# Patient Record
Sex: FEMALE | Race: OTHER | HISPANIC OR LATINO | URBAN - METROPOLITAN AREA
[De-identification: names, ages, dates, MRNs, and addresses within clinical notes are randomized per-mention and may not be internally consistent; named-entity substitution may affect disease eponyms.]

---

## 2019-10-21 ENCOUNTER — EMERGENCY (EMERGENCY)
Facility: HOSPITAL | Age: 44
LOS: 1 days | Discharge: HOME | End: 2019-10-21
Attending: EMERGENCY MEDICINE
Payer: MEDICAID

## 2019-10-21 VITALS
SYSTOLIC BLOOD PRESSURE: 136 MMHG | HEIGHT: 66 IN | WEIGHT: 231.49 LBS | DIASTOLIC BLOOD PRESSURE: 79 MMHG | RESPIRATION RATE: 16 BRPM | OXYGEN SATURATION: 99 % | HEART RATE: 78 BPM | TEMPERATURE: 97 F

## 2019-10-21 LAB
ALBUMIN SERPL ELPH-MCNC: 3.8 G/DL — SIGNIFICANT CHANGE UP (ref 3.5–5)
ALP SERPL-CCNC: 64 U/L — SIGNIFICANT CHANGE UP (ref 40–120)
ALT FLD-CCNC: 131 U/L DA — HIGH (ref 10–60)
ANION GAP SERPL CALC-SCNC: 5 MMOL/L — SIGNIFICANT CHANGE UP (ref 5–17)
ANISOCYTOSIS BLD QL: SLIGHT — SIGNIFICANT CHANGE UP
APPEARANCE UR: CLEAR — SIGNIFICANT CHANGE UP
AST SERPL-CCNC: 103 U/L — HIGH (ref 10–40)
BASOPHILS # BLD AUTO: 0.07 K/UL — SIGNIFICANT CHANGE UP (ref 0–0.2)
BASOPHILS NFR BLD AUTO: 1 % — SIGNIFICANT CHANGE UP (ref 0–2)
BILIRUB SERPL-MCNC: 0.3 MG/DL — SIGNIFICANT CHANGE UP (ref 0.2–1.2)
BILIRUB UR-MCNC: NEGATIVE — SIGNIFICANT CHANGE UP
BUN SERPL-MCNC: 7 MG/DL — SIGNIFICANT CHANGE UP (ref 7–18)
CALCIUM SERPL-MCNC: 9.5 MG/DL — SIGNIFICANT CHANGE UP (ref 8.4–10.5)
CHLORIDE SERPL-SCNC: 104 MMOL/L — SIGNIFICANT CHANGE UP (ref 96–108)
CO2 SERPL-SCNC: 29 MMOL/L — SIGNIFICANT CHANGE UP (ref 22–31)
COLOR SPEC: YELLOW — SIGNIFICANT CHANGE UP
CREAT SERPL-MCNC: 0.71 MG/DL — SIGNIFICANT CHANGE UP (ref 0.5–1.3)
DIFF PNL FLD: NEGATIVE — SIGNIFICANT CHANGE UP
EOSINOPHIL # BLD AUTO: 0.44 K/UL — SIGNIFICANT CHANGE UP (ref 0–0.5)
EOSINOPHIL NFR BLD AUTO: 6 % — SIGNIFICANT CHANGE UP (ref 0–6)
GLUCOSE SERPL-MCNC: 100 MG/DL — HIGH (ref 70–99)
GLUCOSE UR QL: NEGATIVE — SIGNIFICANT CHANGE UP
HCT VFR BLD CALC: 43.5 % — SIGNIFICANT CHANGE UP (ref 34.5–45)
HGB BLD-MCNC: 13.5 G/DL — SIGNIFICANT CHANGE UP (ref 11.5–15.5)
HYPOCHROMIA BLD QL: SLIGHT — SIGNIFICANT CHANGE UP
KETONES UR-MCNC: NEGATIVE — SIGNIFICANT CHANGE UP
LEUKOCYTE ESTERASE UR-ACNC: NEGATIVE — SIGNIFICANT CHANGE UP
LG PLATELETS BLD QL AUTO: SLIGHT — SIGNIFICANT CHANGE UP
LIDOCAIN IGE QN: 184 U/L — SIGNIFICANT CHANGE UP (ref 73–393)
LYMPHOCYTES # BLD AUTO: 2.35 K/UL — SIGNIFICANT CHANGE UP (ref 1–3.3)
LYMPHOCYTES # BLD AUTO: 32 % — SIGNIFICANT CHANGE UP (ref 13–44)
MANUAL SMEAR VERIFICATION: SIGNIFICANT CHANGE UP
MCHC RBC-ENTMCNC: 29.3 PG — SIGNIFICANT CHANGE UP (ref 27–34)
MCHC RBC-ENTMCNC: 31 GM/DL — LOW (ref 32–36)
MCV RBC AUTO: 94.4 FL — SIGNIFICANT CHANGE UP (ref 80–100)
MICROCYTES BLD QL: SLIGHT — SIGNIFICANT CHANGE UP
MONOCYTES # BLD AUTO: 0.44 K/UL — SIGNIFICANT CHANGE UP (ref 0–0.9)
MONOCYTES NFR BLD AUTO: 6 % — SIGNIFICANT CHANGE UP (ref 2–14)
NEUTROPHILS # BLD AUTO: 4.04 K/UL — SIGNIFICANT CHANGE UP (ref 1.8–7.4)
NEUTROPHILS NFR BLD AUTO: 55 % — SIGNIFICANT CHANGE UP (ref 43–77)
NITRITE UR-MCNC: NEGATIVE — SIGNIFICANT CHANGE UP
NRBC # BLD: 0 /100 — SIGNIFICANT CHANGE UP (ref 0–0)
OVALOCYTES BLD QL SMEAR: SLIGHT — SIGNIFICANT CHANGE UP
PH UR: 6.5 — SIGNIFICANT CHANGE UP (ref 5–8)
PLAT MORPH BLD: NORMAL — SIGNIFICANT CHANGE UP
PLATELET # BLD AUTO: 229 K/UL — SIGNIFICANT CHANGE UP (ref 150–400)
POIKILOCYTOSIS BLD QL AUTO: SLIGHT — SIGNIFICANT CHANGE UP
POLYCHROMASIA BLD QL SMEAR: SLIGHT — SIGNIFICANT CHANGE UP
POTASSIUM SERPL-MCNC: 3.6 MMOL/L — SIGNIFICANT CHANGE UP (ref 3.5–5.3)
POTASSIUM SERPL-SCNC: 3.6 MMOL/L — SIGNIFICANT CHANGE UP (ref 3.5–5.3)
PROT SERPL-MCNC: 8.1 G/DL — SIGNIFICANT CHANGE UP (ref 6–8.3)
PROT UR-MCNC: NEGATIVE — SIGNIFICANT CHANGE UP
RBC # BLD: 4.61 M/UL — SIGNIFICANT CHANGE UP (ref 3.8–5.2)
RBC # FLD: 16 % — HIGH (ref 10.3–14.5)
RBC BLD AUTO: ABNORMAL
SODIUM SERPL-SCNC: 138 MMOL/L — SIGNIFICANT CHANGE UP (ref 135–145)
SP GR SPEC: 1 — LOW (ref 1.01–1.02)
SPHEROCYTES BLD QL SMEAR: SLIGHT — SIGNIFICANT CHANGE UP
UROBILINOGEN FLD QL: NEGATIVE — SIGNIFICANT CHANGE UP
WBC # BLD: 7.35 K/UL — SIGNIFICANT CHANGE UP (ref 3.8–10.5)
WBC # FLD AUTO: 7.35 K/UL — SIGNIFICANT CHANGE UP (ref 3.8–10.5)

## 2019-10-21 PROCEDURE — 76705 ECHO EXAM OF ABDOMEN: CPT | Mod: 26

## 2019-10-21 PROCEDURE — 99284 EMERGENCY DEPT VISIT MOD MDM: CPT

## 2019-10-21 PROCEDURE — 81003 URINALYSIS AUTO W/O SCOPE: CPT

## 2019-10-21 PROCEDURE — 36415 COLL VENOUS BLD VENIPUNCTURE: CPT

## 2019-10-21 PROCEDURE — 85027 COMPLETE CBC AUTOMATED: CPT

## 2019-10-21 PROCEDURE — 76705 ECHO EXAM OF ABDOMEN: CPT

## 2019-10-21 PROCEDURE — 99284 EMERGENCY DEPT VISIT MOD MDM: CPT | Mod: 25

## 2019-10-21 PROCEDURE — 80053 COMPREHEN METABOLIC PANEL: CPT

## 2019-10-21 PROCEDURE — 73030 X-RAY EXAM OF SHOULDER: CPT | Mod: 26,LT

## 2019-10-21 PROCEDURE — 73030 X-RAY EXAM OF SHOULDER: CPT

## 2019-10-21 PROCEDURE — 83690 ASSAY OF LIPASE: CPT

## 2019-10-21 PROCEDURE — 81025 URINE PREGNANCY TEST: CPT

## 2019-10-21 PROCEDURE — 96374 THER/PROPH/DIAG INJ IV PUSH: CPT

## 2019-10-21 RX ORDER — KETOROLAC TROMETHAMINE 30 MG/ML
30 SYRINGE (ML) INJECTION ONCE
Refills: 0 | Status: DISCONTINUED | OUTPATIENT
Start: 2019-10-21 | End: 2019-10-21

## 2019-10-21 RX ORDER — HYDROXYZINE HCL 10 MG
1 TABLET ORAL
Qty: 10 | Refills: 0
Start: 2019-10-21 | End: 2019-10-30

## 2019-10-21 RX ORDER — FAMOTIDINE 10 MG/ML
1 INJECTION INTRAVENOUS
Qty: 14 | Refills: 0
Start: 2019-10-21 | End: 2019-10-27

## 2019-10-21 RX ORDER — SODIUM CHLORIDE 9 MG/ML
1000 INJECTION INTRAMUSCULAR; INTRAVENOUS; SUBCUTANEOUS ONCE
Refills: 0 | Status: COMPLETED | OUTPATIENT
Start: 2019-10-21 | End: 2019-10-21

## 2019-10-21 RX ADMIN — SODIUM CHLORIDE 1000 MILLILITER(S): 9 INJECTION INTRAMUSCULAR; INTRAVENOUS; SUBCUTANEOUS at 13:30

## 2019-10-21 RX ADMIN — Medication 30 MILLIGRAM(S): at 15:40

## 2019-10-21 RX ADMIN — SODIUM CHLORIDE 1000 MILLILITER(S): 9 INJECTION INTRAMUSCULAR; INTRAVENOUS; SUBCUTANEOUS at 12:16

## 2019-10-21 RX ADMIN — Medication 30 MILLIGRAM(S): at 16:30

## 2019-10-21 NOTE — ED PROVIDER NOTE - PATIENT PORTAL LINK FT
You can access the FollowMyHealth Patient Portal offered by [facility] by registering at the following website: [email or web address]. By joining [employer]’s FollowMyHealth portal, you will also be able to view your health information using other applications (apps) compatible with our system.

## 2019-10-21 NOTE — ED ADULT TRIAGE NOTE - CHIEF COMPLAINT QUOTE
c/o headaches on/off x 2 weeks, red eyes, burping, diarrhea on/off and a rash sometimes. Concerned may be allergy to something. c/o headaches on/off x 2 weeks, red eyes, burping, diarrhea on/off, orange urine and a rash sometimes. Concerned may be allergy to something.

## 2019-10-21 NOTE — ED ADULT NURSE NOTE - CHIEF COMPLAINT QUOTE
c/o headaches on/off x 2 weeks, red eyes, burping, diarrhea on/off, orange urine and a rash sometimes. Concerned may be allergy to something.

## 2019-10-21 NOTE — ED PROVIDER NOTE - OBJECTIVE STATEMENT
43 y/o F patient with no significant PMHx and no significant PSHx presents to the ED with itchiness at night with a rash. Patient also reports having abdominal pain and left shoulder pain. Patient says her rash resolved itself and does convey having a new mattress. Patient says her abdominal pain has been chronic and she has some belching and feels bloated. Patient denies radiating pain to the back. Patient denies vomiting and any other complaints. NKDA.

## 2019-10-21 NOTE — ED ADULT NURSE NOTE - OBJECTIVE STATEMENT
As per patient she has on and off headache x2 weeks , with on and off burping ,red eyes , rashes and left shoulder pain for months As per patient she has on and off headache 3 days , with on and off belching,scratching body, rashes all over body, stomach inflamed ,red eyes , rashesx1 month and left shoulder pain x2 days ,denies injury.

## 2019-10-21 NOTE — ED PROVIDER NOTE - CARE PLAN
Principal Discharge DX:	Left shoulder pain, unspecified chronicity  Secondary Diagnosis:	Other gastritis with bleeding, unspecified chronicity

## 2019-10-21 NOTE — ED PROVIDER NOTE - ATTENDING CONTRIBUTION TO CARE
I, Viktoriya Washington, performed the initial face to face bedside interview with this patient regarding history of present illness, review of symptoms and relevant past medical, social and family history.  I completed an independent physical examination.  I was the initial provider who evaluated this patient. I have signed out the follow up of any pending tests (i.e. labs, radiological studies) to the ACP.  I have communicated the patient’s plan of care and disposition with the ACP.  The history, relevant review of systems, past medical and surgical history, medical decision making, and physical examination was documented by the scribe in my presence and I attest to the accuracy of the documentation.

## 2019-11-17 ENCOUNTER — TRANSCRIPTION ENCOUNTER (OUTPATIENT)
Age: 44
End: 2019-11-17

## 2020-06-13 ENCOUNTER — EMERGENCY (EMERGENCY)
Facility: HOSPITAL | Age: 45
LOS: 1 days | Discharge: HOME | End: 2020-06-13
Attending: EMERGENCY MEDICINE | Admitting: EMERGENCY MEDICINE
Payer: MEDICAID

## 2020-06-13 VITALS
TEMPERATURE: 98 F | HEART RATE: 88 BPM | DIASTOLIC BLOOD PRESSURE: 88 MMHG | RESPIRATION RATE: 17 BRPM | SYSTOLIC BLOOD PRESSURE: 124 MMHG | OXYGEN SATURATION: 100 %

## 2020-06-13 VITALS
SYSTOLIC BLOOD PRESSURE: 138 MMHG | DIASTOLIC BLOOD PRESSURE: 85 MMHG | RESPIRATION RATE: 17 BRPM | HEART RATE: 77 BPM | OXYGEN SATURATION: 100 % | TEMPERATURE: 98 F

## 2020-06-13 LAB
ALBUMIN SERPL ELPH-MCNC: 4 G/DL — SIGNIFICANT CHANGE UP (ref 3.3–5)
ALP SERPL-CCNC: 72 U/L — SIGNIFICANT CHANGE UP (ref 40–120)
ALT FLD-CCNC: 126 U/L — HIGH (ref 4–33)
ANION GAP SERPL CALC-SCNC: 14 MMO/L — SIGNIFICANT CHANGE UP (ref 7–14)
APPEARANCE UR: CLEAR — SIGNIFICANT CHANGE UP
AST SERPL-CCNC: 106 U/L — HIGH (ref 4–32)
BASOPHILS # BLD AUTO: 0.02 K/UL — SIGNIFICANT CHANGE UP (ref 0–0.2)
BASOPHILS NFR BLD AUTO: 0.2 % — SIGNIFICANT CHANGE UP (ref 0–2)
BILIRUB SERPL-MCNC: 0.3 MG/DL — SIGNIFICANT CHANGE UP (ref 0.2–1.2)
BILIRUB UR-MCNC: NEGATIVE — SIGNIFICANT CHANGE UP
BLOOD UR QL VISUAL: NEGATIVE — SIGNIFICANT CHANGE UP
BUN SERPL-MCNC: 7 MG/DL — SIGNIFICANT CHANGE UP (ref 7–23)
CALCIUM SERPL-MCNC: 9.3 MG/DL — SIGNIFICANT CHANGE UP (ref 8.4–10.5)
CHLORIDE SERPL-SCNC: 102 MMOL/L — SIGNIFICANT CHANGE UP (ref 98–107)
CO2 SERPL-SCNC: 24 MMOL/L — SIGNIFICANT CHANGE UP (ref 22–31)
COLOR SPEC: SIGNIFICANT CHANGE UP
CREAT SERPL-MCNC: 0.67 MG/DL — SIGNIFICANT CHANGE UP (ref 0.5–1.3)
EOSINOPHIL # BLD AUTO: 0.32 K/UL — SIGNIFICANT CHANGE UP (ref 0–0.5)
EOSINOPHIL NFR BLD AUTO: 3.8 % — SIGNIFICANT CHANGE UP (ref 0–6)
GLUCOSE SERPL-MCNC: 116 MG/DL — HIGH (ref 70–99)
GLUCOSE UR-MCNC: NEGATIVE — SIGNIFICANT CHANGE UP
HCT VFR BLD CALC: 42 % — SIGNIFICANT CHANGE UP (ref 34.5–45)
HGB BLD-MCNC: 13.9 G/DL — SIGNIFICANT CHANGE UP (ref 11.5–15.5)
HIV COMBO RESULT: SIGNIFICANT CHANGE UP
HIV1+2 AB SPEC QL: SIGNIFICANT CHANGE UP
IMM GRANULOCYTES NFR BLD AUTO: 0.2 % — SIGNIFICANT CHANGE UP (ref 0–1.5)
KETONES UR-MCNC: NEGATIVE — SIGNIFICANT CHANGE UP
LEUKOCYTE ESTERASE UR-ACNC: NEGATIVE — SIGNIFICANT CHANGE UP
LIDOCAIN IGE QN: 20.6 U/L — SIGNIFICANT CHANGE UP (ref 7–60)
LYMPHOCYTES # BLD AUTO: 1.92 K/UL — SIGNIFICANT CHANGE UP (ref 1–3.3)
LYMPHOCYTES # BLD AUTO: 22.5 % — SIGNIFICANT CHANGE UP (ref 13–44)
MCHC RBC-ENTMCNC: 32.4 PG — SIGNIFICANT CHANGE UP (ref 27–34)
MCHC RBC-ENTMCNC: 33.1 % — SIGNIFICANT CHANGE UP (ref 32–36)
MCV RBC AUTO: 97.9 FL — SIGNIFICANT CHANGE UP (ref 80–100)
MONOCYTES # BLD AUTO: 0.6 K/UL — SIGNIFICANT CHANGE UP (ref 0–0.9)
MONOCYTES NFR BLD AUTO: 7 % — SIGNIFICANT CHANGE UP (ref 2–14)
NEUTROPHILS # BLD AUTO: 5.64 K/UL — SIGNIFICANT CHANGE UP (ref 1.8–7.4)
NEUTROPHILS NFR BLD AUTO: 66.3 % — SIGNIFICANT CHANGE UP (ref 43–77)
NITRITE UR-MCNC: NEGATIVE — SIGNIFICANT CHANGE UP
NRBC # FLD: 0 K/UL — SIGNIFICANT CHANGE UP (ref 0–0)
PH UR: 6.5 — SIGNIFICANT CHANGE UP (ref 5–8)
PLATELET # BLD AUTO: 227 K/UL — SIGNIFICANT CHANGE UP (ref 150–400)
PMV BLD: 13 FL — SIGNIFICANT CHANGE UP (ref 7–13)
POTASSIUM SERPL-MCNC: 4.1 MMOL/L — SIGNIFICANT CHANGE UP (ref 3.5–5.3)
POTASSIUM SERPL-SCNC: 4.1 MMOL/L — SIGNIFICANT CHANGE UP (ref 3.5–5.3)
PROT SERPL-MCNC: 7.4 G/DL — SIGNIFICANT CHANGE UP (ref 6–8.3)
PROT UR-MCNC: NEGATIVE — SIGNIFICANT CHANGE UP
RBC # BLD: 4.29 M/UL — SIGNIFICANT CHANGE UP (ref 3.8–5.2)
RBC # FLD: 12.7 % — SIGNIFICANT CHANGE UP (ref 10.3–14.5)
SODIUM SERPL-SCNC: 140 MMOL/L — SIGNIFICANT CHANGE UP (ref 135–145)
SP GR SPEC: 1.01 — SIGNIFICANT CHANGE UP (ref 1–1.04)
UROBILINOGEN FLD QL: NORMAL — SIGNIFICANT CHANGE UP
WBC # BLD: 8.52 K/UL — SIGNIFICANT CHANGE UP (ref 3.8–10.5)
WBC # FLD AUTO: 8.52 K/UL — SIGNIFICANT CHANGE UP (ref 3.8–10.5)

## 2020-06-13 PROCEDURE — 99284 EMERGENCY DEPT VISIT MOD MDM: CPT

## 2020-06-13 PROCEDURE — 76705 ECHO EXAM OF ABDOMEN: CPT | Mod: 26

## 2020-06-13 RX ORDER — ONDANSETRON 8 MG/1
4 TABLET, FILM COATED ORAL ONCE
Refills: 0 | Status: COMPLETED | OUTPATIENT
Start: 2020-06-13 | End: 2020-06-13

## 2020-06-13 RX ORDER — FAMOTIDINE 10 MG/ML
20 INJECTION INTRAVENOUS ONCE
Refills: 0 | Status: COMPLETED | OUTPATIENT
Start: 2020-06-13 | End: 2020-06-13

## 2020-06-13 RX ORDER — SUCRALFATE 1 G
10 TABLET ORAL
Qty: 150 | Refills: 0
Start: 2020-06-13 | End: 2020-06-17

## 2020-06-13 RX ORDER — SUCRALFATE 1 G
1 TABLET ORAL ONCE
Refills: 0 | Status: COMPLETED | OUTPATIENT
Start: 2020-06-13 | End: 2020-06-13

## 2020-06-13 RX ORDER — ACETAMINOPHEN 500 MG
650 TABLET ORAL ONCE
Refills: 0 | Status: COMPLETED | OUTPATIENT
Start: 2020-06-13 | End: 2020-06-13

## 2020-06-13 RX ADMIN — Medication 1 GRAM(S): at 16:09

## 2020-06-13 RX ADMIN — Medication 30 MILLILITER(S): at 12:13

## 2020-06-13 RX ADMIN — FAMOTIDINE 20 MILLIGRAM(S): 10 INJECTION INTRAVENOUS at 12:13

## 2020-06-13 RX ADMIN — ONDANSETRON 4 MILLIGRAM(S): 8 TABLET, FILM COATED ORAL at 16:09

## 2020-06-13 RX ADMIN — Medication 650 MILLIGRAM(S): at 14:51

## 2020-06-13 NOTE — ED PROVIDER NOTE - CLINICAL SUMMARY MEDICAL DECISION MAKING FREE TEXT BOX
Naranjo PGY3: 45F w/ chronic abd pain characterized as constant, burning, with ttp epigastrium and RUQ, will trial GI cocktail, suspect gastritis, will check labs, rule out pregnancy, eval for biliary etiology, nondistended, does not appear obstructed, will reassess after labs/ US.

## 2020-06-13 NOTE — ED PROVIDER NOTE - GASTROINTESTINAL, MLM
Abdomen soft, mod TTP epigastrium and RUQ, no rebound, no guarding, nondistended. Abdomen soft, mod TTP epigastrium and RUQ, no rebound, no guarding, nondistended. No Saleem's

## 2020-06-13 NOTE — ED PROVIDER NOTE - NSFOLLOWUPINSTRUCTIONS_ED_ALL_ED_FT
Usted fue visto hoy por dolor abdominal que mejoró significativamente con maalox y pepcid. Debe continuar tomando 20 mg de pepcid cada 12 horas para disminuir el ácido estomacal, ya recibió irineo dosis esta mañana y debe foreign otra hoy. Puede foreign maalox según sea necesario de acuerdo con la etiqueta del medicamento, generalmente cada 8 horas. Estos medicamentos se pueden foreign sin receta médica. Por favor mantente hidratado.    Regrese a la shane de emergencias si desarrolla fiebre, escalofríos o dolor abdominal intenso y vómitos.    Debe hacer un seguimiento con un gastroenterólogo por sospecha de gastritis.    Tuvo irineo elevación del laboratorio hepático, elvin fue muy leve y reyes ecografía de la vesícula biliar y el hígado fue normal.    You were seen today for abdominal pain that improved significantly with maalox and pepcid. You should continue to take pepcid 20mg every 12 hours to decrease stomach acid, you already had a dose this morning and you should take another one today. You can take maalox as needed according to the medication label, this is usually every 8 hours. These medications can be taken over the counter. Please stay hydrated.     Please return to the emergency room if you develop fevers, chills or severe abdominal pain and vomiting.     You should follow up with a gastroenterologist for suspected gastritis.     You had some liver lab elevation but it was very mild and your sonogram of your gallbladder and liver was normal.    Acute Abdominal Pain    WHAT YOU NEED TO KNOW:    The cause of your abdominal pain may not be found. If a cause is found, treatment will depend on what the cause is.     DISCHARGE INSTRUCTIONS:    Return to the emergency department if:     You vomit blood or cannot stop vomiting.      You have blood in your bowel movement or it looks like tar.       You have bleeding from your rectum.       Your abdomen is larger than usual, more painful, and hard.       You have severe pain in your abdomen.       You stop passing gas and having bowel movements.       You feel weak, dizzy, or faint.    Contact your healthcare provider if:     You have a fever.      You have new signs and symptoms.      Your symptoms do not get better with treatment.       You have questions or concerns about your condition or care.    Medicines may be given to decrease pain, treat an infection, and manage your symptoms. Take your medicine as directed. Call your healthcare provider if you think your medicine is not helping or if you have side effects. Tell him if you are allergic to any medicine. Keep a list of the medicines, vitamins, and herbs you take. Include the amounts, and when and why you take them. Bring the list or the pill bottles to follow-up visits. Carry your medicine list with you in case of an emergency.    Manage your symptoms:     Apply heat on your abdomen for 20 to 30 minutes every 2 hours for as many days as directed. Heat helps decrease pain and muscle spasms.       Manage your stress. Stress may cause abdominal pain. Your healthcare provider may recommend relaxation techniques and deep breathing exercises to help decrease your stress. Your healthcare provider may recommend you talk to someone about your stress or anxiety, such as a counselor or a trusted friend. Get plenty of sleep and exercise regularly.       Limit or do not drink alcohol. Alcohol can make your abdominal pain worse. Ask your healthcare provider if it is safe for you to drink alcohol. Also ask how much is safe for you to drink.       Do not smoke. Nicotine and other chemicals in cigarettes can damage your esophagus and stomach. Ask your healthcare provider for information if you currently smoke and need help to quit. E-cigarettes or smokeless tobacco still contain nicotine. Talk to your healthcare provider before you use these products.     Make changes to the food you eat as directed: Do not eat foods that cause abdominal pain or other symptoms. Eat small meals more often.     Eat more high-fiber foods if you are constipated. High-fiber foods include fruits, vegetables, whole-grain foods, and legumes.       Do not eat foods that cause gas if you have bloating. Examples include broccoli, cabbage, and cauliflower. Do not drink soda or carbonated drinks, because these may also cause gas.       Do not eat foods or drinks that contain sorbitol or fructose if you have diarrhea and bloating. Some examples are fruit juices, candy, jelly, and sugar-free gum.       Do not eat high-fat foods, such as fried foods, cheeseburgers, hot dogs, and desserts.      Limit or do not drink caffeine. Caffeine may make symptoms, such as heart burn or nausea, worse.       Drink plenty of liquids to prevent dehydration from diarrhea or vomiting. Ask your healthcare provider how much liquid to drink each day and which liquids are best for you.     Follow up with your healthcare provider as directed: Write down your questions so you remember to ask them during your visits. Usted fue visto hoy por dolor abdominal que mejoró significativamente con maalox y pepcid. Debe continuar tomando 20 mg de pepcid cada 12 horas para disminuir el ácido estomacal, ya recibió irineo dosis esta mañana y debe foreign otra hoy. Puede foreign maalox según sea necesario de acuerdo con la etiqueta del medicamento, generalmente cada 8 horas. Estos medicamentos se pueden foreign sin receta médica. Por favor mantente hidratado.      Enviamos carafate a reyes farmacia. Puede foreign 1 g cada 8 horas, 30 minutos a 1 hora antes de las comidas para ayudar con el dolor de gastritis    Regrese a la shane de emergencias si desarrolla fiebre, escalofríos o dolor abdominal intenso y vómitos.    Debe hacer un seguimiento con un gastroenterólogo por sospecha de gastritis.    Tuvo irineo elevación del laboratorio hepático, elvin fue muy leve y reyes ecografía de la vesícula biliar y el hígado fue normal.    You were seen today for abdominal pain that improved significantly with maalox and pepcid. You should continue to take pepcid 20mg every 12 hours to decrease stomach acid, you already had a dose this morning and you should take another one today. You can take maalox as needed according to the medication label, this is usually every 8 hours. These medications can be taken over the counter. Please stay hydrated.     Please return to the emergency room if you develop fevers, chills or severe abdominal pain and vomiting.     You should follow up with a gastroenterologist for suspected gastritis.     You had some liver lab elevation but it was very mild and your sonogram of your gallbladder and liver was normal.    Acute Abdominal Pain    WHAT YOU NEED TO KNOW:    The cause of your abdominal pain may not be found. If a cause is found, treatment will depend on what the cause is.     DISCHARGE INSTRUCTIONS:    Return to the emergency department if:     You vomit blood or cannot stop vomiting.      You have blood in your bowel movement or it looks like tar.       You have bleeding from your rectum.       Your abdomen is larger than usual, more painful, and hard.       You have severe pain in your abdomen.       You stop passing gas and having bowel movements.       You feel weak, dizzy, or faint.    Contact your healthcare provider if:     You have a fever.      You have new signs and symptoms.      Your symptoms do not get better with treatment.       You have questions or concerns about your condition or care.    Medicines may be given to decrease pain, treat an infection, and manage your symptoms. Take your medicine as directed. Call your healthcare provider if you think your medicine is not helping or if you have side effects. Tell him if you are allergic to any medicine. Keep a list of the medicines, vitamins, and herbs you take. Include the amounts, and when and why you take them. Bring the list or the pill bottles to follow-up visits. Carry your medicine list with you in case of an emergency.    Manage your symptoms:     Apply heat on your abdomen for 20 to 30 minutes every 2 hours for as many days as directed. Heat helps decrease pain and muscle spasms.       Manage your stress. Stress may cause abdominal pain. Your healthcare provider may recommend relaxation techniques and deep breathing exercises to help decrease your stress. Your healthcare provider may recommend you talk to someone about your stress or anxiety, such as a counselor or a trusted friend. Get plenty of sleep and exercise regularly.       Limit or do not drink alcohol. Alcohol can make your abdominal pain worse. Ask your healthcare provider if it is safe for you to drink alcohol. Also ask how much is safe for you to drink.       Do not smoke. Nicotine and other chemicals in cigarettes can damage your esophagus and stomach. Ask your healthcare provider for information if you currently smoke and need help to quit. E-cigarettes or smokeless tobacco still contain nicotine. Talk to your healthcare provider before you use these products.     Make changes to the food you eat as directed: Do not eat foods that cause abdominal pain or other symptoms. Eat small meals more often.     Eat more high-fiber foods if you are constipated. High-fiber foods include fruits, vegetables, whole-grain foods, and legumes.       Do not eat foods that cause gas if you have bloating. Examples include broccoli, cabbage, and cauliflower. Do not drink soda or carbonated drinks, because these may also cause gas.       Do not eat foods or drinks that contain sorbitol or fructose if you have diarrhea and bloating. Some examples are fruit juices, candy, jelly, and sugar-free gum.       Do not eat high-fat foods, such as fried foods, cheeseburgers, hot dogs, and desserts.      Limit or do not drink caffeine. Caffeine may make symptoms, such as heart burn or nausea, worse.       Drink plenty of liquids to prevent dehydration from diarrhea or vomiting. Ask your healthcare provider how much liquid to drink each day and which liquids are best for you.     Follow up with your healthcare provider as directed: Write down your questions so you remember to ask them during your visits.

## 2020-06-13 NOTE — ED PROVIDER NOTE - PATIENT PORTAL LINK FT
You can access the FollowMyHealth Patient Portal offered by [facility] by registering at the following website: [email or web address]. By joining [employer]’s FollowMyHealth portal, you will also be able to view your health information using other applications (apps) compatible with our system. You can access the FollowMyHealth Patient Portal offered by [facility] by registering at the following website: [email or web address]. By joining [employer]’s FollowMyHealth portal, you will also be able to view your health information using other applications (apps) compatible with our system.

## 2020-06-13 NOTE — ED PROVIDER NOTE - PROGRESS NOTE DETAILS
Naranjo PGY3: pt feels improved, states discomfort is 4/10 but feels comfortable, given food to po challenge, results and follow up information explained, as well as return precautions, all questions answered. - Guamanian translation provided by Samia Stover RN Naranjo PGY3: pt had recurrent discomfort after eating, then given carafate, notes significant improvement, given GI referral, and sent carafate to pharmacy, pt stable for dc

## 2020-06-13 NOTE — ED ADULT NURSE NOTE - OBJECTIVE STATEMENT
Pt a&ox3, calm/cooperative, c/o of upper abdominal pain with n/v/d  since March. Pt denies chest pain, sob. respirations even/unlabored, nad noted. 20g iv to right ac, labs drawn and sent. will continue to monitor

## 2020-06-16 ENCOUNTER — APPOINTMENT (OUTPATIENT)
Dept: GASTROENTEROLOGY | Facility: CLINIC | Age: 45
End: 2020-06-16
Payer: MEDICAID

## 2020-06-16 VITALS
BODY MASS INDEX: 40.34 KG/M2 | SYSTOLIC BLOOD PRESSURE: 109 MMHG | WEIGHT: 251 LBS | DIASTOLIC BLOOD PRESSURE: 75 MMHG | HEIGHT: 66 IN | OXYGEN SATURATION: 97 % | TEMPERATURE: 98.1 F | HEART RATE: 73 BPM

## 2020-06-16 DIAGNOSIS — Z83.79 FAMILY HISTORY OF OTHER DISEASES OF THE DIGESTIVE SYSTEM: ICD-10-CM

## 2020-06-16 DIAGNOSIS — Z83.3 FAMILY HISTORY OF DIABETES MELLITUS: ICD-10-CM

## 2020-06-16 DIAGNOSIS — Z78.9 OTHER SPECIFIED HEALTH STATUS: ICD-10-CM

## 2020-06-16 PROCEDURE — 99204 OFFICE O/P NEW MOD 45 MIN: CPT

## 2020-06-16 RX ORDER — CEPHALEXIN 500 MG
1 CAPSULE ORAL
Qty: 10 | Refills: 0
Start: 2020-06-16 | End: 2020-06-20

## 2020-06-16 NOTE — ASSESSMENT
[FreeTextEntry1] : 1. GERD\par \par Avoid spicy oily greasy foods\par \par Low acid diet \par \par PPI\par \par Wt control \par \par Exercise plan\par \par \par 2. BLOAT\par \par LOW FODMAP\par \par Fiber supp daily \par \par Increase fluids\par \par \par 3. GAS\par \par Avoid leafy vegetables\par \par GASEX PRN\par \par \par \par

## 2020-06-16 NOTE — PHYSICAL EXAM
[General Appearance - Alert] : alert [General Appearance - In No Acute Distress] : in no acute distress [Sclera] : the sclera and conjunctiva were normal [PERRL With Normal Accommodation] : pupils were equal in size, round, and reactive to light [Extraocular Movements] : extraocular movements were intact [Outer Ear] : the ears and nose were normal in appearance [Oropharynx] : the oropharynx was normal [Neck Appearance] : the appearance of the neck was normal [Neck Cervical Mass (___cm)] : no neck mass was observed [Jugular Venous Distention Increased] : there was no jugular-venous distention [Thyroid Diffuse Enlargement] : the thyroid was not enlarged [Thyroid Nodule] : there were no palpable thyroid nodules [Auscultation Breath Sounds / Voice Sounds] : lungs were clear to auscultation bilaterally [Heart Rate And Rhythm] : heart rate was normal and rhythm regular [Heart Sounds] : normal S1 and S2 [Heart Sounds Gallop] : no gallops [Murmurs] : no murmurs [Heart Sounds Pericardial Friction Rub] : no pericardial rub [Rounded] : rounded [Epigastric] : in the epigastric area [RLQ] : not in the RLQ [RUQ] : not in the RUQ [LLQ] : not in the LLQ [LUQ] : not in the LUQ [Cervical Lymph Nodes Enlarged Posterior Bilaterally] : posterior cervical [Cervical Lymph Nodes Enlarged Anterior Bilaterally] : anterior cervical [Supraclavicular Lymph Nodes Enlarged Bilaterally] : supraclavicular [Axillary Lymph Nodes Enlarged Bilaterally] : axillary [Inguinal Lymph Nodes Enlarged Bilaterally] : inguinal [Femoral Lymph Nodes Enlarged Bilaterally] : femoral [No CVA Tenderness] : no ~M costovertebral angle tenderness [No Spinal Tenderness] : no spinal tenderness [Nail Clubbing] : no clubbing  or cyanosis of the fingernails [Abnormal Walk] : normal gait [Motor Tone] : muscle strength and tone were normal [Musculoskeletal - Swelling] : no joint swelling seen [Skin Color & Pigmentation] : normal skin color and pigmentation [Skin Turgor] : normal skin turgor [] : no rash

## 2020-06-16 NOTE — ED POST DISCHARGE NOTE - RESULT SUMMARY
UXC: E. Coli  50,000-99,000 CFU/ml No antibiotic listed in ED provider note or prescription writer at time of discharge. Patient contacted s/w patient and . Patient having odor to urine. Discussed with patient will ERX Keflex 500mg BID X % days. Patient denies any pregnancy NKDA. Discussed with patient and  patient needs to follow up with MD for repeat UA/UCX. Return precautions given to patient.

## 2020-06-16 NOTE — HISTORY OF PRESENT ILLNESS
[de-identified] : Pt with dyspesia \par \par Was in ER 6/13\par \par Labs / Sono OK \par \par C/W NAFLD\par \par Sono OK \par \par + Bloat \par \par + ETOH\par \par Recent Smoker

## 2020-07-12 ENCOUNTER — OUTPATIENT (OUTPATIENT)
Dept: OUTPATIENT SERVICES | Facility: HOSPITAL | Age: 45
LOS: 1 days | Discharge: HOME | End: 2020-07-12

## 2020-07-12 DIAGNOSIS — Z01.818 ENCOUNTER FOR OTHER PREPROCEDURAL EXAMINATION: ICD-10-CM

## 2020-07-12 LAB — SARS-COV-2 RNA SPEC QL NAA+PROBE: SIGNIFICANT CHANGE UP

## 2020-07-14 ENCOUNTER — RESULT REVIEW (OUTPATIENT)
Age: 45
End: 2020-07-14

## 2020-07-14 ENCOUNTER — APPOINTMENT (OUTPATIENT)
Dept: GASTROENTEROLOGY | Facility: HOSPITAL | Age: 45
End: 2020-07-14
Payer: MEDICAID

## 2020-07-14 ENCOUNTER — OUTPATIENT (OUTPATIENT)
Dept: OUTPATIENT SERVICES | Facility: HOSPITAL | Age: 45
LOS: 1 days | Discharge: HOME | End: 2020-07-14
Payer: MEDICAID

## 2020-07-14 VITALS
SYSTOLIC BLOOD PRESSURE: 123 MMHG | HEART RATE: 79 BPM | DIASTOLIC BLOOD PRESSURE: 73 MMHG | RESPIRATION RATE: 16 BRPM | OXYGEN SATURATION: 98 %

## 2020-07-14 VITALS
WEIGHT: 253.09 LBS | TEMPERATURE: 99 F | DIASTOLIC BLOOD PRESSURE: 76 MMHG | HEART RATE: 94 BPM | HEIGHT: 66 IN | SYSTOLIC BLOOD PRESSURE: 151 MMHG | RESPIRATION RATE: 18 BRPM | OXYGEN SATURATION: 99 %

## 2020-07-14 PROCEDURE — 88312 SPECIAL STAINS GROUP 1: CPT | Mod: 26

## 2020-07-14 PROCEDURE — 88305 TISSUE EXAM BY PATHOLOGIST: CPT | Mod: 26

## 2020-07-14 PROCEDURE — 43239 EGD BIOPSY SINGLE/MULTIPLE: CPT

## 2020-07-14 RX ORDER — ONDANSETRON 8 MG/1
4 TABLET, FILM COATED ORAL ONCE
Refills: 0 | Status: DISCONTINUED | OUTPATIENT
Start: 2020-07-14 | End: 2020-07-14

## 2020-07-14 RX ORDER — SODIUM CHLORIDE 9 MG/ML
1000 INJECTION, SOLUTION INTRAVENOUS
Refills: 0 | Status: DISCONTINUED | OUTPATIENT
Start: 2020-07-14 | End: 2020-07-14

## 2020-07-14 RX ADMIN — SODIUM CHLORIDE 100 MILLILITER(S): 9 INJECTION, SOLUTION INTRAVENOUS at 11:10

## 2020-07-14 NOTE — ASU DISCHARGE PLAN (ADULT/PEDIATRIC) - CALL YOUR DOCTOR IF YOU HAVE ANY OF THE FOLLOWING:
Nausea and vomiting that does not stop/Increased irritability or sluggishness/Bleeding that does not stop/Wound/Surgical Site with redness, or foul smelling discharge or pus

## 2020-07-15 LAB — SURGICAL PATHOLOGY STUDY: SIGNIFICANT CHANGE UP

## 2020-07-20 DIAGNOSIS — K29.50 UNSPECIFIED CHRONIC GASTRITIS WITHOUT BLEEDING: ICD-10-CM

## 2020-07-20 DIAGNOSIS — B96.81 HELICOBACTER PYLORI [H. PYLORI] AS THE CAUSE OF DISEASES CLASSIFIED ELSEWHERE: ICD-10-CM

## 2020-07-20 DIAGNOSIS — K21.9 GASTRO-ESOPHAGEAL REFLUX DISEASE WITHOUT ESOPHAGITIS: ICD-10-CM

## 2020-07-20 DIAGNOSIS — Z87.891 PERSONAL HISTORY OF NICOTINE DEPENDENCE: ICD-10-CM

## 2020-07-20 DIAGNOSIS — E66.01 MORBID (SEVERE) OBESITY DUE TO EXCESS CALORIES: ICD-10-CM

## 2020-08-18 ENCOUNTER — APPOINTMENT (OUTPATIENT)
Dept: GASTROENTEROLOGY | Facility: CLINIC | Age: 45
End: 2020-08-18
Payer: MEDICAID

## 2020-08-18 VITALS
HEIGHT: 66 IN | SYSTOLIC BLOOD PRESSURE: 118 MMHG | HEART RATE: 110 BPM | DIASTOLIC BLOOD PRESSURE: 81 MMHG | OXYGEN SATURATION: 96 % | WEIGHT: 250 LBS | BODY MASS INDEX: 40.18 KG/M2

## 2020-08-18 PROCEDURE — 99214 OFFICE O/P EST MOD 30 MIN: CPT

## 2020-08-18 NOTE — PHYSICAL EXAM
[General Appearance - Alert] : alert [General Appearance - In No Acute Distress] : in no acute distress [Sclera] : the sclera and conjunctiva were normal [PERRL With Normal Accommodation] : pupils were equal in size, round, and reactive to light [Extraocular Movements] : extraocular movements were intact [Outer Ear] : the ears and nose were normal in appearance [Oropharynx] : the oropharynx was normal [Neck Appearance] : the appearance of the neck was normal [Neck Cervical Mass (___cm)] : no neck mass was observed [Jugular Venous Distention Increased] : there was no jugular-venous distention [Thyroid Diffuse Enlargement] : the thyroid was not enlarged [Thyroid Nodule] : there were no palpable thyroid nodules [Auscultation Breath Sounds / Voice Sounds] : lungs were clear to auscultation bilaterally [Heart Rate And Rhythm] : heart rate was normal and rhythm regular [Heart Sounds] : normal S1 and S2 [Heart Sounds Gallop] : no gallops [Murmurs] : no murmurs [Heart Sounds Pericardial Friction Rub] : no pericardial rub [Normal] : normal [Soft, Nontender] : the abdomen was soft and nontender [Epigastric] : in the epigastric area [No Mass] : no masses were palpated [No HSM] : no hepatosplenomegaly noted [Cervical Lymph Nodes Enlarged Posterior Bilaterally] : posterior cervical [Cervical Lymph Nodes Enlarged Anterior Bilaterally] : anterior cervical [Supraclavicular Lymph Nodes Enlarged Bilaterally] : supraclavicular [Axillary Lymph Nodes Enlarged Bilaterally] : axillary [Femoral Lymph Nodes Enlarged Bilaterally] : femoral [Inguinal Lymph Nodes Enlarged Bilaterally] : inguinal [No Spinal Tenderness] : no spinal tenderness [No CVA Tenderness] : no ~M costovertebral angle tenderness [Abnormal Walk] : normal gait [Nail Clubbing] : no clubbing  or cyanosis of the fingernails [Musculoskeletal - Swelling] : no joint swelling seen [Motor Tone] : muscle strength and tone were normal [Skin Color & Pigmentation] : normal skin color and pigmentation [Skin Turgor] : normal skin turgor [] : no rash

## 2020-09-09 ENCOUNTER — APPOINTMENT (OUTPATIENT)
Dept: GASTROENTEROLOGY | Facility: CLINIC | Age: 45
End: 2020-09-09
Payer: MEDICAID

## 2020-09-09 VITALS
BODY MASS INDEX: 40.18 KG/M2 | HEIGHT: 66 IN | DIASTOLIC BLOOD PRESSURE: 82 MMHG | WEIGHT: 250 LBS | HEART RATE: 90 BPM | TEMPERATURE: 93.3 F | SYSTOLIC BLOOD PRESSURE: 132 MMHG | RESPIRATION RATE: 17 BRPM | OXYGEN SATURATION: 98 %

## 2020-09-09 DIAGNOSIS — R14.3 FLATULENCE: ICD-10-CM

## 2020-09-09 DIAGNOSIS — R14.1 GAS PAIN: ICD-10-CM

## 2020-09-09 PROCEDURE — 99214 OFFICE O/P EST MOD 30 MIN: CPT

## 2020-09-09 NOTE — PHYSICAL EXAM
[General Appearance - In No Acute Distress] : in no acute distress [General Appearance - Alert] : alert [Sclera] : the sclera and conjunctiva were normal [PERRL With Normal Accommodation] : pupils were equal in size, round, and reactive to light [Extraocular Movements] : extraocular movements were intact [Oropharynx] : the oropharynx was normal [Outer Ear] : the ears and nose were normal in appearance [Neck Appearance] : the appearance of the neck was normal [Neck Cervical Mass (___cm)] : no neck mass was observed [Jugular Venous Distention Increased] : there was no jugular-venous distention [Thyroid Diffuse Enlargement] : the thyroid was not enlarged [Thyroid Nodule] : there were no palpable thyroid nodules [Auscultation Breath Sounds / Voice Sounds] : lungs were clear to auscultation bilaterally [Heart Rate And Rhythm] : heart rate was normal and rhythm regular [Heart Sounds] : normal S1 and S2 [Heart Sounds Gallop] : no gallops [Murmurs] : no murmurs [Heart Sounds Pericardial Friction Rub] : no pericardial rub [Bowel Sounds] : normal bowel sounds [Abdomen Soft] : soft [Abdomen Mass (___ Cm)] : no abdominal mass palpated [Abdomen Tenderness] : non-tender [Supraclavicular Lymph Nodes Enlarged Bilaterally] : supraclavicular [Cervical Lymph Nodes Enlarged Posterior Bilaterally] : posterior cervical [Cervical Lymph Nodes Enlarged Anterior Bilaterally] : anterior cervical [Femoral Lymph Nodes Enlarged Bilaterally] : femoral [Axillary Lymph Nodes Enlarged Bilaterally] : axillary [Inguinal Lymph Nodes Enlarged Bilaterally] : inguinal [No CVA Tenderness] : no ~M costovertebral angle tenderness [No Spinal Tenderness] : no spinal tenderness [Nail Clubbing] : no clubbing  or cyanosis of the fingernails [Abnormal Walk] : normal gait [Musculoskeletal - Swelling] : no joint swelling seen [Motor Tone] : muscle strength and tone were normal [Skin Color & Pigmentation] : normal skin color and pigmentation [Skin Turgor] : normal skin turgor [] : no rash [Normal] : normal [Soft, Nontender] : the abdomen was soft and nontender [Epigastric] : in the epigastric area [No Mass] : no masses were palpated [No HSM] : no hepatosplenomegaly noted

## 2020-10-01 ENCOUNTER — INPATIENT (INPATIENT)
Facility: HOSPITAL | Age: 45
LOS: 2 days | Discharge: HOME | DRG: 880 | End: 2020-10-04
Attending: INTERNAL MEDICINE | Admitting: INTERNAL MEDICINE
Payer: MEDICAID

## 2020-10-01 VITALS
HEIGHT: 66 IN | OXYGEN SATURATION: 97 % | HEART RATE: 130 BPM | RESPIRATION RATE: 18 BRPM | TEMPERATURE: 100 F | SYSTOLIC BLOOD PRESSURE: 145 MMHG | DIASTOLIC BLOOD PRESSURE: 82 MMHG | WEIGHT: 255.07 LBS

## 2020-10-01 DIAGNOSIS — Z90.49 ACQUIRED ABSENCE OF OTHER SPECIFIED PARTS OF DIGESTIVE TRACT: Chronic | ICD-10-CM

## 2020-10-01 DIAGNOSIS — Z29.9 ENCOUNTER FOR PROPHYLACTIC MEASURES, UNSPECIFIED: ICD-10-CM

## 2020-10-01 DIAGNOSIS — R21 RASH AND OTHER NONSPECIFIC SKIN ERUPTION: ICD-10-CM

## 2020-10-01 DIAGNOSIS — R74.0 NONSPECIFIC ELEVATION OF LEVELS OF TRANSAMINASE AND LACTIC ACID DEHYDROGENASE [LDH]: ICD-10-CM

## 2020-10-01 DIAGNOSIS — R00.0 TACHYCARDIA, UNSPECIFIED: ICD-10-CM

## 2020-10-01 DIAGNOSIS — R91.1 SOLITARY PULMONARY NODULE: ICD-10-CM

## 2020-10-01 DIAGNOSIS — G93.0 CEREBRAL CYSTS: ICD-10-CM

## 2020-10-01 DIAGNOSIS — K29.70 GASTRITIS, UNSPECIFIED, WITHOUT BLEEDING: ICD-10-CM

## 2020-10-01 DIAGNOSIS — F10.10 ALCOHOL ABUSE, UNCOMPLICATED: ICD-10-CM

## 2020-10-01 DIAGNOSIS — F41.9 ANXIETY DISORDER, UNSPECIFIED: ICD-10-CM

## 2020-10-01 LAB
ALBUMIN SERPL ELPH-MCNC: 3.7 G/DL — SIGNIFICANT CHANGE UP (ref 3.5–5)
ALP SERPL-CCNC: 82 U/L — SIGNIFICANT CHANGE UP (ref 40–120)
ALT FLD-CCNC: 87 U/L DA — HIGH (ref 10–60)
ANION GAP SERPL CALC-SCNC: 8 MMOL/L — SIGNIFICANT CHANGE UP (ref 5–17)
APTT BLD: 34.7 SEC — SIGNIFICANT CHANGE UP (ref 27.5–35.5)
AST SERPL-CCNC: 71 U/L — HIGH (ref 10–40)
BASOPHILS # BLD AUTO: 0.05 K/UL — SIGNIFICANT CHANGE UP (ref 0–0.2)
BASOPHILS NFR BLD AUTO: 0.5 % — SIGNIFICANT CHANGE UP (ref 0–2)
BILIRUB SERPL-MCNC: 0.2 MG/DL — SIGNIFICANT CHANGE UP (ref 0.2–1.2)
BUN SERPL-MCNC: 19 MG/DL — HIGH (ref 7–18)
CALCIUM SERPL-MCNC: 9.2 MG/DL — SIGNIFICANT CHANGE UP (ref 8.4–10.5)
CHLORIDE SERPL-SCNC: 105 MMOL/L — SIGNIFICANT CHANGE UP (ref 96–108)
CO2 SERPL-SCNC: 26 MMOL/L — SIGNIFICANT CHANGE UP (ref 22–31)
CREAT SERPL-MCNC: 0.74 MG/DL — SIGNIFICANT CHANGE UP (ref 0.5–1.3)
EOSINOPHIL # BLD AUTO: 0.06 K/UL — SIGNIFICANT CHANGE UP (ref 0–0.5)
EOSINOPHIL NFR BLD AUTO: 0.6 % — SIGNIFICANT CHANGE UP (ref 0–6)
ERYTHROCYTE [SEDIMENTATION RATE] IN BLOOD: 19 MM/HR — HIGH (ref 0–15)
GLUCOSE SERPL-MCNC: 119 MG/DL — HIGH (ref 70–99)
HCG SERPL-ACNC: 1 MIU/ML — SIGNIFICANT CHANGE UP
HCT VFR BLD CALC: 41.7 % — SIGNIFICANT CHANGE UP (ref 34.5–45)
HGB BLD-MCNC: 13.7 G/DL — SIGNIFICANT CHANGE UP (ref 11.5–15.5)
IMM GRANULOCYTES NFR BLD AUTO: 0.3 % — SIGNIFICANT CHANGE UP (ref 0–1.5)
INR BLD: 0.94 RATIO — SIGNIFICANT CHANGE UP (ref 0.88–1.16)
LIDOCAIN IGE QN: 119 U/L — SIGNIFICANT CHANGE UP (ref 73–393)
LYMPHOCYTES # BLD AUTO: 2.24 K/UL — SIGNIFICANT CHANGE UP (ref 1–3.3)
LYMPHOCYTES # BLD AUTO: 23.7 % — SIGNIFICANT CHANGE UP (ref 13–44)
MCHC RBC-ENTMCNC: 31.3 PG — SIGNIFICANT CHANGE UP (ref 27–34)
MCHC RBC-ENTMCNC: 32.9 GM/DL — SIGNIFICANT CHANGE UP (ref 32–36)
MCV RBC AUTO: 95.2 FL — SIGNIFICANT CHANGE UP (ref 80–100)
MONOCYTES # BLD AUTO: 0.52 K/UL — SIGNIFICANT CHANGE UP (ref 0–0.9)
MONOCYTES NFR BLD AUTO: 5.5 % — SIGNIFICANT CHANGE UP (ref 2–14)
NEUTROPHILS # BLD AUTO: 6.57 K/UL — SIGNIFICANT CHANGE UP (ref 1.8–7.4)
NEUTROPHILS NFR BLD AUTO: 69.4 % — SIGNIFICANT CHANGE UP (ref 43–77)
NRBC # BLD: 0 /100 WBCS — SIGNIFICANT CHANGE UP (ref 0–0)
PCP SPEC-MCNC: SIGNIFICANT CHANGE UP
PLATELET # BLD AUTO: 247 K/UL — SIGNIFICANT CHANGE UP (ref 150–400)
POTASSIUM SERPL-MCNC: 4.2 MMOL/L — SIGNIFICANT CHANGE UP (ref 3.5–5.3)
POTASSIUM SERPL-SCNC: 4.2 MMOL/L — SIGNIFICANT CHANGE UP (ref 3.5–5.3)
PROT SERPL-MCNC: 8.2 G/DL — SIGNIFICANT CHANGE UP (ref 6–8.3)
PROTHROM AB SERPL-ACNC: 11 SEC — SIGNIFICANT CHANGE UP (ref 10.6–13.6)
RBC # BLD: 4.38 M/UL — SIGNIFICANT CHANGE UP (ref 3.8–5.2)
RBC # FLD: 13.6 % — SIGNIFICANT CHANGE UP (ref 10.3–14.5)
SARS-COV-2 RNA SPEC QL NAA+PROBE: SIGNIFICANT CHANGE UP
SODIUM SERPL-SCNC: 139 MMOL/L — SIGNIFICANT CHANGE UP (ref 135–145)
WBC # BLD: 9.47 K/UL — SIGNIFICANT CHANGE UP (ref 3.8–10.5)
WBC # FLD AUTO: 9.47 K/UL — SIGNIFICANT CHANGE UP (ref 3.8–10.5)

## 2020-10-01 PROCEDURE — 71275 CT ANGIOGRAPHY CHEST: CPT | Mod: 26

## 2020-10-01 PROCEDURE — 99285 EMERGENCY DEPT VISIT HI MDM: CPT

## 2020-10-01 RX ORDER — SODIUM CHLORIDE 9 MG/ML
1000 INJECTION INTRAMUSCULAR; INTRAVENOUS; SUBCUTANEOUS ONCE
Refills: 0 | Status: COMPLETED | OUTPATIENT
Start: 2020-10-01 | End: 2020-10-01

## 2020-10-01 RX ORDER — SODIUM CHLORIDE 9 MG/ML
3 INJECTION INTRAMUSCULAR; INTRAVENOUS; SUBCUTANEOUS EVERY 8 HOURS
Refills: 0 | Status: DISCONTINUED | OUTPATIENT
Start: 2020-10-01 | End: 2020-10-01

## 2020-10-01 RX ORDER — ALPRAZOLAM 0.25 MG
0.5 TABLET ORAL ONCE
Refills: 0 | Status: DISCONTINUED | OUTPATIENT
Start: 2020-10-01 | End: 2020-10-01

## 2020-10-01 RX ORDER — PANTOPRAZOLE SODIUM 20 MG/1
40 TABLET, DELAYED RELEASE ORAL
Refills: 0 | Status: DISCONTINUED | OUTPATIENT
Start: 2020-10-01 | End: 2020-10-04

## 2020-10-01 RX ORDER — DIPHENHYDRAMINE HCL 50 MG
25 CAPSULE ORAL ONCE
Refills: 0 | Status: COMPLETED | OUTPATIENT
Start: 2020-10-01 | End: 2020-10-01

## 2020-10-01 RX ORDER — ONDANSETRON 8 MG/1
4 TABLET, FILM COATED ORAL ONCE
Refills: 0 | Status: COMPLETED | OUTPATIENT
Start: 2020-10-01 | End: 2020-10-01

## 2020-10-01 RX ORDER — ACETAMINOPHEN 500 MG
650 TABLET ORAL ONCE
Refills: 0 | Status: COMPLETED | OUTPATIENT
Start: 2020-10-01 | End: 2020-10-01

## 2020-10-01 RX ORDER — DIPHENHYDRAMINE HCL 50 MG
25 CAPSULE ORAL EVERY 6 HOURS
Refills: 0 | Status: DISCONTINUED | OUTPATIENT
Start: 2020-10-01 | End: 2020-10-04

## 2020-10-01 RX ORDER — ENOXAPARIN SODIUM 100 MG/ML
40 INJECTION SUBCUTANEOUS DAILY
Refills: 0 | Status: DISCONTINUED | OUTPATIENT
Start: 2020-10-01 | End: 2020-10-04

## 2020-10-01 RX ORDER — METOCLOPRAMIDE HCL 10 MG
10 TABLET ORAL ONCE
Refills: 0 | Status: COMPLETED | OUTPATIENT
Start: 2020-10-01 | End: 2020-10-01

## 2020-10-01 RX ORDER — MULTIVIT-MIN/FERROUS GLUCONATE 9 MG/15 ML
1 LIQUID (ML) ORAL DAILY
Refills: 0 | Status: DISCONTINUED | OUTPATIENT
Start: 2020-10-01 | End: 2020-10-04

## 2020-10-01 RX ADMIN — Medication 104 MILLIGRAM(S): at 08:46

## 2020-10-01 RX ADMIN — SODIUM CHLORIDE 1000 MILLILITER(S): 9 INJECTION INTRAMUSCULAR; INTRAVENOUS; SUBCUTANEOUS at 09:40

## 2020-10-01 RX ADMIN — Medication 10 MILLIGRAM(S): at 09:40

## 2020-10-01 RX ADMIN — Medication 25 MILLIGRAM(S): at 07:08

## 2020-10-01 RX ADMIN — SODIUM CHLORIDE 3 MILLILITER(S): 9 INJECTION INTRAMUSCULAR; INTRAVENOUS; SUBCUTANEOUS at 14:33

## 2020-10-01 RX ADMIN — Medication 1 MILLIGRAM(S): at 12:19

## 2020-10-01 RX ADMIN — Medication 125 MILLIGRAM(S): at 08:46

## 2020-10-01 RX ADMIN — ONDANSETRON 4 MILLIGRAM(S): 8 TABLET, FILM COATED ORAL at 07:05

## 2020-10-01 RX ADMIN — Medication 0.5 MILLIGRAM(S): at 08:46

## 2020-10-01 RX ADMIN — SODIUM CHLORIDE 1000 MILLILITER(S): 9 INJECTION INTRAMUSCULAR; INTRAVENOUS; SUBCUTANEOUS at 07:05

## 2020-10-01 RX ADMIN — Medication 25 MILLIGRAM(S): at 08:46

## 2020-10-01 NOTE — H&P ADULT - PROBLEM SELECTOR PLAN 7
IMPROVE VTE score: 0  Will manage with: Lovenox S/C     [ ] Previous VTE                                    3  [ ] Thrombophilia                                  2  [] Lower limb paralysis                        2  (unable to hold up >15 seconds)    [ ] Current Cancer (within 6 months)        2   [] Immobilization > 24 hrs                    1  [ ] ICU/CCU stay > 24 hrs                      1  [] Age > 60                                         1 APpears to be improving from previous labwork.  Suspect fatty liver in the setting of obesity.  - Obtain hepatitis panel  - US RUQ as per primary team

## 2020-10-01 NOTE — H&P ADULT - NSHPPHYSICALEXAM_GEN_ALL_CORE
General - Obese female, laying in bed, no acute distress  Eyes - PERRLA, EOM intact  Cardiovascular - +s1/s2 tachycardic  Lungs - Clear to ascultation, no use of accessory muscles, no crackles or wheezes.  Skin - NO RASH NOTED. patient provided pictures that revealed papular urticaria on the back and chest  Abdomen - Normal bowel sounds, abdomen soft and nontender  Extremities - No edema, cyanosis or clubbing  Musculoskeletal - 5/5 strength, normal range of motion, no swollen or erythematous  joints.  Neurological – Alert and oriented x 3, CN 2-12 grossly intact.

## 2020-10-01 NOTE — ED PROVIDER NOTE - CLINICAL SUMMARY MEDICAL DECISION MAKING FREE TEXT BOX
44 yo female presents with vomiting, diarrhea, rash, hyperventilating, and headache. Will obtain labs. Will give patient headache cocktail and will reassess.

## 2020-10-01 NOTE — H&P ADULT - PROBLEM SELECTOR PLAN 8
IMPROVE VTE score: 0  Will manage with: Lovenox S/C     [ ] Previous VTE                                    3  [ ] Thrombophilia                                  2  [] Lower limb paralysis                        2  (unable to hold up >15 seconds)    [ ] Current Cancer (within 6 months)        2   [] Immobilization > 24 hrs                    1  [ ] ICU/CCU stay > 24 hrs                      1  [] Age > 60                                         1 Patient with subarachnoid cyst on previous hospital visit  - Repeat CT head

## 2020-10-01 NOTE — H&P ADULT - ATTENDING COMMENTS
Patient is a 45 year old female, obese, with PMHx H. Pylori, recently finished triple therapy September is presenting with chief complaint of headache, palpitations, and rash. Patient was laying in bed, watching television when trying to sleep and then developed: headache, palpitations, chest tightness, SOB, rash, nausea, joint pain in the hands and knees, chills and dizziness. This is patient's third episode of the same symptoms. The first time was 2-3 months ago, and the previous episode was 15 days ago. On the previous episode, patient went to [facility] in which she was told it was anxiety and sent home on steroids for the rash, sucralfate for abdominal pain. She also had CT head at that time that revealed a subarachnoid cyst. For the past one year, patient has been dealing with pruritic rashes that started in her hands and have spread to her back, chest, and face. She presented with it this time, but resolved with the medications she took in the ED. Patient also had a recent history of EGD that revealed H. Pylori and patient completed therapy in September 2020. Patient drinks vodka which she endorses to be 2 times a week, each time approximately 3-4 drinks. Patient has had recent history of diarrhea. Denied any fevers, no weakness, no episodes of syncope, no pressure-like chest pain. Patient's last menstrual period was September 1st, and prior to that, she has not had any for 1 year.    In the ED, patient's rash and symptoms resolved with Xanax, Benadryl and IV steroids. However patient had persistent sinus tachycardia and subsequently admitted to tele. Labs unremarkable except for transaminitis that seems to be improving from past. Hemodynamically stable with sinus tachycardia. Patient obtained a CTA Chest that showed no PE, with pulm nodules, prominent hilar lymph node.       assessment   --- tachycardia possible 2nd to panic attack,  r/o acs, lung nodule, headache h/o allergic rash, anxiety,     plan  --  adm to tele, acs protocol, propanol 10mg bid, aspirin, statin, cont preadmit home meds, gi and dvt profilaxis  cbc, bmp, mg, phos, lipid, tsh, ce q8 x3, quantiferon tb gold, paul, rf, anti dsdna,     ct head  ct abd pelv    echo    cardio cons   pulm cons

## 2020-10-01 NOTE — H&P ADULT - ASSESSMENT
Patient is a 45 year old female with no diagnosed medical history, presenting with generalized symptoms suggestive of anxiety, however due to unresolved tachycardia, will be admitted for further workup.

## 2020-10-01 NOTE — SBIRT NOTE ADULT - NSSBIRTALCPOSREINDET_GEN_A_CORE
Pt admitted to drinking alcohol , claimed no problems with her alcohol intake. However, positive reinforcement via counseling provided.

## 2020-10-01 NOTE — H&P ADULT - PROBLEM SELECTOR PLAN 1
With persistent sinus tachycardia, even when acute episodes of various symptoms are controlled.  PE was ruled on with CTA chest.  -Started on propanolol 10mg BID for sinus tachy, questionable due to anxiety vs underlying endocrine pathology.  - Monitor on tele, highly doubt cardiac cause of symptoms

## 2020-10-01 NOTE — ED ADULT TRIAGE NOTE - CHIEF COMPLAINT QUOTE
BIBA for nausea and vomiting since 2am and now feeling dizzy, pt's  states pt was told recently she had a cyst in her brain

## 2020-10-01 NOTE — ED PROVIDER NOTE - CONSTITUTIONAL, MLM
normal... Hyperventilating, awake, alert, oriented to person, place, time/situation and in no apparent distress.

## 2020-10-01 NOTE — H&P ADULT - PROBLEM SELECTOR PLAN 3
With pruitic rash which patient attributes happens occasionally after alcohol use.  - NAPOLEON sent to rule out SLE  - Control symptoms with benadryl as needed, steroids

## 2020-10-01 NOTE — ED PROVIDER NOTE - OBJECTIVE STATEMENT
46 yo female presents with diffuse body itching and redness, hyperventilation, vomiting, diarrhea, and headache that started in the middle of the night. She thinks she is having a panic attack. A few weeks ago she had a CT scan that reportedly showed "small cysts in her brain."  She also reports she has been having an intermittent rash for 6 months. Patient states she drinks alcohol x2 a week. She denies any fever or chills.

## 2020-10-01 NOTE — ED PROVIDER NOTE - PROGRESS NOTE DETAILS
erythema resolved with allergy cocktail, however still tachycardic to 110. sat 95-97 on room air. admit to telemetry for cardiac workup

## 2020-10-01 NOTE — ED ADULT NURSE NOTE - OBJECTIVE STATEMENT
Pt alert oriented to self place and time, breathing unlabored room air. c/o HA and rash to upper extremities. Pt looks very anxious, shaking and redness to upper extremities, chest and back.  at the bed side state that Pt has cyst in brain from pervious CT scan.

## 2020-10-01 NOTE — H&P ADULT - PROBLEM SELECTOR PLAN 6
With EtOH use, as 3-4 drinks, 2x a week. Unlikely to undergo alcohol withdrawal,   Continue to monitor for signs and symptoms of alcohol withdrawal. Patient's last drink was on 9/30 in the evening time.

## 2020-10-01 NOTE — H&P ADULT - HISTORY OF PRESENT ILLNESS
Patient is a 45 year old female, obese, with PMHx H. Pylori, recently finished triple therapy September is presenting with chief complaint of headache, palpitations, and rash. Patient was laying in bed, watching television when trying to sleep and then developed: headache, palpitations, chest tightness, SOB, rash, nausea, joint pain in the hands and knees, chills and dizziness. This is patient's third episode of the same symptoms. The first time was 2-3 months ago, and the previous episode was 15 days ago. On the previous episode, patient went to [facility] in which she was told it was anxiety and sent home on steroids for the rash, sucralfate for abdominal pain. She also had CT head at that time that revealed a subarachnoid cyst. For the past one year, patient has been dealing with pruitic rashes that started in her hands and have spread to her back, chest, and face. She presented with it this time, but resolved with the medications she took in the ED. Patient also had a recent history of EGD that revealed H. Pylori and patient completed therapy in September 2020. Patient drinks vodka which she endorses to be 2 times a week, each time approximately 3-4 drinks. Patient has had recent history of diarrhea. Denied any fevers, no weakness, no episodes of syncope, no pressure-like chest pain. Patient's last menstrual period was September 1st, and prior to that, she has not had any for 1 year.    In the ED, patient's rash and symptoms resolved with Xanax, Benadryl and IV steroids. However patient had persistent sinus tachycardia and subsequently admitted to tele. Labs unremarkable except for transaminitis that seems to be improving from past. Hemodynamically stable with sinus tachycardia. Patient obtained a CTA Chest that showed no PE, with pulm nodules, prominent hilar lymph node.

## 2020-10-02 LAB
24R-OH-CALCIDIOL SERPL-MCNC: 18.1 NG/ML — LOW (ref 30–80)
A1C WITH ESTIMATED AVERAGE GLUCOSE RESULT: 5.4 % — SIGNIFICANT CHANGE UP (ref 4–5.6)
ALBUMIN SERPL ELPH-MCNC: 3.5 G/DL — SIGNIFICANT CHANGE UP (ref 3.5–5)
ALP SERPL-CCNC: 70 U/L — SIGNIFICANT CHANGE UP (ref 40–120)
ALT FLD-CCNC: 67 U/L DA — HIGH (ref 10–60)
ANA TITR SER: NEGATIVE — SIGNIFICANT CHANGE UP
ANION GAP SERPL CALC-SCNC: 4 MMOL/L — LOW (ref 5–17)
AST SERPL-CCNC: 40 U/L — SIGNIFICANT CHANGE UP (ref 10–40)
BILIRUB DIRECT SERPL-MCNC: 0.1 MG/DL — SIGNIFICANT CHANGE UP (ref 0–0.2)
BILIRUB INDIRECT FLD-MCNC: 0.6 MG/DL — SIGNIFICANT CHANGE UP (ref 0.2–1)
BILIRUB SERPL-MCNC: 0.7 MG/DL — SIGNIFICANT CHANGE UP (ref 0.2–1.2)
BUN SERPL-MCNC: 10 MG/DL — SIGNIFICANT CHANGE UP (ref 7–18)
CALCIUM SERPL-MCNC: 9.3 MG/DL — SIGNIFICANT CHANGE UP (ref 8.4–10.5)
CHLORIDE SERPL-SCNC: 105 MMOL/L — SIGNIFICANT CHANGE UP (ref 96–108)
CHOLEST SERPL-MCNC: 290 MG/DL — HIGH (ref 10–199)
CO2 SERPL-SCNC: 28 MMOL/L — SIGNIFICANT CHANGE UP (ref 22–31)
CREAT SERPL-MCNC: 0.75 MG/DL — SIGNIFICANT CHANGE UP (ref 0.5–1.3)
CRP SERPL-MCNC: 0.63 MG/DL — HIGH (ref 0–0.4)
DRVVT SCREEN TO CONFIRM RATIO: SIGNIFICANT CHANGE UP
ESTIMATED AVERAGE GLUCOSE: 108 MG/DL — SIGNIFICANT CHANGE UP (ref 68–114)
FERRITIN SERPL-MCNC: 135 NG/ML — SIGNIFICANT CHANGE UP (ref 15–150)
FOLATE SERPL-MCNC: 8.4 NG/ML — SIGNIFICANT CHANGE UP
FSH SERPL-MCNC: 21.9 IU/L — SIGNIFICANT CHANGE UP
GLUCOSE SERPL-MCNC: 114 MG/DL — HIGH (ref 70–99)
HAV IGM SER-ACNC: SIGNIFICANT CHANGE UP
HBV CORE IGM SER-ACNC: SIGNIFICANT CHANGE UP
HBV SURFACE AG SER-ACNC: SIGNIFICANT CHANGE UP
HCV AB S/CO SERPL IA: 0.07 S/CO — SIGNIFICANT CHANGE UP (ref 0–0.99)
HCV AB SERPL-IMP: SIGNIFICANT CHANGE UP
HDLC SERPL-MCNC: 72 MG/DL — SIGNIFICANT CHANGE UP
IRON SATN MFR SERPL: 256 UG/DL — HIGH (ref 40–150)
IRON SATN MFR SERPL: 60 % — HIGH (ref 15–50)
LA NT DPL PPP QL: 25.8 SEC — SIGNIFICANT CHANGE UP
LIPID PNL WITH DIRECT LDL SERPL: 197 MG/DL — SIGNIFICANT CHANGE UP
MAGNESIUM SERPL-MCNC: 2.3 MG/DL — SIGNIFICANT CHANGE UP (ref 1.6–2.6)
NORMALIZED SCT PPP-RTO: 0.95 RATIO — SIGNIFICANT CHANGE UP (ref 0–1.16)
NORMALIZED SCT PPP-RTO: SIGNIFICANT CHANGE UP
PHOSPHATE SERPL-MCNC: 2.5 MG/DL — SIGNIFICANT CHANGE UP (ref 2.5–4.5)
POTASSIUM SERPL-MCNC: 4 MMOL/L — SIGNIFICANT CHANGE UP (ref 3.5–5.3)
POTASSIUM SERPL-SCNC: 4 MMOL/L — SIGNIFICANT CHANGE UP (ref 3.5–5.3)
PROT SERPL-MCNC: 8.2 G/DL — SIGNIFICANT CHANGE UP (ref 6–8.3)
RHEUMATOID FACT SERPL-ACNC: <10 IU/ML — SIGNIFICANT CHANGE UP (ref 0–13)
SARS-COV-2 IGG SERPL QL IA: NEGATIVE — SIGNIFICANT CHANGE UP
SARS-COV-2 IGM SERPL IA-ACNC: 0.09 INDEX — SIGNIFICANT CHANGE UP
SODIUM SERPL-SCNC: 137 MMOL/L — SIGNIFICANT CHANGE UP (ref 135–145)
T4 AB SER-ACNC: 7.8 UG/DL — SIGNIFICANT CHANGE UP (ref 4.6–12)
T4 FREE SERPL-MCNC: 0.9 NG/DL — SIGNIFICANT CHANGE UP (ref 0.9–1.8)
TIBC SERPL-MCNC: 425 UG/DL — SIGNIFICANT CHANGE UP (ref 250–450)
TOTAL CHOLESTEROL/HDL RATIO MEASUREMENT: 4 RATIO — SIGNIFICANT CHANGE UP (ref 3.3–7.1)
TRIGL SERPL-MCNC: 104 MG/DL — SIGNIFICANT CHANGE UP (ref 10–149)
TROPONIN I SERPL-MCNC: <0.015 NG/ML — SIGNIFICANT CHANGE UP (ref 0–0.04)
TSH SERPL-MCNC: 0.58 UU/ML — SIGNIFICANT CHANGE UP (ref 0.34–4.82)
UIBC SERPL-MCNC: 169 UG/DL — SIGNIFICANT CHANGE UP (ref 110–370)
VIT B12 SERPL-MCNC: 596 PG/ML — SIGNIFICANT CHANGE UP (ref 232–1245)

## 2020-10-02 PROCEDURE — 74176 CT ABD & PELVIS W/O CONTRAST: CPT | Mod: 26

## 2020-10-02 PROCEDURE — 99223 1ST HOSP IP/OBS HIGH 75: CPT

## 2020-10-02 PROCEDURE — 70450 CT HEAD/BRAIN W/O DYE: CPT | Mod: 26

## 2020-10-02 RX ORDER — ACETAMINOPHEN 500 MG
650 TABLET ORAL EVERY 6 HOURS
Refills: 0 | Status: DISCONTINUED | OUTPATIENT
Start: 2020-10-02 | End: 2020-10-04

## 2020-10-02 RX ORDER — AMITRIPTYLINE HCL 25 MG
25 TABLET ORAL AT BEDTIME
Refills: 0 | Status: DISCONTINUED | OUTPATIENT
Start: 2020-10-02 | End: 2020-10-04

## 2020-10-02 RX ORDER — ERGOCALCIFEROL 1.25 MG/1
50000 CAPSULE ORAL ONCE
Refills: 0 | Status: COMPLETED | OUTPATIENT
Start: 2020-10-02 | End: 2020-10-02

## 2020-10-02 RX ADMIN — Medication 25 MILLIGRAM(S): at 21:03

## 2020-10-02 RX ADMIN — ERGOCALCIFEROL 50000 UNIT(S): 1.25 CAPSULE ORAL at 13:47

## 2020-10-02 RX ADMIN — PANTOPRAZOLE SODIUM 40 MILLIGRAM(S): 20 TABLET, DELAYED RELEASE ORAL at 05:15

## 2020-10-02 RX ADMIN — Medication 40 MILLIGRAM(S): at 13:47

## 2020-10-02 RX ADMIN — Medication 1 TABLET(S): at 13:48

## 2020-10-02 RX ADMIN — Medication 650 MILLIGRAM(S): at 08:25

## 2020-10-02 RX ADMIN — ENOXAPARIN SODIUM 40 MILLIGRAM(S): 100 INJECTION SUBCUTANEOUS at 13:47

## 2020-10-02 NOTE — PROGRESS NOTE ADULT - PROBLEM SELECTOR PLAN 7
APpears to be improving from previous labwork.  Suspect fatty liver in the setting of obesity.  - Obtain hepatitis panel  - US RUQ as per primary team resolved

## 2020-10-02 NOTE — CONSULT NOTE ADULT - SUBJECTIVE AND OBJECTIVE BOX
CHIEF COMPLAINT:Patient is a 45y old  Female who presents with a chief complaint of Sinus Tachycardia.      HPI:  Patient is a 45 year old female, obese, with PMHx H. Pylori, recently finished triple therapy September is presenting with chief complaint of headache, palpitations, and rash. Patient was laying in bed, watching television when trying to sleep and then developed: headache, palpitations, chest tightness, SOB, rash, nausea, joint pain in the hands and knees, chills and dizziness. This is patient's third episode of the same symptoms. The first time was 2-3 months ago, and the previous episode was 15 days ago. On the previous episode, patient went to [facility] in which she was told it was anxiety and sent home on steroids for the rash, sucralfate for abdominal pain. She also had CT head at that time that revealed a subarachnoid cyst. For the past one year, patient has been dealing with pruitic rashes that started in her hands and have spread to her back, chest, and face. She presented with it this time, but resolved with the medications she took in the ED. Patient also had a recent history of EGD that revealed H. Pylori and patient completed therapy in September 2020. Patient drinks vodka which she endorses to be 2 times a week, each time approximately 3-4 drinks. Patient has had recent history of diarrhea. Denied any fevers, no weakness, no episodes of syncope, no pressure-like chest pain. Patient's last menstrual period was September 1st, and prior to that, she has not had any for 1 year.    In the ED, patient's rash and symptoms resolved with Xanax, Benadryl and IV steroids. However patient had persistent sinus tachycardia and subsequently admitted to tele. Labs unremarkable except for transaminitis that seems to be improving from past. Hemodynamically stable with sinus tachycardia. Patient obtained a CTA Chest that showed no PE, with pulm nodules, prominent hilar lymph node. (01 Oct 2020 16:34)      PAST MEDICAL & SURGICAL HISTORY:  Ectopic pregnancy    History of appendectomy        MEDICATIONS  (STANDING):  enoxaparin Injectable 40 milliGRAM(s) SubCutaneous daily  methylPREDNISolone sodium succinate Injectable 40 milliGRAM(s) IV Push once  multivitamin/minerals 1 Tablet(s) Oral daily  pantoprazole    Tablet 40 milliGRAM(s) Oral before breakfast  propranolol 10 milliGRAM(s) Oral two times a day    MEDICATIONS  (PRN):  acetaminophen   Tablet .. 650 milliGRAM(s) Oral every 6 hours PRN Moderate Pain (4 - 6)  diphenhydrAMINE 25 milliGRAM(s) Oral every 6 hours PRN Rash and/or Itching  LORazepam   Injectable 2 milliGRAM(s) IV Push every 4 hours PRN CIWA>7      FAMILY HISTORY:No cardiac history      SOCIAL HISTORY:    [x ] Non-smoker    [x ] Alcohol-denies    Allergies    No Known Allergies    Intolerances    	    REVIEW OF SYSTEMS:  CONSTITUTIONAL: No fever, weight loss, or fatigue  EYES: No eye pain, visual disturbances, or discharge  ENT:  No difficulty hearing, tinnitus, vertigo; No sinus or throat pain  NECK: No pain or stiffness  RESPIRATORY: No cough, wheezing, chills or hemoptysis; No Shortness of Breath  CARDIOVASCULAR: No chest pain, + palpitations, No passing out, dizziness, or leg swelling  GASTROINTESTINAL: No abdominal or epigastric pain. No nausea, vomiting, or hematemesis; No diarrhea or constipation. No melena or hematochezia.  GENITOURINARY: No dysuria, frequency, hematuria, or incontinence  NEUROLOGICAL: No headaches, memory loss, loss of strength, numbness, or tremors  SKIN: No itching, burning, rashes, or lesions   LYMPH Nodes: No enlarged glands  ENDOCRINE: No heat or cold intolerance; No hair loss  MUSCULOSKELETAL: No joint pain or swelling; No muscle, back, or extremity pain  PSYCHIATRIC: No depression, anxiety, mood swings, or difficulty sleeping  HEME/LYMPH: No easy bruising, or bleeding gums  ALLERGY AND IMMUNOLOGIC: No hives or eczema	      PHYSICAL EXAM:  T(C): 36.4 (10-02-20 @ 07:40), Max: 37.1 (10-01-20 @ 15:15)  HR: 62 (10-02-20 @ 07:40) (62 - 123)  BP: 123/63 (10-02-20 @ 07:40) (123/63 - 140/78)  RR: 18 (10-02-20 @ 07:40) (16 - 18)  SpO2: 100% (10-02-20 @ 07:40) (94% - 100%)  Wt(kg): --  I&O's Summary      Appearance: Normal	  HEENT:   Normal oral mucosa, PERRL, EOMI	  Lymphatic: No lymphadenopathy  Cardiovascular: Normal S1 S2, No JVD, No murmurs, No edema  Respiratory: Lungs clear to auscultation	  Psychiatry: A & O x 3, Mood & affect appropriate  Gastrointestinal:  Soft, Non-tender, + BS	  Skin: No rashes, No ecchymoses, No cyanosis	  Neurologic: Non-focal  Extremities: Normal range of motion, No clubbing, cyanosis or edema  Vascular: Peripheral pulses palpable 2+ bilaterally        ECG:  	sinus tach with nl axis    LABS:	 	      CARDIAC MARKERS ( 02 Oct 2020 07:12 )  <0.015 ng/mL / x     / x     / x     / x      CARDIAC MARKERS ( 01 Oct 2020 07:02 )  <0.015 ng/mL / x     / 104 U/L / x     / x                                  13.6   15.83 )-----------( 249      ( 02 Oct 2020 07:12 )             41.4     10-02    137  |  105  |  10  ----------------------------<  114<H>  4.0   |  28  |  0.75    Ca    9.3      02 Oct 2020 07:12  Phos  2.5     10-02  Mg     2.3     10-02    TPro  8.2  /  Alb  3.5  /  TBili  0.7  /  DBili  0.1  /  AST  40  /  ALT  67<H>  /  AlkPhos  70  10-02      Lipid Profile: Cholesterol 290    HDL 72      TSH: Thyroid Stimulating Hormone, Serum: 0.58 uU/mL (10-02 @ 07:12)        EXAM:  CT BRAIN                            PROCEDURE DATE:  10/02/2020          INTERPRETATION:  CLINICAL STATEMENT: Pain.    TECHNIQUE: CT of the head was performed without IV contrast.  RAPID artificial intelligence was utilized for the preliminary evaluation of intracranial hemorrhage.    COMPARISON: None.    FINDINGS:  There is no acute intracranial hemorrhage, parenchymal mass, or midline shift. There is no acute territorial infarct. There is no hydrocephalus. 3.0 x 2.3 cm arachnoid cyst noted in the left middle cranial fossa. Incidental note is made of atrophic optic nerves.. Partial empty sella    The cranium is intact. The visualized paranasal sinuses are well-aerated.    IMPRESSION:  No acute intracranial hemorrhage or acute territorial infarct.  If symptoms persist, follow-up MRI exam recommended.          EXAM:  CT ANGIO CHEST (W)AW IC                            PROCEDURE DATE:  10/01/2020          INTERPRETATION:  CLINICAL INDICATION: 45 years  Female with eval for PE. Tachycardia..    COMPARISON: None.    PROCEDURE:  CT Angiography of the Chest.  50 ml of Omnipaque 350 was injected intravenously. 50 ml were discarded.  Sagittal and coronal reformats were performed as well as 3D (MIP) reconstructions.    FINDINGS:    LUNGS AND AIRWAYS: Patent central airways.  No focal consolidation. 6 mm subpleural nodule superior segment right lower lobe (6:74).  PLEURA: No pleural effusion.  MEDIASTINUM AND SUGAR: Enlarged right hilar lymph node measuring 1.2 cm in short axis. Prominent left hilar lymph node measuring 7 mm in short axis. Tiny calcified subcarinal lymph nodes. Thickened esophagus.  VESSELS: No large central pulmonary embolism. Evaluation of the segmental and subsegmental branches is limited by motion artifact and delayed timing of injection.  HEART: Heart size is normal. No pericardialeffusion.  CHEST WALL AND LOWER NECK: Within normal limits.  VISUALIZED UPPER ABDOMEN: Within normal limits.  BONES: Within normal limits.    IMPRESSION:  No large central pulmonary emboli. Evaluation of the segmental and subsegmental branches is limited as above.    Enlarged right hilar lymph node and prominent left hilar lymph node, nonspecific.    6 mm nodule superior segment right lower lobe. Recommend 6 month follow-up to ensure stability or resolution.              KYLIE COUCH M.D., ATTENDING RADIOLOGIST

## 2020-10-02 NOTE — CONSULT NOTE ADULT - ASSESSMENT
Patient is a 45 year old female, obese, with PMHx H. Pylori, recently finished triple therapy September is presenting with chief complaint of headache, palpitations, and rash.  1.Tele monitoring.  2.Echocardiogram.  3.Neurology eval.  4.Rash-benadryl,steroids.  5.Cont b blocker.  6.GI and DVT prophylaxis.

## 2020-10-02 NOTE — CONSULT NOTE ADULT - SUBJECTIVE AND OBJECTIVE BOX
NEUROLOGY CONSULT NOTE    NAME:  SERVANDO FAITH      ASSESSMENT:  45 RHF with persistent bitemporal and posterior headaches, exacerbated by anxiety      RECOMMENDATIONS:    - Start amitriptyline 25mg PO daily for managing chronic daily headache       - Monitor for arrhythmia or other cardiac abnormalities while patient is on amitriptyline (patient's palpitations seem to be temporary and only associated with anxiety attack)    - Management of anxiety as per primary team    - Follow up recommendations from Cardiology and Pulmonology recommendations    - Routine follow-up in Neurology clinic for headache management        NOTE TO BE COMPLETED - PLEASE REFER TO ABOVE ONLY AND IGNORE INFORMATION BELOW    *******************************      CHIEF COMPLAINT:  Patient is a 45y old  Female who presents with a chief complaint of Sinus Tachycardia (03 Oct 2020 06:20)      HPI:  Patient is a 45 year old female, obese, with PMHx H. Pylori, recently finished triple therapy September is presenting with chief complaint of headache, palpitations, and rash. Patient was laying in bed, watching television when trying to sleep and then developed: headache, palpitations, chest tightness, SOB, rash, nausea, joint pain in the hands and knees, chills and dizziness. This is patient's third episode of the same symptoms. The first time was 2-3 months ago, and the previous episode was 15 days ago. On the previous episode, patient went to [facility] in which she was told it was anxiety and sent home on steroids for the rash, sucralfate for abdominal pain. She also had CT head at that time that revealed a subarachnoid cyst. For the past one year, patient has been dealing with pruitic rashes that started in her hands and have spread to her back, chest, and face. She presented with it this time, but resolved with the medications she took in the ED. Patient also had a recent history of EGD that revealed H. Pylori and patient completed therapy in September 2020. Patient drinks vodka which she endorses to be 2 times a week, each time approximately 3-4 drinks. Patient has had recent history of diarrhea. Denied any fevers, no weakness, no episodes of syncope, no pressure-like chest pain. Patient's last menstrual period was September 1st, and prior to that, she has not had any for 1 year.    In the ED, patient's rash and symptoms resolved with Xanax, Benadryl and IV steroids. However patient had persistent sinus tachycardia and subsequently admitted to [facility]. Labs unremarkable except for transaminitis that seems to be improving from past. Hemodynamically stable with sinus tachycardia. Patient obtained a CTA Chest that showed no PE, with pulm nodules, prominent hilar lymph node. (01 Oct 2020 16:34)      NEURO HPI:      PAST MEDICAL & SURGICAL HISTORY:  Ectopic pregnancy    History of appendectomy        MEDICATIONS:  acetaminophen   Tablet .. 650 milliGRAM(s) Oral every 6 hours PRN  amitriptyline 25 milliGRAM(s) Oral at bedtime  diphenhydrAMINE 25 milliGRAM(s) Oral every 6 hours PRN  enoxaparin Injectable 40 milliGRAM(s) SubCutaneous daily  LORazepam   Injectable 2 milliGRAM(s) IV Push every 4 hours PRN  methylPREDNISolone sodium succinate Injectable 40 milliGRAM(s) IV Push daily  multivitamin/minerals 1 Tablet(s) Oral daily  pantoprazole    Tablet 40 milliGRAM(s) Oral before breakfast  propranolol 10 milliGRAM(s) Oral two times a day      ALLERGIES:  No Known Allergies      FAMILY HISTORY:      SOCIAL HISTORY:  Denies alcohol, tobacco, and illicit drug use    REVIEW OF SYSTEMS:  GENERAL: No fever, weight changes, fatigue  EYES: No eye pain or discharge  EAR/NOSE/MOUTH/THROAT: No sinus or throat pain; No difficulty hearing  NECK: No pain or stiffness  RESPIRATORY: No cough, wheezing, chills, or hemoptysis  CARDIOVASCULAR: No chest pain, palpitations, shortness of breath, or dyspnea on exertion  GASTROINTESTINAL: No abdominal pain, nausea, vomiting, hematemesis, diarrhea, or constipation  GENITOURINARY: No dysuria, frequency, hematuria, or incontinence  SKIN: No rashes or lesions  ENDOCRINE: No heat or cold intolerance  HEMATOLOGIC: No easy bruising or bleeding  PSYCHIATRIC: No depression, anxiety, or mood swings  MUSCULOSKELETAL: No joint pain or swelling  NEUROLOGICAL: As per HPI      OBJECTIVE:    Vital Signs Last 24 Hrs  T(C): 36.4 (02 Oct 2020 07:40), Max: 37.1 (01 Oct 2020 15:15)  T(F): 97.5 (02 Oct 2020 07:40), Max: 98.8 (01 Oct 2020 15:15)  HR: 62 (02 Oct 2020 07:40) (62 - 123)  BP: 123/63 (02 Oct 2020 07:40) (123/63 - 140/78)  BP(mean): 86 (01 Oct 2020 15:15) (86 - 86)  RR: 18 (02 Oct 2020 07:40) (16 - 18)  SpO2: 100% (02 Oct 2020 07:40) (94% - 100%)    General Examination:  General: No acute distress  HEENT: Atraumatic, Normocephalic  Respiratory: CTA B/l.  No crackles, rhonchi, or wheezes.  Cardiovascular: RRR.  Normal S1 & S2.  Normal b/l radial and pedal pulses.    Neurological Examination:  General / Mental Status: AAO x 3.  No aphasia or dysarthria.  Naming and repetition intact.  Cranial Nerves: VFF x 4.  PERRL.  EOMI x 2, No nystagmus or diplopia.  B/l V1-V3 equal and intact to light touch and pinprick.  Symmetric facial movement and palate elevation.  B/l hearing equal to finger rub.  5/5 strength with b/l sternocleidomastoid & trapezius.  Midline tongue protrusion, with no atrophy or fasciculations.  Motor: Normal bulk & tone in all four extremities.  5/5 strength throughout all four extremities.  No downward drift, rigidity, spasticity, or tremors in any of the four extremities.  Sensory: Intact to light touch and pinprick in all four extremities.  Negative Romberg.  Reflex: 2+ and symmetric at b/l biceps, triceps, brachioradialis, patellae, and ankles.  Downgoing toes b/l.  Coordination: No dysmetria with b/l finger-to-nose and heel raise tests.  Symmetric rapid alternating movements b/l.  Gait: Normal, narrow-based gait.  No difficulty with tiptoe, heel, and tandem gaits.        LABORATORY VALUES:                        13.6   15.83 )-----------( 249      ( 02 Oct 2020 07:12 )             41.4       10-02    137  |  105  |  10  ----------------------------<  114<H>  4.0   |  28  |  0.75    Ca    9.3      02 Oct 2020 07:12  Phos  2.5     10-02  Mg     2.3     10-02    TPro  8.2  /  Alb  3.5  /  TBili  0.7  /  DBili  0.1  /  AST  40  /  ALT  67<H>  /  AlkPhos  70  10-02    10-02 Chol 290<H>  HDL 72 Trig 104    Folate, Serum: 8.4 ng/mL (10-02-20 @ 10:20)  Vitamin B12, Serum: 596 pg/mL (10-02-20 @ 10:20)          NEUROIMAGING:          Please contact the Neurology consult service with any questions.    Jay Sims MD   of Neurology  [facility] at [facility] NEUROLOGY CONSULT NOTE    NAME:  SERVANDO FAITH      ASSESSMENT:  45 RHF with persistent bitemporal and posterior headaches, exacerbated by anxiety      RECOMMENDATIONS:    - Start amitriptyline 25mg PO daily for managing chronic daily headache       - Monitor for arrhythmia or other cardiac abnormalities while patient is on amitriptyline (patient's palpitations seem to be temporary and only associated with anxiety attack)    - Management of anxiety as per primary team    - Follow up recommendations from Cardiology and Pulmonology recommendations    - Routine follow-up in Neurology clinic for headache management        NOTE TO BE COMPLETED - PLEASE REFER TO ABOVE ONLY AND IGNORE INFORMATION BELOW    *******************************      CHIEF COMPLAINT:  Patient is a 45y old  Female who presents with a chief complaint of Sinus Tachycardia (03 Oct 2020 06:20)      HPI:  Patient is a 45 year old female, obese, with PMHx H. Pylori, recently finished triple therapy September is presenting with chief complaint of headache, palpitations, and rash. Patient was laying in bed, watching television when trying to sleep and then developed: headache, palpitations, chest tightness, SOB, rash, nausea, joint pain in the hands and knees, chills and dizziness. This is patient's third episode of the same symptoms. The first time was 2-3 months ago, and the previous episode was 15 days ago. On the previous episode, patient went to [facility] in which she was told it was anxiety and sent home on steroids for the rash, sucralfate for abdominal pain. She also had CT head at that time that revealed a subarachnoid cyst. For the past one year, patient has been dealing with pruitic rashes that started in her hands and have spread to her back, chest, and face. She presented with it this time, but resolved with the medications she took in the ED. Patient also had a recent history of EGD that revealed H. Pylori and patient completed therapy in September 2020. Patient drinks vodka which she endorses to be 2 times a week, each time approximately 3-4 drinks. Patient has had recent history of diarrhea. Denied any fevers, no weakness, no episodes of syncope, no pressure-like chest pain. Patient's last menstrual period was September 1st, and prior to that, she has not had any for 1 year.    In the ED, patient's rash and symptoms resolved with Xanax, Benadryl and IV steroids. However patient had persistent sinus tachycardia and subsequently admitted to [facility]. Labs unremarkable except for transaminitis that seems to be improving from past. Hemodynamically stable with sinus tachycardia. Patient obtained a CTA Chest that showed no PE, with pulm nodules, prominent hilar lymph node. (01 Oct 2020 16:34)      NEURO HPI:      PAST MEDICAL & SURGICAL HISTORY:  Ectopic pregnancy    History of appendectomy        MEDICATIONS:  acetaminophen   Tablet .. 650 milliGRAM(s) Oral every 6 hours PRN  amitriptyline 25 milliGRAM(s) Oral at bedtime  diphenhydrAMINE 25 milliGRAM(s) Oral every 6 hours PRN  enoxaparin Injectable 40 milliGRAM(s) SubCutaneous daily  LORazepam   Injectable 2 milliGRAM(s) IV Push every 4 hours PRN  methylPREDNISolone sodium succinate Injectable 40 milliGRAM(s) IV Push daily  multivitamin/minerals 1 Tablet(s) Oral daily  pantoprazole    Tablet 40 milliGRAM(s) Oral before breakfast  propranolol 10 milliGRAM(s) Oral two times a day      ALLERGIES:  No Known Allergies    FAMILY HISTORY:  Denies family history of headaches    SOCIAL HISTORY:  Social alcohol use; Denies binge drinking  Denies tobacco and illicit drug use    REVIEW OF SYSTEMS:  GENERAL: No fever, weight changes, fatigue  EYES: No eye pain or discharge  EAR/NOSE/MOUTH/THROAT: No sinus or throat pain; No difficulty hearing  NECK: No pain or stiffness  RESPIRATORY: No cough, wheezing, chills, or hemoptysis  CARDIOVASCULAR: No chest pain, palpitations, shortness of breath, or dyspnea on exertion  GASTROINTESTINAL: No abdominal pain, nausea, vomiting, hematemesis, diarrhea, or constipation  GENITOURINARY: No dysuria, frequency, hematuria, or incontinence  SKIN: No rashes or lesions  ENDOCRINE: No heat or cold intolerance  HEMATOLOGIC: No easy bruising or bleeding  PSYCHIATRIC: No depression, anxiety, or mood swings  MUSCULOSKELETAL: No joint pain or swelling  NEUROLOGICAL: As per HPI      OBJECTIVE:    Vital Signs Last 24 Hrs  T(C): 36.4 (02 Oct 2020 07:40), Max: 37.1 (01 Oct 2020 15:15)  T(F): 97.5 (02 Oct 2020 07:40), Max: 98.8 (01 Oct 2020 15:15)  HR: 62 (02 Oct 2020 07:40) (62 - 123)  BP: 123/63 (02 Oct 2020 07:40) (123/63 - 140/78)  BP(mean): 86 (01 Oct 2020 15:15) (86 - 86)  RR: 18 (02 Oct 2020 07:40) (16 - 18)  SpO2: 100% (02 Oct 2020 07:40) (94% - 100%)    General Examination:  General: No acute distress  HEENT: Atraumatic, Normocephalic  Respiratory: CTA B/l.  No crackles, rhonchi, or wheezes.  Cardiovascular: RRR.  Normal S1 & S2.  Normal b/l radial and pedal pulses.    Neurological Examination:  General / Mental Status: AAO x 3.  No aphasia or dysarthria.  Naming and repetition intact.  Cranial Nerves: VFF x 4.  PERRL.  EOMI x 2, No nystagmus or diplopia.  B/l V1-V3 equal and intact to light touch and pinprick.  Symmetric facial movement and palate elevation.  B/l hearing equal to finger rub.  5/5 strength with b/l sternocleidomastoid & trapezius.  Midline tongue protrusion, with no atrophy or fasciculations.  Motor: Normal bulk & tone in all four extremities.  5/5 strength throughout all four extremities.  No downward drift, rigidity, spasticity, or tremors in any of the four extremities.  Sensory: Intact to light touch and pinprick in all four extremities.  Negative Romberg.  Reflex: 2+ and symmetric at b/l biceps, triceps, brachioradialis, patellae, and ankles.  Downgoing toes b/l.  Coordination: No dysmetria with b/l finger-to-nose and heel raise tests.  Symmetric rapid alternating movements b/l.  Gait: Normal, narrow-based gait.  No difficulty with tiptoe, heel, and tandem gaits.        LABORATORY VALUES:                        13.6   15.83 )-----------( 249      ( 02 Oct 2020 07:12 )             41.4       10-02    137  |  105  |  10  ----------------------------<  114<H>  4.0   |  28  |  0.75    Ca    9.3      02 Oct 2020 07:12  Phos  2.5     10-02  Mg     2.3     10-02    TPro  8.2  /  Alb  3.5  /  TBili  0.7  /  DBili  0.1  /  AST  40  /  ALT  67<H>  /  AlkPhos  70  10-02    10-02 Chol 290<H>  HDL 72 Trig 104    Folate, Serum: 8.4 ng/mL (10-02-20 @ 10:20)  Vitamin B12, Serum: 596 pg/mL (10-02-20 @ 10:20)          NEUROIMAGING:          Please contact the Neurology consult service with any questions.    Jay Sims MD   of Neurology  [facility] at [facility] NEUROLOGY CONSULT NOTE    NAME:  SERVANDO FAITH      ASSESSMENT:  45 RHF with persistent bitemporal and posterior headaches, exacerbated by anxiety      RECOMMENDATIONS:    - Start amitriptyline 25mg PO daily for managing chronic daily headache       - Monitor for arrhythmia or other cardiac abnormalities while patient is on amitriptyline (patient's palpitations seem to be temporary and only associated with anxiety attack)    - Management of anxiety as per primary team    - Follow up recommendations from Cardiology and Pulmonology recommendations    - Routine follow-up in Neurology clinic for headache management          *******************************      CHIEF COMPLAINT:  Patient is a 45y old  Female who presents with a chief complaint of Sinus Tachycardia (03 Oct 2020 06:20)      HPI:  Patient is a 45 year old female, obese, with PMHx H. Pylori, recently finished triple therapy September is presenting with chief complaint of headache, palpitations, and rash. Patient was laying in bed, watching television when trying to sleep and then developed: headache, palpitations, chest tightness, SOB, rash, nausea, joint pain in the hands and knees, chills and dizziness. This is patient's third episode of the same symptoms. The first time was 2-3 months ago, and the previous episode was 15 days ago. On the previous episode, patient went to [facility] in which she was told it was anxiety and sent home on steroids for the rash, sucralfate for abdominal pain. She also had CT head at that time that revealed a subarachnoid cyst. For the past one year, patient has been dealing with pruitic rashes that started in her hands and have spread to her back, chest, and face. She presented with it this time, but resolved with the medications she took in the ED. Patient also had a recent history of EGD that revealed H. Pylori and patient completed therapy in September 2020. Patient drinks vodka which she endorses to be 2 times a week, each time approximately 3-4 drinks. Patient has had recent history of diarrhea. Denied any fevers, no weakness, no episodes of syncope, no pressure-like chest pain. Patient's last menstrual period was September 1st, and prior to that, she has not had any for 1 year.    In the ED, patient's rash and symptoms resolved with Xanax, Benadryl and IV steroids. However patient had persistent sinus tachycardia and subsequently admitted to [facility]. Labs unremarkable except for transaminitis that seems to be improving from past. Hemodynamically stable with sinus tachycardia. Patient obtained a CTA Chest that showed no PE, with pulm nodules, prominent hilar lymph node. (01 Oct 2020 16:34)      NEURO HPI:  45 RHF had an episode of chest pain and     PAST MEDICAL & SURGICAL HISTORY:  Ectopic pregnancy    History of appendectomy        MEDICATIONS:  acetaminophen   Tablet .. 650 milliGRAM(s) Oral every 6 hours PRN  amitriptyline 25 milliGRAM(s) Oral at bedtime  diphenhydrAMINE 25 milliGRAM(s) Oral every 6 hours PRN  enoxaparin Injectable 40 milliGRAM(s) SubCutaneous daily  LORazepam   Injectable 2 milliGRAM(s) IV Push every 4 hours PRN  methylPREDNISolone sodium succinate Injectable 40 milliGRAM(s) IV Push daily  multivitamin/minerals 1 Tablet(s) Oral daily  pantoprazole    Tablet 40 milliGRAM(s) Oral before breakfast  propranolol 10 milliGRAM(s) Oral two times a day      ALLERGIES:  No Known Allergies    FAMILY HISTORY:  Denies family history of headaches    SOCIAL HISTORY:  Social alcohol use; Denies binge drinking  Denies tobacco and illicit drug use    REVIEW OF SYSTEMS:  GENERAL: No fever, weight changes, fatigue  EYES: No eye pain or discharge  EAR/NOSE/MOUTH/THROAT: No sinus or throat pain; No difficulty hearing  NECK: No pain or stiffness  RESPIRATORY: No cough, wheezing, chills, or hemoptysis  CARDIOVASCULAR: No chest pain, palpitations, shortness of breath, or dyspnea on exertion  GASTROINTESTINAL: No abdominal pain, nausea, vomiting, hematemesis, diarrhea, or constipation  GENITOURINARY: No dysuria, frequency, hematuria, or incontinence  SKIN: No rashes or lesions  ENDOCRINE: No heat or cold intolerance  HEMATOLOGIC: No easy bruising or bleeding  PSYCHIATRIC: No depression, anxiety, or mood swings  MUSCULOSKELETAL: No joint pain or swelling  NEUROLOGICAL: As per HPI      OBJECTIVE:    Vital Signs Last 24 Hrs  T(C): 36.4 (02 Oct 2020 07:40), Max: 37.1 (01 Oct 2020 15:15)  T(F): 97.5 (02 Oct 2020 07:40), Max: 98.8 (01 Oct 2020 15:15)  HR: 62 (02 Oct 2020 07:40) (62 - 123)  BP: 123/63 (02 Oct 2020 07:40) (123/63 - 140/78)  BP(mean): 86 (01 Oct 2020 15:15) (86 - 86)  RR: 18 (02 Oct 2020 07:40) (16 - 18)  SpO2: 100% (02 Oct 2020 07:40) (94% - 100%)    General Examination:  General: No acute distress  HEENT: Atraumatic, Normocephalic  Respiratory: CTA B/l.  No crackles, rhonchi, or wheezes.  Cardiovascular: RRR.  Normal S1 & S2.  Normal b/l radial and pedal pulses.    Neurological Examination:  General / Mental Status: AAO x 3.  No aphasia or dysarthria.  Naming and repetition intact.  Cranial Nerves: VFF x 4.  PERRL.  EOMI x 2, No nystagmus or diplopia.  B/l V1-V3 equal and intact to light touch and pinprick.  Symmetric facial movement and palate elevation.  B/l hearing equal to finger rub.  5/5 strength with b/l sternocleidomastoid & trapezius.  Midline tongue protrusion, with no atrophy or fasciculations.  Motor: Normal bulk & tone in all four extremities.  5/5 strength throughout all four extremities.  No downward drift, rigidity, spasticity, or tremors in any of the four extremities.  Sensory: Intact to light touch and pinprick in all four extremities.  Negative Romberg.  Reflex: 2+ and symmetric at b/l biceps, triceps, brachioradialis, patellae, and ankles.  Downgoing toes b/l.  Coordination: No dysmetria with b/l finger-to-nose and heel raise tests.  Symmetric rapid alternating movements b/l.  Gait: Normal, narrow-based gait.  No difficulty with tiptoe, heel, and tandem gaits.        LABORATORY VALUES:                        13.6   15.83 )-----------( 249      ( 02 Oct 2020 07:12 )             41.4       10-02    137  |  105  |  10  ----------------------------<  114<H>  4.0   |  28  |  0.75    Ca    9.3      02 Oct 2020 07:12  Phos  2.5     10-02  Mg     2.3     10-02    TPro  8.2  /  Alb  3.5  /  TBili  0.7  /  DBili  0.1  /  AST  40  /  ALT  67<H>  /  AlkPhos  70  10-02    10-02 Chol 290<H>  HDL 72 Trig 104    Folate, Serum: 8.4 ng/mL (10-02-20 @ 10:20)  Vitamin B12, Serum: 596 pg/mL (10-02-20 @ 10:20)          NEUROIMAGING:          Please contact the Neurology consult service with any questions.    Jay Sims MD   of Neurology  [facility] at [facility] NEUROLOGY CONSULT NOTE    NAME:  SERVANDO FAITH      ASSESSMENT:  45 RHF with persistent bitemporal and posterior headaches, possibly idiopathic intracranial hypertension (IIH), exacerbated by anxiety      RECOMMENDATIONS:    - Start amitriptyline 25mg PO daily for managing chronic daily headache       - Monitor for arrhythmia or other cardiac abnormalities while patient is on amitriptyline (patient's palpitations seem to be temporary and only associated with anxiety attack)       - If this medication is ineffective, we may consider topiramate or zonisamide given the possibility of IIH    - Management of anxiety as per primary team    - Follow up recommendations from Cardiology and Pulmonology recommendations    - Routine follow-up in Neurology clinic for headache management      *******************************      CHIEF COMPLAINT:  Patient is a 45y old  Female who presents with a chief complaint of Sinus Tachycardia (03 Oct 2020 06:20)      HPI:  Patient is a 45 year old female, obese, with PMHx H. Pylori, recently finished triple therapy September is presenting with chief complaint of headache, palpitations, and rash. Patient was laying in bed, watching television when trying to sleep and then developed: headache, palpitations, chest tightness, SOB, rash, nausea, joint pain in the hands and knees, chills and dizziness. This is patient's third episode of the same symptoms. The first time was 2-3 months ago, and the previous episode was 15 days ago. On the previous episode, patient went to [facility] in which she was told it was anxiety and sent home on steroids for the rash, sucralfate for abdominal pain. She also had CT head at that time that revealed a subarachnoid cyst. For the past one year, patient has been dealing with pruitic rashes that started in her hands and have spread to her back, chest, and face. She presented with it this time, but resolved with the medications she took in the ED. Patient also had a recent history of EGD that revealed H. Pylori and patient completed therapy in September 2020. Patient drinks vodka which she endorses to be 2 times a week, each time approximately 3-4 drinks. Patient has had recent history of diarrhea. Denied any fevers, no weakness, no episodes of syncope, no pressure-like chest pain. Patient's last menstrual period was September 1st, and prior to that, she has not had any for 1 year.    In the ED, patient's rash and symptoms resolved with Xanax, Benadryl and IV steroids. However patient had persistent sinus tachycardia and subsequently admitted to tele. Labs unremarkable except for transaminitis that seems to be improving from past. Hemodynamically stable with sinus tachycardia. Patient obtained a CTA Chest that showed no PE, with pulm nodules, prominent hilar lymph node. (01 Oct 2020 16:34)      NEURO HPI:  Interview conducted in [place]. 45 RHF had an episode of chest pain and posterior headache in the setting of an anxiety attack. She had another anxiety attack, also with chest pain, about 2-3 weeks ago. She and her  (at bedside) drink alcohol frequently, but denies binge drinking. She notes that she has been having daily throbbing headache at both temples for at least the past year.    PAST MEDICAL & SURGICAL HISTORY:  Ectopic pregnancy  History of appendectomy    MEDICATIONS:  acetaminophen   Tablet .. 650 milliGRAM(s) Oral every 6 hours PRN  amitriptyline 25 milliGRAM(s) Oral at bedtime  diphenhydrAMINE 25 milliGRAM(s) Oral every 6 hours PRN  enoxaparin Injectable 40 milliGRAM(s) SubCutaneous daily  LORazepam   Injectable 2 milliGRAM(s) IV Push every 4 hours PRN  methylPREDNISolone sodium succinate Injectable 40 milliGRAM(s) IV Push daily  multivitamin/minerals 1 Tablet(s) Oral daily  pantoprazole    Tablet 40 milliGRAM(s) Oral before breakfast  propranolol 10 milliGRAM(s) Oral two times a day    ALLERGIES:  No Known Allergies    FAMILY HISTORY:  Denies family history of headaches    SOCIAL HISTORY:  Social alcohol use; Denies binge drinking (as per HPI)  Denies tobacco and illicit drug use    REVIEW OF SYSTEMS:  GENERAL: No fever, weight changes, fatigue  EYES: No eye pain or discharge  EAR/NOSE/MOUTH/THROAT: No sinus or throat pain  NECK: No pain or stiffness  RESPIRATORY: No cough, wheezing, chills, or hemoptysis  CARDIOVASCULAR: Recent palpitations while anxious; No chest pain, shortness of breath, or dyspnea on exertion  GASTROINTESTINAL: Recent H. pylori infection; No recent abdominal pain, nausea, vomiting, hematemesis, diarrhea, or constipation  GENITOURINARY: No dysuria, frequency, hematuria, or incontinence  SKIN: No rashes or lesions  ENDOCRINE: No heat or cold intolerance  HEMATOLOGIC: No easy bruising or bleeding  PSYCHIATRIC: Recent anxiety  MUSCULOSKELETAL: No joint pain or swelling  NEUROLOGICAL: As per HPI      OBJECTIVE:    Vital Signs Last 24 Hrs  T(C): 36.4 (02 Oct 2020 07:40), Max: 37.1 (01 Oct 2020 15:15)  T(F): 97.5 (02 Oct 2020 07:40), Max: 98.8 (01 Oct 2020 15:15)  HR: 62 (02 Oct 2020 07:40) (62 - 123)  BP: 123/63 (02 Oct 2020 07:40) (123/63 - 140/78)  BP(mean): 86 (01 Oct 2020 15:15) (86 - 86)  RR: 18 (02 Oct 2020 07:40) (16 - 18)  SpO2: 100% (02 Oct 2020 07:40) (94% - 100%)    General Examination:  General: No acute distress  HEENT: Atraumatic, Normocephalic  Respiratory: CTA B/l.  No crackles, rhonchi, or wheezes.  Cardiovascular: RRR.  Normal S1 & S2.  Normal b/l radial and pedal pulses.    Neurological Examination:  General / Mental Status: AAO x 3.  No aphasia or dysarthria.  Naming and repetition intact.  Cranial Nerves: VFF x 4.  PERRL.  EOMI x 2, No nystagmus or diplopia.  B/l V1-V3 equal and intact to light touch and pinprick.  Symmetric facial movement and palate elevation.  B/l hearing equal to finger rub.  5/5 strength with b/l sternocleidomastoid & trapezius.  Midline tongue protrusion, with no atrophy or fasciculations.  Motor: Normal bulk & tone in all four extremities.  5/5 strength throughout all four extremities.  No downward drift, rigidity, spasticity, or tremors in any of the four extremities.  Sensory: Intact to light touch and pinprick in all four extremities.  Reflex: 2+ and symmetric at b/l biceps, triceps, brachioradialis, patellae, and ankles.  Downgoing toes b/l.  Coordination: No dysmetria with b/l finger-to-nose and heel raise tests.  Symmetric rapid alternating movements b/l.  Gait and Romberg testing deferred per patient request.        LABORATORY VALUES:                        13.6   15.83 )-----------( 249      ( 02 Oct 2020 07:12 )             41.4       10-02    137  |  105  |  10  ----------------------------<  114<H>  4.0   |  28  |  0.75    Ca    9.3      02 Oct 2020 07:12  Phos  2.5     10-02  Mg     2.3     10-02    TPro  8.2  /  Alb  3.5  /  TBili  0.7  /  DBili  0.1  /  AST  40  /  ALT  67<H>  /  AlkPhos  70  10-02    10-02 Chol 290<H>  HDL 72 Trig 104    Folate, Serum: 8.4 ng/mL (10-02-20 @ 10:20)  Vitamin B12, Serum: 596 pg/mL (10-02-20 @ 10:20)        NEUROIMAGING:    CT Head (10/2/20):  - No acute intracranial abnormality  - Left middle cranial fossa 3.0 cm x 2.3 cm arachnoid cyst  - B/l atrophic optic nerves  - Partial empty sella        Please contact the Neurology consult service with any questions.    Jay Sims MD   of Neurology  [facility] at [facility]

## 2020-10-02 NOTE — PROGRESS NOTE ADULT - PROBLEM SELECTOR PLAN 1
With persistent sinus tachycardia, even when acute episodes of various symptoms are controlled.  PE was ruled on with CTA chest.  -Started on propanolol 10mg BID for sinus tachy, questionable due to anxiety vs underlying endocrine pathology.  - Monitor on tele, highly doubt cardiac cause of symptoms With persistent sinus tachycardia, even when acute episodes of various symptoms are controlled.  PE was ruled on with CTA chest.  -now on propanolol 10mg BID for sinus tachy, questionable due to anxiety vs underlying endocrine pathology.  - Monitor on tele, highly doubt cardiac cause of symptoms

## 2020-10-02 NOTE — PROGRESS NOTE ADULT - PROBLEM SELECTOR PLAN 2
With myriad of symptoms, may be anxiety, however should rule out endocrinological pathology.  - Started on propanolol  - Patient appears to be perimenopausal: will obtain TSH, FSH, estrogen levels.  - Rule out pheo, given symptoms of migraines, palpitations, anxiety, however less likely given slightly elevated BP readings.  - Rule out carcinoid, random 5-HIAA urine ordered. CT abdomen ordered  - Rule out thyroid abnormalities with TSH, free T4 With myriad of symptoms, may be anxiety, however should rule out endocrinological pathology.  -  on propanolol  - Patient appears to be perimenopausal: will obtain TSH, FSH, estrogen levels.  - Rule out carcinoid, random 5-HIAA urine ordered.   - TSH, free T4 normal

## 2020-10-02 NOTE — PROGRESS NOTE ADULT - SUBJECTIVE AND OBJECTIVE BOX
Patient is a 45y old  Female who presents with a chief complaint of Sinus Tachycardia (01 Oct 2020 16:34)    pt seen in icu [  ], reg med floor [   ], bed [  ], chair at bedside [   ], a+o x3 [  ], lethargic [  ],  nad [  ]    castillo [  ], ngt [  ], peg [  ], et tube [  ], cent line [  ], picc line [  ]        Allergies    No Known Allergies        Vitals    T(F): 98 (10-02-20 @ 04:51), Max: 99.5 (10-01-20 @ 06:39)  HR: 71 (10-02-20 @ 04:51) (71 - 134)  BP: 135/67 (10-02-20 @ 04:51) (126/79 - 145/82)  RR: 18 (10-02-20 @ 04:51) (16 - 18)  SpO2: 99% (10-02-20 @ 04:51) (94% - 99%)  Wt(kg): --  CAPILLARY BLOOD GLUCOSE          Labs                          13.7   9.47  )-----------( 247      ( 01 Oct 2020 07:02 )             41.7       10-01    139  |  105  |  19<H>  ----------------------------<  119<H>  4.2   |  26  |  0.74    Ca    9.2      01 Oct 2020 07:02    TPro  8.2  /  Alb  3.7  /  TBili  0.2  /  DBili  x   /  AST  71<H>  /  ALT  87<H>  /  AlkPhos  82  10-01      CARDIAC MARKERS ( 01 Oct 2020 07:02 )  <0.015 ng/mL / x     / 104 U/L / x     / x                Radiology Results      Meds    MEDICATIONS  (STANDING):  enoxaparin Injectable 40 milliGRAM(s) SubCutaneous daily  multivitamin/minerals 1 Tablet(s) Oral daily  pantoprazole    Tablet 40 milliGRAM(s) Oral before breakfast  propranolol 10 milliGRAM(s) Oral two times a day      MEDICATIONS  (PRN):  diphenhydrAMINE 25 milliGRAM(s) Oral every 6 hours PRN Rash and/or Itching  LORazepam   Injectable 2 milliGRAM(s) IV Push every 4 hours PRN CIWA>7      Physical Exam    Neuro :  no focal deficits  Respiratory: CTA B/L  CV: RRR, S1S2, no murmurs,   Abdominal: Soft, NT, ND +BS,  Extremities: No edema, + peripheral pulses    ASSESSMENT    Tachycardia    Ectopic pregnancy    No pertinent past medical history    No pertinent past medical history    History of appendectomy    No significant past surgical history        PLAN     Patient is a 45y old  Female who presents with a chief complaint of Sinus Tachycardia (01 Oct 2020 16:34)    pt seen in tele [ x ], reg med floor [   ], bed [ x ], chair at bedside [   ], a+o x3 [x  ], lethargic [  ],  nad [x  ]      Allergies    No Known Allergies        Vitals    T(F): 98 (10-02-20 @ 04:51), Max: 99.5 (10-01-20 @ 06:39)  HR: 71 (10-02-20 @ 04:51) (71 - 134)  BP: 135/67 (10-02-20 @ 04:51) (126/79 - 145/82)  RR: 18 (10-02-20 @ 04:51) (16 - 18)  SpO2: 99% (10-02-20 @ 04:51) (94% - 99%)  Wt(kg): --  CAPILLARY BLOOD GLUCOSE          Labs                          13.7   9.47  )-----------( 247      ( 01 Oct 2020 07:02 )             41.7       10-01    139  |  105  |  19<H>  ----------------------------<  119<H>  4.2   |  26  |  0.74    Ca    9.2      01 Oct 2020 07:02    TPro  8.2  /  Alb  3.7  /  TBili  0.2  /  DBili  x   /  AST  71<H>  /  ALT  87<H>  /  AlkPhos  82  10-01      CARDIAC MARKERS ( 01 Oct 2020 07:02 )  <0.015 ng/mL / x     / 104 U/L / x     / x                Radiology Results      Meds    MEDICATIONS  (STANDING):  enoxaparin Injectable 40 milliGRAM(s) SubCutaneous daily  multivitamin/minerals 1 Tablet(s) Oral daily  pantoprazole    Tablet 40 milliGRAM(s) Oral before breakfast  propranolol 10 milliGRAM(s) Oral two times a day      MEDICATIONS  (PRN):  diphenhydrAMINE 25 milliGRAM(s) Oral every 6 hours PRN Rash and/or Itching  LORazepam   Injectable 2 milliGRAM(s) IV Push every 4 hours PRN CIWA>7      Physical Exam    Neuro :  no focal deficits  Respiratory: CTA B/L  CV: RRR, S1S2, no murmurs,   Abdominal: Soft, NT, ND +BS,  Extremities: No edema, + peripheral pulses  Skin : mild maculopapular rash on extremities and chest        ASSESSMENT    Tachycardia  possible 2nd to panic attack,    r/o acs,   lung nodule,   headache   h/o allergic rash,  anxiety,   Ectopic pregnancy  History of appendectomy      PLAN      con tele,   acs protocol,   cont propanol 10mg bid,   hr improved  cont aspirin, statin,   ce x1 neg noted above   cardio cons  f/u echo  pulm cons  f/u  quantiferon tb gold,   f/u paul, rf, anti dsdna,   ct head  ct abd pelv   cont benadryl prn pruritis  cont current meds

## 2020-10-02 NOTE — PROGRESS NOTE ADULT - PROBLEM SELECTOR PLAN 4
Hx of gastritis, found with H. Pylori, s/p ?triple therapy.  - Continue with protonix Hx of gastritis, found with H. Pylori, s/p ?triple therapy.  - on protonix

## 2020-10-02 NOTE — PHYSICAL THERAPY INITIAL EVALUATION ADULT - GENERAL OBSERVATIONS, REHAB EVAL
Pt. received supine in bed, NAD, on telemetry. Pt. cooperative and motivated during eval. Pt. speaks English only; offered  phone but she preferred her  to interpret. Pt. A&O x 3.

## 2020-10-02 NOTE — PROGRESS NOTE ADULT - SUBJECTIVE AND OBJECTIVE BOX
PGY-1 Progress Note discussed with attending    PAGER #: [[phone]] TILL 5:00 PM  PLEASE CONTACT ON CALL TEAM:  - On Call Team (Please refer to Anthony) FROM 5:00 PM - 8:30PM  - Nightfloat Team FROM 8:30 -7:30 AM    CHIEF COMPLAINT & BRIEF HOSPITAL COURSE:    INTERVAL HPI/OVERNIGHT EVENTS:       REVIEW OF SYSTEMS:  CONSTITUTIONAL: No fever, weight loss, or fatigue  RESPIRATORY: No cough, wheezing, chills or hemoptysis; No shortness of breath  CARDIOVASCULAR: No chest pain, palpitations, dizziness, or leg swelling  GASTROINTESTINAL: No abdominal pain. No nausea, vomiting, or hematemesis; No diarrhea or constipation. No melena or hematochezia.  GENITOURINARY: No dysuria or hematuria, urinary frequency  NEUROLOGICAL: No headaches, memory loss, loss of strength, numbness, or tremors  SKIN: No itching, burning, rashes, or lesions     MEDICATIONS  (STANDING):  enoxaparin Injectable 40 milliGRAM(s) SubCutaneous daily  multivitamin/minerals 1 Tablet(s) Oral daily  pantoprazole    Tablet 40 milliGRAM(s) Oral before breakfast  propranolol 10 milliGRAM(s) Oral two times a day    MEDICATIONS  (PRN):  acetaminophen   Tablet .. 650 milliGRAM(s) Oral every 6 hours PRN Moderate Pain (4 - 6)  diphenhydrAMINE 25 milliGRAM(s) Oral every 6 hours PRN Rash and/or Itching  LORazepam   Injectable 2 milliGRAM(s) IV Push every 4 hours PRN CIWA>7      Vital Signs Last 24 Hrs  T(C): 36.4 (02 Oct 2020 07:40), Max: 37.1 (01 Oct 2020 15:15)  T(F): 97.5 (02 Oct 2020 07:40), Max: 98.8 (01 Oct 2020 15:15)  HR: 62 (02 Oct 2020 07:40) (62 - 123)  BP: 123/63 (02 Oct 2020 07:40) (123/63 - 140/78)  BP(mean): 86 (01 Oct 2020 15:15) (86 - 86)  RR: 18 (02 Oct 2020 07:40) (16 - 18)  SpO2: 100% (02 Oct 2020 07:40) (94% - 100%)    PHYSICAL EXAMINATION:  GENERAL: NAD, well built  HEAD:  Atraumatic, Normocephalic  EYES:  conjunctiva and sclera clear  NECK: Supple, No JVD, Normal thyroid  CHEST/LUNG: Clear to auscultation. Clear to percussion bilaterally; No rales, rhonchi, wheezing, or rubs  HEART: Regular rate and rhythm; No murmurs, rubs, or gallops  ABDOMEN: Soft, Nontender, Nondistended; Bowel sounds present  NERVOUS SYSTEM:  Alert & Oriented X3,    EXTREMITIES:  2+ Peripheral Pulses, No clubbing, cyanosis, or edema  SKIN: warm dry                          13.6   15.83 )-----------( 249      ( 02 Oct 2020 07:12 )             41.4     10-02    137  |  105  |  10  ----------------------------<  114<H>  4.0   |  28  |  0.75    Ca    9.3      02 Oct 2020 07:12  Phos  2.5     10-02  Mg     2.3     10-02    TPro  8.2  /  Alb  3.5  /  TBili  0.7  /  DBili  0.1  /  AST  40  /  ALT  67<H>  /  AlkPhos  70  10-02    LIVER FUNCTIONS - ( 02 Oct 2020 07:12 )  Alb: 3.5 g/dL / Pro: 8.2 g/dL / ALK PHOS: 70 U/L / ALT: 67 U/L DA / AST: 40 U/L / GGT: x           CARDIAC MARKERS ( 02 Oct 2020 07:12 )  <0.015 ng/mL / x     / x     / x     / x      CARDIAC MARKERS ( 01 Oct 2020 07:02 )  <0.015 ng/mL / x     / 104 U/L / x     / x          PT/INR - ( 01 Oct 2020 07:02 )   PT: 11.0 sec;   INR: 0.94 ratio         PTT - ( 01 Oct 2020 07:02 )  PTT:34.7 sec        CAPILLARY BLOOD GLUCOSE  CAPILLARY BLOOD GLUCOSE        CAPILLARY BLOOD GLUCOSE          RADIOLOGY & ADDITIONAL TESTS:                   PGY-1 Progress Note discussed with attending    PAGER #: [[phone]] TILL 5:00 PM  PLEASE CONTACT ON CALL TEAM:  - On Call Team (Please refer to Anthony) FROM 5:00 PM - 8:30PM  - Nightfloat Team FROM 8:30 -7:30 AM    CHIEF COMPLAINT & BRIEF HOSPITAL COURSE:    Patient is a 45 year old female, obese, with PMHx H. Pylori, recently finished triple therapy September is presenting with chief complaint of headache, palpitations, and rash. Patient was laying in bed, watching television when trying to sleep and then developed: headache, palpitations, chest tightness, SOB, rash, nausea, joint pain in the hands and knees, chills and dizziness. This is patient's third episode of the same symptoms. The first time was 2-3 months ago, and the previous episode was 15 days ago. On the previous episode, patient went to [facility] in which she was told it was anxiety and sent home on steroids for the rash, sucralfate for abdominal pain. She also had CT head at that time that revealed a subarachnoid cyst. For the past one year, patient has been dealing with pruitic rashes that started in her hands and have spread to her back, chest, and face. She presented with it this time, but resolved with the medications she took in the ED. Patient also had a recent history of EGD that revealed H. Pylori and patient completed therapy in September 2020. Patient drinks vodka which she endorses to be 2 times a week, each time approximately 3-4 drinks. Patient has had recent history of diarrhea. Denied any fevers, no weakness, no episodes of syncope, no pressure-like chest pain. Patient's last menstrual period was September 1st, and prior to that, she has not had any for 1 year.    In the ED, patient's rash and symptoms resolved with Xanax, Benadryl and IV steroids. However patient had persistent sinus tachycardia and subsequently admitted to tele. Labs unremarkable except for transaminitis that seems to be improving from past. Hemodynamically stable with sinus tachycardia. Patient obtained a CTA Chest that showed no PE, with pulm nodules, prominent hilar lymph node.       INTERVAL HPI/OVERNIGHT EVENTS:   patient examined bed side. she still c/o of arm rash but other symptoms improved.      REVIEW OF SYSTEMS:  CONSTITUTIONAL: No fever, weight loss, or fatigue  RESPIRATORY: No cough, wheezing, chills or hemoptysis; No shortness of breath  CARDIOVASCULAR: No chest pain, palpitations, dizziness, or leg swelling  GASTROINTESTINAL: No abdominal pain. No nausea, vomiting, or hematemesis; No diarrhea or constipation. No melena or hematochezia.  GENITOURINARY: No dysuria or hematuria, urinary frequency  NEUROLOGICAL: No , memory loss, loss of strength, numbness, or tremors. c/o headache  SKIN: c/o itching and rash     MEDICATIONS  (STANDING):  enoxaparin Injectable 40 milliGRAM(s) SubCutaneous daily  multivitamin/minerals 1 Tablet(s) Oral daily  pantoprazole    Tablet 40 milliGRAM(s) Oral before breakfast  propranolol 10 milliGRAM(s) Oral two times a day    MEDICATIONS  (PRN):  acetaminophen   Tablet .. 650 milliGRAM(s) Oral every 6 hours PRN Moderate Pain (4 - 6)  diphenhydrAMINE 25 milliGRAM(s) Oral every 6 hours PRN Rash and/or Itching  LORazepam   Injectable 2 milliGRAM(s) IV Push every 4 hours PRN CIWA>7      Vital Signs Last 24 Hrs  T(C): 36.4 (02 Oct 2020 07:40), Max: 37.1 (01 Oct 2020 15:15)  T(F): 97.5 (02 Oct 2020 07:40), Max: 98.8 (01 Oct 2020 15:15)  HR: 62 (02 Oct 2020 07:40) (62 - 123)  BP: 123/63 (02 Oct 2020 07:40) (123/63 - 140/78)  BP(mean): 86 (01 Oct 2020 15:15) (86 - 86)  RR: 18 (02 Oct 2020 07:40) (16 - 18)  SpO2: 100% (02 Oct 2020 07:40) (94% - 100%)    PHYSICAL EXAMINATION:  GENERAL: NAD, well built  HEAD:  Atraumatic, Normocephalic  EYES:  conjunctiva and sclera clear  NECK: Supple, No JVD, Normal thyroid  CHEST/LUNG: Clear to auscultation. Clear to percussion bilaterally; No rales, rhonchi, wheezing, or rubs  HEART: Regular rate and rhythm; No murmurs, rubs, or gallops  ABDOMEN: Soft, Nontender, Nondistended; Bowel sounds present  NERVOUS SYSTEM:  Alert & Oriented X3,    EXTREMITIES:  2+ Peripheral Pulses, No clubbing, cyanosis, or edema  SKIN:rash on the right arm                           13.6   15.83 )-----------( 249      ( 02 Oct 2020 07:12 )             41.4     10-02    137  |  105  |  10  ----------------------------<  114<H>  4.0   |  28  |  0.75    Ca    9.3      02 Oct 2020 07:12  Phos  2.5     10-02  Mg     2.3     10-02    TPro  8.2  /  Alb  3.5  /  TBili  0.7  /  DBili  0.1  /  AST  40  /  ALT  67<H>  /  AlkPhos  70  10-02    LIVER FUNCTIONS - ( 02 Oct 2020 07:12 )  Alb: 3.5 g/dL / Pro: 8.2 g/dL / ALK PHOS: 70 U/L / ALT: 67 U/L DA / AST: 40 U/L / GGT: x           CARDIAC MARKERS ( 02 Oct 2020 07:12 )  <0.015 ng/mL / x     / x     / x     / x      CARDIAC MARKERS ( 01 Oct 2020 07:02 )  <0.015 ng/mL / x     / 104 U/L / x     / x          PT/INR - ( 01 Oct 2020 07:02 )   PT: 11.0 sec;   INR: 0.94 ratio         PTT - ( 01 Oct 2020 07:02 )  PTT:34.7 sec        CAPILLARY BLOOD GLUCOSE  CAPILLARY BLOOD GLUCOSE        CAPILLARY BLOOD GLUCOSE          RADIOLOGY & ADDITIONAL TESTS:

## 2020-10-02 NOTE — CONSULT NOTE ADULT - ASSESSMENT
1. SOB  R/O Underlying bronchospasm  CT chest noted.        No large central PE.   Covid negative.   Bronchodilators  O2 Supp PRN  PFTs as OP.     2. Lung Nodule  6mm RLL   Former smoker.   Quantiferon TB gold  F/U CT chest as OP in 6mo to assess stability.    3. Headache   CT head noted.   Neuro eval  May need MRI     4. Palpitations  Tele monitoring   Echo  Cardio F/U  Continue meds    5. Rash  R/O medication reaction  Continue steroids   Benadryl PRN for pruritis.

## 2020-10-02 NOTE — PROGRESS NOTE ADULT - PROBLEM SELECTOR PLAN 8
Patient with subarachnoid cyst on previous hospital visit  - Repeat CT head Patient with subarachnoid cyst on previous hospital visit  - Repeat CT head stable

## 2020-10-02 NOTE — CONSULT NOTE ADULT - ATTENDING COMMENTS
I counseled the patient about the differential diagnosis for her symptoms, and the medication we would start to help manage her headaches.

## 2020-10-02 NOTE — CONSULT NOTE ADULT - SUBJECTIVE AND OBJECTIVE BOX
PULMONARY CONSULT NOTE      SERVANDO FAITH  MRN-[id number]    Patient is a 45y old  Female who presents with a chief complaint of Sinus Tachycardia (02 Oct 2020 10:49)      HISTORY OF PRESENT ILLNESS:  History of Present Illness:  Reason for Admission: Sinus Tachycardia  History of Present Illness:   Patient is a 45 year old female, obese, with PMHx H. Pylori, recently finished triple therapy September is presenting with chief complaint of headache, palpitations, and rash. Patient was laying in bed, watching television when trying to sleep and then developed: headache, palpitations, chest tightness, SOB, rash, nausea, joint pain in the hands and knees, chills and dizziness. This is patient's third episode of the same symptoms. The first time was 2-3 months ago, and the previous episode was 15 days ago. On the previous episode, patient went to [facility] in which she was told it was anxiety and sent home on steroids for the rash, sucralfate for abdominal pain. She also had CT head at that time that revealed a subarachnoid cyst. For the past one year, patient has been dealing with pruitic rashes that started in her hands and have spread to her back, chest, and face. She presented with it this time, but resolved with the medications she took in the ED. Patient also had a recent history of EGD that revealed H. Pylori and patient completed therapy in September 2020. Patient drinks vodka which she endorses to be 2 times a week, each time approximately 3-4 drinks. Patient has had recent history of diarrhea. Denied any fevers, no weakness, no episodes of syncope, no pressure-like chest pain. Patient's last menstrual period was September 1st, and prior to that, she has not had any for 1 year.    In the ED, patient's rash and symptoms resolved with Xanax, Benadryl and IV steroids. However patient had persistent sinus tachycardia and subsequently admitted to [facility]. Labs unremarkable except for transaminitis that seems to be improving from past. Hemodynamically stable with sinus tachycardia. Patient obtained a CTA Chest that showed no PE, with pulm nodules, prominent hilar lymph node.    Pt is awake, alert, lying in bed in NAD. Denies cough/SOB at present. Denies CP at present. Reports she felt SOB, had palpitations and dizziness. Not presently c/o above symptoms. Denies hx of asthma/COPD. Former smoker. Pt also reports rash - pruritic to arms, face, chest.     MEDICATIONS  (STANDING):  enoxaparin Injectable 40 milliGRAM(s) SubCutaneous daily  ergocalciferol 75798 Unit(s) Oral once  methylPREDNISolone sodium succinate Injectable 40 milliGRAM(s) IV Push once  multivitamin/minerals 1 Tablet(s) Oral daily  pantoprazole    Tablet 40 milliGRAM(s) Oral before breakfast  propranolol 10 milliGRAM(s) Oral two times a day      MEDICATIONS  (PRN):  acetaminophen   Tablet .. 650 milliGRAM(s) Oral every 6 hours PRN Moderate Pain (4 - 6)  diphenhydrAMINE 25 milliGRAM(s) Oral every 6 hours PRN Rash and/or Itching  LORazepam   Injectable 2 milliGRAM(s) IV Push every 4 hours PRN CIWA>7      Allergies    No Known Allergies    Intolerances        PAST MEDICAL & SURGICAL HISTORY:  Ectopic pregnancy    History of appendectomy        FAMILY HISTORY:      SOCIAL HISTORY  Smoking History:     REVIEW OF SYSTEMS:    CONSTITUTIONAL:  No fevers, chills, sweats    HEENT:  Eyes:  No diplopia or blurred vision. ENT:  No earache, sore throat or runny nose.    CARDIOVASCULAR:  No pressure, squeezing, tightness, or heaviness about the chest; no palpitations.    RESPIRATORY:  Per HPI    GASTROINTESTINAL:  No abdominal pain, nausea, vomiting or diarrhea.    GENITOURINARY:  No dysuria, frequency or urgency.    NEUROLOGIC:  No paresthesias, fasciculations, seizures or weakness.    PSYCHIATRIC:  No disorder of thought or mood.    Vital Signs Last 24 Hrs  T(C): 36.4 (02 Oct 2020 07:40), Max: 37.1 (01 Oct 2020 15:15)  T(F): 97.5 (02 Oct 2020 07:40), Max: 98.8 (01 Oct 2020 15:15)  HR: 62 (02 Oct 2020 07:40) (62 - 123)  BP: 123/63 (02 Oct 2020 07:40) (123/63 - 135/67)  BP(mean): 86 (01 Oct 2020 15:15) (86 - 86)  RR: 18 (02 Oct 2020 07:40) (16 - 18)  SpO2: 100% (02 Oct 2020 07:40) (98% - 100%)  I&O's Detail      PHYSICAL EXAMINATION:    GENERAL: The patient is a well-developed, well-nourished _____in no apparent distress.     HEENT: Head is normocephalic and atraumatic. Extraocular muscles are intact. Mucous membranes are moist.     NECK: Supple.     LUNGS: Clear to auscultation without wheezing, rales, or rhonchi. Respirations unlabored    HEART: Regular rate and rhythm without murmur.    ABDOMEN: Soft, nontender, and nondistended.  No hepatosplenomegaly is noted.    EXTREMITIES: Without any cyanosis, clubbing, rash, lesions or edema.    NEUROLOGIC: Grossly intact.      LABS:                        13.6   15.83 )-----------( 249      ( 02 Oct 2020 07:12 )             41.4     10-02    137  |  105  |  10  ----------------------------<  114<H>  4.0   |  28  |  0.75    Ca    9.3      02 Oct 2020 07:12  Phos  2.5     10-02  Mg     2.3     10-02    TPro  8.2  /  Alb  3.5  /  TBili  0.7  /  DBili  0.1  /  AST  40  /  ALT  67<H>  /  AlkPhos  70  10-02    PT/INR - ( 01 Oct 2020 07:02 )   PT: 11.0 sec;   INR: 0.94 ratio         PTT - ( 01 Oct 2020 07:02 )  PTT:34.7 sec      CARDIAC MARKERS ( 02 Oct 2020 07:12 )  <0.015 ng/mL / x     / x     / x     / x      CARDIAC MARKERS ( 01 Oct 2020 07:02 )  <0.015 ng/mL / x     / 104 U/L / x     / x            COVID-19 IgG Antibody Interpretation: Negative: This test has not been reviewed by the FDA by the standard review     COVID-19 PCR: NotDetec: Test performed on [employer] ID -Now     MICROBIOLOGY:    RADIOLOGY & ADDITIONAL STUDIES:    CXR:    Ct scan chest:  < from: CT Head No Cont (10.02.20 @ 10:21) >  IMPRESSION:  No acute intracranial hemorrhage or acute territorial infarct.  If symptoms persist, follow-up MRI exam recommended.    < end of copied text >    < from: CT Angio Chest w/ IV Cont (10.01.20 @ 12:03) >    IMPRESSION:  No large central pulmonary emboli. Evaluation of the segmental and subsegmental branches is limited as above.    Enlarged right hilar lymph node and prominent left hilar lymph node, nonspecific.    6 mm nodule superior segment right lower lobe. Recommend 6 month follow-up to ensure stability or resolution.        < end of copied text >      ekg;    echo:

## 2020-10-03 ENCOUNTER — TRANSCRIPTION ENCOUNTER (OUTPATIENT)
Age: 45
End: 2020-10-03

## 2020-10-03 LAB
ALBUMIN SERPL ELPH-MCNC: 3.2 G/DL — LOW (ref 3.5–5)
ALP SERPL-CCNC: 58 U/L — SIGNIFICANT CHANGE UP (ref 40–120)
ALT FLD-CCNC: 57 U/L DA — SIGNIFICANT CHANGE UP (ref 10–60)
ANION GAP SERPL CALC-SCNC: 6 MMOL/L — SIGNIFICANT CHANGE UP (ref 5–17)
AST SERPL-CCNC: 37 U/L — SIGNIFICANT CHANGE UP (ref 10–40)
BILIRUB SERPL-MCNC: 0.3 MG/DL — SIGNIFICANT CHANGE UP (ref 0.2–1.2)
BUN SERPL-MCNC: 12 MG/DL — SIGNIFICANT CHANGE UP (ref 7–18)
CALCIUM SERPL-MCNC: 9 MG/DL — SIGNIFICANT CHANGE UP (ref 8.4–10.5)
CHLORIDE SERPL-SCNC: 107 MMOL/L — SIGNIFICANT CHANGE UP (ref 96–108)
CO2 SERPL-SCNC: 28 MMOL/L — SIGNIFICANT CHANGE UP (ref 22–31)
CREAT SERPL-MCNC: 0.65 MG/DL — SIGNIFICANT CHANGE UP (ref 0.5–1.3)
GLUCOSE SERPL-MCNC: 103 MG/DL — HIGH (ref 70–99)
HCT VFR BLD CALC: 38.9 % — SIGNIFICANT CHANGE UP (ref 34.5–45)
HGB BLD-MCNC: 13 G/DL — SIGNIFICANT CHANGE UP (ref 11.5–15.5)
MAGNESIUM SERPL-MCNC: 2.4 MG/DL — SIGNIFICANT CHANGE UP (ref 1.6–2.6)
MCHC RBC-ENTMCNC: 32 PG — SIGNIFICANT CHANGE UP (ref 27–34)
MCHC RBC-ENTMCNC: 33.4 GM/DL — SIGNIFICANT CHANGE UP (ref 32–36)
MCV RBC AUTO: 95.8 FL — SIGNIFICANT CHANGE UP (ref 80–100)
NRBC # BLD: 0 /100 WBCS — SIGNIFICANT CHANGE UP (ref 0–0)
PHOSPHATE SERPL-MCNC: 3.4 MG/DL — SIGNIFICANT CHANGE UP (ref 2.5–4.5)
PLATELET # BLD AUTO: 213 K/UL — SIGNIFICANT CHANGE UP (ref 150–400)
POTASSIUM SERPL-MCNC: 3.9 MMOL/L — SIGNIFICANT CHANGE UP (ref 3.5–5.3)
POTASSIUM SERPL-SCNC: 3.9 MMOL/L — SIGNIFICANT CHANGE UP (ref 3.5–5.3)
PROT SERPL-MCNC: 7.2 G/DL — SIGNIFICANT CHANGE UP (ref 6–8.3)
RBC # BLD: 4.06 M/UL — SIGNIFICANT CHANGE UP (ref 3.8–5.2)
RBC # FLD: 13.9 % — SIGNIFICANT CHANGE UP (ref 10.3–14.5)
SODIUM SERPL-SCNC: 141 MMOL/L — SIGNIFICANT CHANGE UP (ref 135–145)
T PALLIDUM AB TITR SER: NEGATIVE — SIGNIFICANT CHANGE UP
WBC # BLD: 12.93 K/UL — HIGH (ref 3.8–10.5)
WBC # FLD AUTO: 12.93 K/UL — HIGH (ref 3.8–10.5)

## 2020-10-03 PROCEDURE — 99233 SBSQ HOSP IP/OBS HIGH 50: CPT

## 2020-10-03 PROCEDURE — 93306 TTE W/DOPPLER COMPLETE: CPT | Mod: 26

## 2020-10-03 RX ORDER — ERGOCALCIFEROL 1.25 MG/1
50000 CAPSULE ORAL
Refills: 0 | Status: DISCONTINUED | OUTPATIENT
Start: 2020-10-03 | End: 2020-10-04

## 2020-10-03 RX ORDER — AMITRIPTYLINE HCL 25 MG
1 TABLET ORAL
Qty: 0 | Refills: 0 | DISCHARGE
Start: 2020-10-03

## 2020-10-03 RX ORDER — PROPRANOLOL HCL 160 MG
1 CAPSULE, EXTENDED RELEASE 24HR ORAL
Qty: 0 | Refills: 0 | DISCHARGE
Start: 2020-10-03

## 2020-10-03 RX ORDER — MULTIVIT-MIN/FERROUS GLUCONATE 9 MG/15 ML
1 LIQUID (ML) ORAL
Qty: 0 | Refills: 0 | DISCHARGE
Start: 2020-10-03

## 2020-10-03 RX ADMIN — Medication 650 MILLIGRAM(S): at 11:42

## 2020-10-03 RX ADMIN — Medication 650 MILLIGRAM(S): at 12:40

## 2020-10-03 RX ADMIN — Medication 25 MILLIGRAM(S): at 00:17

## 2020-10-03 RX ADMIN — Medication 25 MILLIGRAM(S): at 21:26

## 2020-10-03 RX ADMIN — Medication 40 MILLIGRAM(S): at 05:27

## 2020-10-03 RX ADMIN — PANTOPRAZOLE SODIUM 40 MILLIGRAM(S): 20 TABLET, DELAYED RELEASE ORAL at 05:27

## 2020-10-03 RX ADMIN — ERGOCALCIFEROL 50000 UNIT(S): 1.25 CAPSULE ORAL at 11:35

## 2020-10-03 RX ADMIN — ENOXAPARIN SODIUM 40 MILLIGRAM(S): 100 INJECTION SUBCUTANEOUS at 11:35

## 2020-10-03 RX ADMIN — Medication 1 TABLET(S): at 11:35

## 2020-10-03 NOTE — PROGRESS NOTE ADULT - SUBJECTIVE AND OBJECTIVE BOX
NEUROLOGY FOLLOW-UP NOTE    NAME:  SERVANDO FAITH      ASSESSMENT:  45 RHF with persistent bitemporal and posterior headaches, exacerbated by anxiety, improved with amitriptyline      RECOMMENDATIONS:    - Continue amitriptyline 25mg PO daily at bedtime for managing chronic daily headache       - Monitor for arrhythmia or other cardiac abnormalities while patient is on amitriptyline    - Management of anxiety as per primary team    - Routine follow-up in Neurology clinic for headache management          NOTE TO BE COMPLETED - PLEASE REFER TO ABOVE ONLY AND IGNORE INFORMATION BELOW    ******************************    HPI:  Patient is a 45 year old female, obese, with PMHx H. Pylori, recently finished triple therapy September is presenting with chief complaint of headache, palpitations, and rash. Patient was laying in bed, watching television when trying to sleep and then developed: headache, palpitations, chest tightness, SOB, rash, nausea, joint pain in the hands and knees, chills and dizziness. This is patient's third episode of the same symptoms. The first time was 2-3 months ago, and the previous episode was 15 days ago. On the previous episode, patient went to [facility] in which she was told it was anxiety and sent home on steroids for the rash, sucralfate for abdominal pain. She also had CT head at that time that revealed a subarachnoid cyst. For the past one year, patient has been dealing with pruitic rashes that started in her hands and have spread to her back, chest, and face. She presented with it this time, but resolved with the medications she took in the ED. Patient also had a recent history of EGD that revealed H. Pylori and patient completed therapy in September 2020. Patient drinks vodka which she endorses to be 2 times a week, each time approximately 3-4 drinks. Patient has had recent history of diarrhea. Denied any fevers, no weakness, no episodes of syncope, no pressure-like chest pain. Patient's last menstrual period was September 1st, and prior to that, she has not had any for 1 year.    In the ED, patient's rash and symptoms resolved with Xanax, Benadryl and IV steroids. However patient had persistent sinus tachycardia and subsequently admitted to tele. Labs unremarkable except for transaminitis that seems to be improving from past. Hemodynamically stable with sinus tachycardia. Patient obtained a CTA Chest that showed no PE, with pulm nodules, prominent hilar lymph node. (01 Oct 2020 16:34)      NEURO HPI:      INTERVAL HISTORY:      MEDICATIONS:  acetaminophen   Tablet .. 650 milliGRAM(s) Oral every 6 hours PRN  amitriptyline 25 milliGRAM(s) Oral at bedtime  diphenhydrAMINE 25 milliGRAM(s) Oral every 6 hours PRN  enoxaparin Injectable 40 milliGRAM(s) SubCutaneous daily  ergocalciferol 46456 Unit(s) Oral <User Schedule>  LORazepam   Injectable 2 milliGRAM(s) IV Push every 4 hours PRN  methylPREDNISolone sodium succinate Injectable 40 milliGRAM(s) IV Push daily  multivitamin/minerals 1 Tablet(s) Oral daily  pantoprazole    Tablet 40 milliGRAM(s) Oral before breakfast  propranolol 10 milliGRAM(s) Oral two times a day      ALLERGIES:  No Known Allergies      REVIEW OF SYSTEMS:  Fourteen systems reviewed and negative except as in HPI / Interval History.        OBJECTIVE:  Vital Signs Last 24 Hrs  T(C): 36.8 (03 Oct 2020 20:45), Max: 36.8 (03 Oct 2020 05:03)  T(F): 98.2 (03 Oct 2020 20:45), Max: 98.2 (03 Oct 2020 05:03)  HR: 82 (03 Oct 2020 20:45) (57 - 82)  BP: 124/72 (03 Oct 2020 20:45) (113/52 - 131/70)  BP(mean): --  RR: 18 (03 Oct 2020 20:45) (18 - 18)  SpO2: 98% (03 Oct 2020 20:45) (97% - 99%)    General Examination:  General: No acute distress  HEENT: Atraumatic, Normocephalic  Respiratory: CTA B/l.  No crackles, rhonchi, or wheezes.  Cardiovascular: RRR.  Normal S1 & S2.  Normal b/l radial and pedal pulses.    Neurological Examination:  General / Mental Status: AAO x 3.  No aphasia or dysarthria.  Naming and repetition intact.  Cranial Nerves: VFF x 4.  PERRL.  EOMI x 2, No nystagmus or diplopia.  B/l V1-V3 equal and intact to light touch and pinprick.  Symmetric facial movement and palate elevation.  B/l hearing equal to finger rub.  5/5 strength with b/l sternocleidomastoid & trapezius.  Midline tongue protrusion, with no atrophy or fasciculations.  Motor: Normal bulk & tone in all four extremities.  5/5 strength throughout all four extremities.  No downward drift, rigidity, spasticity, or tremors in any of the four extremities.  Sensory: Intact to light touch and pinprick in all four extremities.  Negative Romberg.  Reflex: 2+ and symmetric at b/l biceps, triceps, brachioradialis, patellae, and ankles.  Downgoing toes b/l.  Coordination: No dysmetria with b/l finger-to-nose and heel raise tests.  Symmetric rapid alternating movements b/l.  Gait: Normal, narrow-based gait.  No difficulty with tiptoe, heel, and tandem gaits.        LABORATORY VALUES:                          13.0   12.93 )-----------( 213      ( 03 Oct 2020 06:44 )             38.9       10-03    141  |  107  |  12  ----------------------------<  103<H>  3.9   |  28  |  0.65    Ca    9.0      03 Oct 2020 06:44  Phos  3.4     10-03  Mg     2.4     10-03    TPro  7.2  /  Alb  3.2<L>  /  TBili  0.3  /  DBili  x   /  AST  37  /  ALT  57  /  AlkPhos  58  10-03      LIVER FUNCTIONS - ( 03 Oct 2020 06:44 )  Alb: 3.2 g/dL / Pro: 7.2 g/dL / ALK PHOS: 58 U/L / ALT: 57 U/L DA / AST: 37 U/L / GGT: x             10-02 Chol 290<H>  HDL 72 Trig 104    Glucose Trend  10-03-20 @ 06:44   -  -- 103<H> --  10-02-20 @ 07:12   -  -- 114<H> --  10-01-20 @ 07:02   -  -- 119<H> --            Folate, Serum: 8.4 ng/mL (10-02-20 @ 10:20)  Vitamin B12, Serum: 596 pg/mL (10-02-20 @ 10:20)              NEUROIMAGING:          Please contact the Neurology consult service with any questions.      Jay Sims MD   of Neurology  [facility]         NEUROLOGY FOLLOW-UP NOTE    NAME:  SERVANDO FAITH      ASSESSMENT:  45 RHF with persistent bitemporal and posterior headaches, exacerbated by anxiety, improved with amitriptyline      RECOMMENDATIONS:    - Continue amitriptyline 25mg PO daily at bedtime for managing chronic daily headache - Patient is currently benefiting from this medication       - Monitor for arrhythmia or other cardiac abnormalities while patient is on amitriptyline       - May consider topiramate or zonisamide if headaches recur despite being on amitriptyline, given the possibility of IIH    - Management of anxiety as per primary team    - Routine follow-up in Neurology clinic for headache management          ******************************    HPI:  Patient is a 45 year old female, obese, with PMHx H. Pylori, recently finished triple therapy September is presenting with chief complaint of headache, palpitations, and rash. Patient was laying in bed, watching television when trying to sleep and then developed: headache, palpitations, chest tightness, SOB, rash, nausea, joint pain in the hands and knees, chills and dizziness. This is patient's third episode of the same symptoms. The first time was 2-3 months ago, and the previous episode was 15 days ago. On the previous episode, patient went to [facility] in which she was told it was anxiety and sent home on steroids for the rash, sucralfate for abdominal pain. She also had CT head at that time that revealed a subarachnoid cyst. For the past one year, patient has been dealing with pruitic rashes that started in her hands and have spread to her back, chest, and face. She presented with it this time, but resolved with the medications she took in the ED. Patient also had a recent history of EGD that revealed H. Pylori and patient completed therapy in September 2020. Patient drinks vodka which she endorses to be 2 times a week, each time approximately 3-4 drinks. Patient has had recent history of diarrhea. Denied any fevers, no weakness, no episodes of syncope, no pressure-like chest pain. Patient's last menstrual period was September 1st, and prior to that, she has not had any for 1 year.    In the ED, patient's rash and symptoms resolved with Xanax, Benadryl and IV steroids. However patient had persistent sinus tachycardia and subsequently admitted to [facility]. Labs unremarkable except for transaminitis that seems to be improving from past. Hemodynamically stable with sinus tachycardia. Patient obtained a CTA Chest that showed no PE, with pulm nodules, prominent hilar lymph node. (01 Oct 2020 16:34)      NEURO HPI:  Interview conducted in [place]. 45 RHF had an episode of chest pain and posterior headache in the setting of an anxiety attack. She had another anxiety attack, also with chest pain, about 2-3 weeks ago. She and her  (at bedside) drink alcohol frequently, but denies binge drinking. She notes that she has been having daily throbbing headache at both temples for at least the past year.      INTERVAL HISTORY:  Interview conducted in [place]. The patient reports improvement in her headache intensity while on amitriptyline.      MEDICATIONS:  acetaminophen   Tablet .. 650 milliGRAM(s) Oral every 6 hours PRN  amitriptyline 25 milliGRAM(s) Oral at bedtime  diphenhydrAMINE 25 milliGRAM(s) Oral every 6 hours PRN  enoxaparin Injectable 40 milliGRAM(s) SubCutaneous daily  ergocalciferol 66641 Unit(s) Oral <User Schedule>  LORazepam   Injectable 2 milliGRAM(s) IV Push every 4 hours PRN  methylPREDNISolone sodium succinate Injectable 40 milliGRAM(s) IV Push daily  multivitamin/minerals 1 Tablet(s) Oral daily  pantoprazole    Tablet 40 milliGRAM(s) Oral before breakfast  propranolol 10 milliGRAM(s) Oral two times a day    ALLERGIES:  No Known Allergies    REVIEW OF SYSTEMS:  Fourteen systems reviewed and negative except as in HPI / Interval History.        OBJECTIVE:  Vital Signs Last 24 Hrs  T(C): 36.8 (03 Oct 2020 20:45), Max: 36.8 (03 Oct 2020 05:03)  T(F): 98.2 (03 Oct 2020 20:45), Max: 98.2 (03 Oct 2020 05:03)  HR: 82 (03 Oct 2020 20:45) (57 - 82)  BP: 124/72 (03 Oct 2020 20:45) (113/52 - 131/70)  RR: 18 (03 Oct 2020 20:45) (18 - 18)  SpO2: 98% (03 Oct 2020 20:45) (97% - 99%)    General Examination:  General: No acute distress  HEENT: Atraumatic, Normocephalic  Respiratory: CTA B/l.  No crackles, rhonchi, or wheezes.  Cardiovascular: RRR.  Normal S1 & S2.  Normal b/l radial and pedal pulses.    Neurological Examination:  General / Mental Status: AAO x 3.  No aphasia or dysarthria.  Naming and repetition intact.  Cranial Nerves: VFF x 4.  PERRL.  EOMI x 2, No nystagmus or diplopia.  B/l V1-V3 equal and intact to light touch and pinprick.  Symmetric facial movement and palate elevation.  B/l hearing equal to finger rub.  5/5 strength with b/l sternocleidomastoid & trapezius.  Midline tongue protrusion, with no atrophy or fasciculations.  Motor: Normal bulk & tone in all four extremities.  5/5 strength throughout all four extremities.  No downward drift, rigidity, spasticity, or tremors in any of the four extremities.  Sensory: Intact to light touch and pinprick in all four extremities.  Reflex: 2+ and symmetric at b/l biceps, triceps, brachioradialis, patellae, and ankles.  Downgoing toes b/l.  Coordination: No dysmetria with b/l finger-to-nose and heel raise tests.  Gait and Romberg testing deferred per patient request.        LABORATORY VALUES:                          13.0   12.93 )-----------( 213      ( 03 Oct 2020 06:44 )             38.9       10-03    141  |  107  |  12  ----------------------------<  103<H>  3.9   |  28  |  0.65    Ca    9.0      03 Oct 2020 06:44  Phos  3.4     10-03  Mg     2.4     10-03    TPro  7.2  /  Alb  3.2<L>  /  TBili  0.3  /  DBili  x   /  AST  37  /  ALT  57  /  AlkPhos  58  10-03      LIVER FUNCTIONS - ( 03 Oct 2020 06:44 )  Alb: 3.2 g/dL / Pro: 7.2 g/dL / ALK PHOS: 58 U/L / ALT: 57 U/L DA / AST: 37 U/L / GGT: x             10-02 Chol 290<H>  HDL 72 Trig 104    Glucose Trend  10-03-20 @ 06:44   -  -- 103<H> --  10-02-20 @ 07:12   -  -- 114<H> --  10-01-20 @ 07:02   -  -- 119<H> --    Folate, Serum: 8.4 ng/mL (10-02-20 @ 10:20)  Vitamin B12, Serum: 596 pg/mL (10-02-20 @ 10:20)        NEUROIMAGING:    CT Head (10/2/20):  - No acute intracranial abnormality  - Left middle cranial fossa 3.0 cm x 2.3 cm arachnoid cyst  - B/l atrophic optic nerves  - Partial empty sella        Please contact the Neurology consult service with any questions.      Jay Sims MD   of Neurology  [facility] at [facility]

## 2020-10-03 NOTE — DISCHARGE NOTE PROVIDER - NSDCCPCAREPLAN_GEN_ALL_CORE_FT
PRINCIPAL DISCHARGE DIAGNOSIS  Diagnosis: Anxiety  Assessment and Plan of Treatment: you presented with generalized symptoms and palpitations . you were seen by neurologist and cardiologist . your TSH,FSH,thyroid hormone levels were normal . you were started on amitryptilline and propranolol for anxiety . you were recommended to continue on them and follow up with your primary physician within one week of discharge      SECONDARY DISCHARGE DIAGNOSES  Diagnosis: Subarachnoid cyst  Assessment and Plan of Treatment:   you had  subarachnoid cyst in head CT  on previous hospital visit.  Repeat CT head was done and was  stable. you are recommended to follow up with your primary doctor within one week of discharge.      Diagnosis: Allergic reaction  Assessment and Plan of Treatment: you presented with rash and itching . your rash and symptoms resolved with Xanax, Benadryl and IV steroid. you are recommended to follow up with allergy clinic and follow up with your primary physician within one week of discharge    Diagnosis: Lung nodule < 6cm on CT  Assessment and Plan of Treatment: CTA chest showed lung nodule.you are recommended to follow up with chest CT after 6 months      Diagnosis: Sinus tachycardia  Assessment and Plan of Treatment: you presented with Sinus tachycardia. you wre seen by cardiologist and you were monitored closely . most likely it is related to your anxiety . tyou were started on propranolol. you are recommended to continue on it .     PRINCIPAL DISCHARGE DIAGNOSIS  Diagnosis: Allergic reaction  Assessment and Plan of Treatment: You presented with rash and itching . Your rash and symptoms resolved with Xanax, Benadryl and IV steroid. you are recommended to follow up with allergy clinic and follow up with your primary physician within one week of discharge.   Continue wuth  benadryl for itching.    You need to check NAPOLEON, Anti DS DNA and Rf for any rhumathological cause of your rash.   Follow up with Primary Care Physician within one week after discharge to inform of your recent hospitalization. Patient/Family was oriented on instruction.        SECONDARY DISCHARGE DIAGNOSES  Diagnosis: Sinus tachycardia  Assessment and Plan of Treatment: You presented with Sinus tachycardia. you wre seen by cardiologist and you were monitored closely . Your palpitations seem to be temporary and only associated with anxiety attack . You were started on propranolol. Please continue with your medication and follow up with Primary Care Physician within one week after discharge to inform of your recent hospitalization.       Diagnosis: Headache, unspecified  Assessment and Plan of Treatment: You were seen by neurologist for chronic daily hedache . Please  continue with amitriptyline 25mg dailyand routine follow-up in Neurology clinic Dr Sims  for headache management    Diagnosis: Adnexal cyst  Assessment and Plan of Treatment: Ct scan of abdoman and pelvis showed 3.3 cm right adnexal cyst. Please  follow-up ultrasound in one to 2 menstrual cycles to ensure resolution.    Follow up with Primary Care Physician within one week after discharge to inform of your recent hospitalization. Patient/Family was oriented on instruction.      Diagnosis: Hepatic steatosis  Assessment and Plan of Treatment: You found to have fatty liver on imaging. Your liver function test showed mild evelation.   Please follow up with your PCp and repeat your liver function test in 1 month.    Diagnosis: Anxiety  Assessment and Plan of Treatment: You presented with generalized symptoms and palpitations . you were seen by neurologist and cardiologist . Your thyroid function blood levels were normal . You were started on amitryptilline and propranolol for anxiety . you were recommended to continue on them and follow up with your primary physician and neurologist within one week of discharge.    Diagnosis: Lung nodule < 6cm on CT  Assessment and Plan of Treatment: CTA chest showed lung nodule. You tested positiev for Quantiferon test which is gold standad for tuberculosis. Plaese continue with Isoniazid for 6 months after discharge along with B6 vitamin.   you are recommended to follow up with pulmonologist Dr parisi for further evaluation , pulmonary function teast and repeat chest CT after 6 months to evaluate the size of nodule.   Follow up with Primary Care Physician within one week after discharge to inform of your recent hospitalization. Patient/Family was oriented on instruction.      Diagnosis: Subarachnoid cyst  Assessment and Plan of Treatment: You had  subarachnoid cyst in head CT  on previous hospital visit.  Repeat CT head was done and was  stable. you are recommended to follow up with your primary doctor within one week of discharge.

## 2020-10-03 NOTE — PROGRESS NOTE ADULT - SUBJECTIVE AND OBJECTIVE BOX
Patient is a 45y old  Female who presents with a chief complaint of Sinus Tachycardia (02 Oct 2020 11:38)    pt seen in tele [ x ], reg med floor [   ], bed [ x ], chair at bedside [   ], a+o x3 [x  ], lethargic [  ],    nad [x  ]      Allergies    No Known Allergies        Vitals    T(F): 98.2 (10-03-20 @ 05:03), Max: 98.5 (10-02-20 @ 20:19)  HR: 74 (10-03-20 @ 05:03) (62 - 87)  BP: 113/52 (10-03-20 @ 05:03) (113/52 - 135/75)  RR: 18 (10-03-20 @ 05:03) (18 - 18)  SpO2: 97% (10-03-20 @ 05:03) (96% - 100%)  Wt(kg): --  CAPILLARY BLOOD GLUCOSE          Labs                          13.6   15.83 )-----------( 249      ( 02 Oct 2020 07:12 )             41.4       10-02    137  |  105  |  10  ----------------------------<  114<H>  4.0   |  28  |  0.75    Ca    9.3      02 Oct 2020 07:12  Phos  2.5     10-02  Mg     2.3     10-02    TPro  8.2  /  Alb  3.5  /  TBili  0.7  /  DBili  0.1  /  AST  40  /  ALT  67<H>  /  AlkPhos  70  10-02      CARDIAC MARKERS ( 02 Oct 2020 07:12 )  <0.015 ng/mL / x     / x     / x     / x      CARDIAC MARKERS ( 01 Oct 2020 07:02 )  <0.015 ng/mL / x     / 104 U/L / x     / x                Radiology Results      Meds    MEDICATIONS  (STANDING):  amitriptyline 25 milliGRAM(s) Oral at bedtime  enoxaparin Injectable 40 milliGRAM(s) SubCutaneous daily  methylPREDNISolone sodium succinate Injectable 40 milliGRAM(s) IV Push daily  multivitamin/minerals 1 Tablet(s) Oral daily  pantoprazole    Tablet 40 milliGRAM(s) Oral before breakfast  propranolol 10 milliGRAM(s) Oral two times a day      MEDICATIONS  (PRN):  acetaminophen   Tablet .. 650 milliGRAM(s) Oral every 6 hours PRN Moderate Pain (4 - 6)  diphenhydrAMINE 25 milliGRAM(s) Oral every 6 hours PRN Rash and/or Itching  LORazepam   Injectable 2 milliGRAM(s) IV Push every 4 hours PRN CIWA>7      Physical Exam    Neuro :  no focal deficits  Respiratory: CTA B/L  CV: RRR, S1S2, no murmurs,   Abdominal: Soft, NT, ND +BS,  Extremities: No edema, + peripheral pulses  Skin : mild maculopapular rash on extremities and chest        ASSESSMENT    Tachycardia  possible 2nd to panic attack,    r/o acs,   lung nodule,   headache   h/o allergic rash,  anxiety,   Ectopic pregnancy  History of appendectomy      PLAN      con tele,   acs protocol,   cont propanol 10mg bid,   hr improved  cont aspirin, statin,   ce x1 neg noted above   cardio cons  f/u echo  pulm cons  f/u  quantiferon tb gold,   f/u paul, rf, anti dsdna,   ct head  ct abd pelv   cont benadryl prn pruritis  cont current meds       Patient is a 45y old  Female who presents with a chief complaint of Sinus Tachycardia (02 Oct 2020 11:38)    pt seen in tele [ x ], reg med floor [   ], bed [ x ], chair at bedside [   ], a+o x3 [x  ], lethargic [  ],    nad [x  ]      Allergies    No Known Allergies        Vitals    T(F): 98.2 (10-03-20 @ 05:03), Max: 98.5 (10-02-20 @ 20:19)  HR: 74 (10-03-20 @ 05:03) (62 - 87)  BP: 113/52 (10-03-20 @ 05:03) (113/52 - 135/75)  RR: 18 (10-03-20 @ 05:03) (18 - 18)  SpO2: 97% (10-03-20 @ 05:03) (96% - 100%)  Wt(kg): --  CAPILLARY BLOOD GLUCOSE          Labs                          13.6   15.83 )-----------( 249      ( 02 Oct 2020 07:12 )             41.4       10-02    137  |  105  |  10  ----------------------------<  114<H>  4.0   |  28  |  0.75    Ca    9.3      02 Oct 2020 07:12  Phos  2.5     10-02  Mg     2.3     10-02    TPro  8.2  /  Alb  3.5  /  TBili  0.7  /  DBili  0.1  /  AST  40  /  ALT  67<H>  /  AlkPhos  70  10-02      CARDIAC MARKERS ( 02 Oct 2020 07:12 )  <0.015 ng/mL / x     / x     / x     / x      CARDIAC MARKERS ( 01 Oct 2020 07:02 )  <0.015 ng/mL / x     / 104 U/L / x     / x                Radiology Results    < from: CT Head No Cont (10.02.20 @ 10:21) >  IMPRESSION:  No acute intracranial hemorrhage or acute territorial infarct.  If symptoms persist, follow-up MRI exam recommended.    < end of copied text >        < from: CT Abdomen and Pelvis No Cont (10.02.20 @ 10:21) >  FINDINGS:  LOWER CHEST: Within normal limits.    LIVER: Hepatic steatosis.  BILE DUCTS: Normal caliber.  GALLBLADDER: Within normal limits.  SPLEEN: Within normal limits.  PANCREAS: Within normal limits.  ADRENALS: Within normal limits. No adrenal mass.  KIDNEYS/URETERS: Within normal limits.    BLADDER: Within normal limits.  REPRODUCTIVE ORGANS: Unremarkable uterus. 3.3 cm right adnexal cyst.    BOWEL: No bowel obstruction. Appendix is normal.  PERITONEUM: No ascites.  VESSELS: Within normal limits.  RETROPERITONEUM/LYMPH NODES: No lymphadenopathy.  ABDOMINAL WALL: Small fat-containing umbilical hernia.  BONES: Within normal limits.    IMPRESSION:    3.3 cm right adnexal cyst. Recommend follow-up ultrasound in one to 2 menstrual cycles to ensure resolution.    Hepatic steatosis.    < end of copied text >      Meds    MEDICATIONS  (STANDING):  amitriptyline 25 milliGRAM(s) Oral at bedtime  enoxaparin Injectable 40 milliGRAM(s) SubCutaneous daily  methylPREDNISolone sodium succinate Injectable 40 milliGRAM(s) IV Push daily  multivitamin/minerals 1 Tablet(s) Oral daily  pantoprazole    Tablet 40 milliGRAM(s) Oral before breakfast  propranolol 10 milliGRAM(s) Oral two times a day      MEDICATIONS  (PRN):  acetaminophen   Tablet .. 650 milliGRAM(s) Oral every 6 hours PRN Moderate Pain (4 - 6)  diphenhydrAMINE 25 milliGRAM(s) Oral every 6 hours PRN Rash and/or Itching  LORazepam   Injectable 2 milliGRAM(s) IV Push every 4 hours PRN CIWA>7      Physical Exam    Neuro :  no focal deficits  Respiratory: CTA B/L  CV: RRR, S1S2, no murmurs,   Abdominal: Soft, NT, ND +BS,  Extremities: No edema, + peripheral pulses  Skin : mild maculopapular rash on extremities and chest        ASSESSMENT    Tachycardia  possible 2nd to panic attack,    r/o acs,   lung nodule,   headache   h/o allergic rash,  anxiety,   Ectopic pregnancy  History of appendectomy      PLAN      con tele,   acs protocol,   cont propanol 10mg bid,   hr improved  cont aspirin, statin,   ce x1 neg noted above   cardio f/u  f/u echo  pulm f/u  f/u  quantiferon tb gold,   PFTs as OP.  f/u paul, rf, anti dsdna,   ct head with No acute intracranial hemorrhage or acute territorial infarct noted above  neuro cons noted  cont amitriptyline 25mg PO daily for managing chronic daily headache  Monitor for arrhythmia or other cardiac abnormalities while patient is on amitriptyline (patient's palpitations seem to be temporary and only associated with anxiety attack)  Routine follow-up in Neurology clinic for headache management  ct abd pelv with 3.3 cm right adnexal cyst. Recommend follow-up ultrasound in one to 2 menstrual cycles to ensure resolution. Hepatic steatosis noted above.  cont benadryl prn pruritis  cont current meds

## 2020-10-03 NOTE — DISCHARGE NOTE PROVIDER - HOSPITAL COURSE
Patient is a 45 year old female, obese, with PMHx H. Pylori, recently finished triple therapy September is presenting with chief complaint of headache, palpitations, and rash. Patient was laying in bed, watching television when trying to sleep and then developed: headache, palpitations, chest tightness, SOB, rash, nausea, joint pain in the hands and knees, chills and dizziness. This is patient's third episode of the same symptoms. The first time was 2-3 months ago, and the previous episode was 15 days ago. On the previous episode, patient went to [facility] in which she was told it was anxiety and sent home on steroids for the rash, sucralfate for abdominal pain. She also had CT head at that time that revealed a subarachnoid cyst. For the past one year, patient has been dealing with pruitic rashes that started in her hands and have spread to her back, chest, and face. She presented with it this time, but resolved with the medications she took in the ED. Patient also had a recent history of EGD that revealed H. Pylori and patient completed therapy in September 2020. Patient drinks vodka which she endorses to be 2 times a week, each time approximately 3-4 drinks. Patient has had recent history of diarrhea. Denied any fevers, no weakness, no episodes of syncope, no pressure-like chest pain. Patient's last menstrual period was September 1st, and prior to that, she has not had any for 1 year.  In the ED, patient's rash and symptoms resolved with Xanax, Benadryl and IV steroids. However patient had persistent sinus tachycardia and subsequently admitted to tele. Labs unremarkable except for transaminitis that seems to be improving from past. Hemodynamically stable with sinus tachycardia. Patient obtained a CTA Chest that showed no PE, with pulm nodules, prominent hilar lymph node. her TSH,FSH,thyroid hormone is normal          Patient is a 45 year old female, obese, with PMHx H. Pylori, recently finished triple therapy September is presenting with chief complaint of headache, palpitations, and rash. Patient was laying in bed, watching television when trying to sleep and then developed: headache, palpitations, chest tightness, SOB, rash, nausea, joint pain in the hands and knees, chills and dizziness. This is patient's third episode of the same symptoms. The first time was 2-3 months ago, and the previous episode was 15 days ago. On the previous episode, patient went to [facility] in which she was told it was anxiety and sent home on steroids for the rash, sucralfate for abdominal pain. She also had CT head at that time that revealed a subarachnoid cyst. For the past one year, patient has been dealing with pruitic rashes that started in her hands and have spread to her back, chest, and face. She presented with it this time, but resolved with the medications she took in the ED. Patient also had a recent history of EGD that revealed H. Pylori and patient completed therapy in September 2020. Patient drinks vodka which she endorses to be 2 times a week, each time approximately 3-4 drinks. Patient has had recent history of diarrhea. Denied any fevers, no weakness, no episodes of syncope, no pressure-like chest pain. Patient's last menstrual period was September 1st, and prior to that, she has not had any for 1 year.  In the ED, patient's rash and symptoms resolved with Xanax, Benadryl and IV steroids. However patient had persistent sinus tachycardia and subsequently admitted to tele. Labs unremarkable except for transaminitis that seems to be improving from past. Hemodynamically stable with sinus tachycardia. Patient obtained a CTA Chest that showed no PE, with pulm nodules, prominent hilar lymph node. her TSH,FSH,thyroid hormone is normal .neurology and cardiology were consulted. patient started on amitryptalline and propranolol for anxiety .      Given patient's improved clinical status and current hemodynamic stability, decision was made to discharge.

## 2020-10-03 NOTE — DISCHARGE NOTE PROVIDER - CARE PROVIDERS DIRECT ADDRESSES
[email or web address]

## 2020-10-03 NOTE — DISCHARGE NOTE PROVIDER - CARE PROVIDER_API CALL
Jay Sims)  Neurology  Epilepsy  [address], [address]  [place] [address]  Phone: [phone]  Follow Up Time:     Pete Lopez)  Internal Medicine; Pulmonary Disease  [facility] [address] [place] [address]  Phone: [phone]  Fax: [phone]  Follow Up Time:

## 2020-10-03 NOTE — PROGRESS NOTE ADULT - PROBLEM SELECTOR PLAN 1
With persistent sinus tachycardia, even when acute episodes of various symptoms are controlled.  PE was ruled on with CTA chest.  -now on propanolol 10mg BID for sinus tachy, questionable due to anxiety vs underlying endocrine pathology.  - Monitor on tele, highly doubt cardiac cause of symptoms With persistent sinus tachycardia, even when acute episodes of various symptoms are controlled.  PE was ruled on with CTA chest.  -now on propanolol 10mg BID for sinus tachy, questionable due to anxiety vs underlying endocrine pathology.  - Monitor on tele, highly doubt cardiac cause of symptoms  -fu echo

## 2020-10-03 NOTE — PROGRESS NOTE ADULT - SUBJECTIVE AND OBJECTIVE BOX
Patient is a 45y old  Female who presents with a chief complaint of Sinus Tachycardia (03 Oct 2020 09:54)  Awake, alert, comfortable in bed in NAD. Former smoker. Denies cough but has [place]    INTERVAL HPI/OVERNIGHT EVENTS:      VITAL SIGNS:  T(F): 97.5 (10-03-20 @ 07:25)  HR: 62 (10-03-20 @ 07:25)  BP: 124/66 (10-03-20 @ 07:25)  RR: 18 (10-03-20 @ 07:25)  SpO2: 97% (10-03-20 @ 07:25)  Wt(kg): --  I&O's Detail          REVIEW OF SYSTEMS:    CONSTITUTIONAL:  No fevers, chills, sweats    HEENT:  Eyes:  No diplopia or blurred vision. ENT:  No earache, sore throat or runny nose.    CARDIOVASCULAR:  No pressure, squeezing, tightness, or heaviness about the chest; no palpitations.    RESPIRATORY:  Per HPI    GASTROINTESTINAL:  No abdominal pain, nausea, vomiting or diarrhea.    GENITOURINARY:  No dysuria, frequency or urgency.    NEUROLOGIC:  No paresthesias, fasciculations, seizures or weakness.    PSYCHIATRIC:  No disorder of thought or mood.      PHYSICAL EXAM:    Constitutional: Well developed and nourished  Eyes:Perrla  ENMT: normal  Neck:supple  Respiratory: good air entry  Cardiovascular: S1 S2 regular  Gastrointestinal: Soft, Non tender  Extremities: No edema  Vascular:normal  Neurological:Awake, alert,Ox3  Musculoskeletal:Normal      MEDICATIONS  (STANDING):  amitriptyline 25 milliGRAM(s) Oral at bedtime  enoxaparin Injectable 40 milliGRAM(s) SubCutaneous daily  ergocalciferol 07947 Unit(s) Oral <User Schedule>  methylPREDNISolone sodium succinate Injectable 40 milliGRAM(s) IV Push daily  multivitamin/minerals 1 Tablet(s) Oral daily  pantoprazole    Tablet 40 milliGRAM(s) Oral before breakfast  propranolol 10 milliGRAM(s) Oral two times a day    MEDICATIONS  (PRN):  acetaminophen   Tablet .. 650 milliGRAM(s) Oral every 6 hours PRN Moderate Pain (4 - 6)  diphenhydrAMINE 25 milliGRAM(s) Oral every 6 hours PRN Rash and/or Itching  LORazepam   Injectable 2 milliGRAM(s) IV Push every 4 hours PRN CIWA>7      Allergies    No Known Allergies    Intolerances        LABS:                        13.0   12.93 )-----------( 213      ( 03 Oct 2020 06:44 )             38.9     10-03    141  |  107  |  12  ----------------------------<  103<H>  3.9   |  28  |  0.65    Ca    9.0      03 Oct 2020 06:44  Phos  3.4     10-03  Mg     2.4     10-03    TPro  7.2  /  Alb  3.2<L>  /  TBili  0.3  /  DBili  x   /  AST  37  /  ALT  57  /  AlkPhos  58  10-03          CARDIAC MARKERS ( 02 Oct 2020 07:12 )  <0.015 ng/mL / x     / x     / x     / x          CAPILLARY BLOOD GLUCOSE            RADIOLOGY & ADDITIONAL TESTS:    CXR:x< from: CT Abdomen and Pelvis No Cont (10.02.20 @ 10:21) >  IMPRESSION:    3.3 cm right adnexal cyst. Recommend follow-up ultrasound in one to 2 menstrual cycles to ensure resolution.    Hepatic steatosis.        < end of copied text >  < from: CT Angio Chest w/ IV Cont (10.01.20 @ 12:03) >  IMPRESSION:  No large central pulmonary emboli. Evaluation of the segmental and subsegmental branches is limited as above.    Enlarged right hilar lymph node and prominent left hilar lymph node, nonspecific.    6 mm nodule superior segment right lower lobe. Recommend 6 month follow-up to ensure stability or resolution.      < end of copied text >      Ct scan chest:    ekg;    echo:

## 2020-10-03 NOTE — PROGRESS NOTE ADULT - PROBLEM SELECTOR PLAN 5
With finding of lung lesion on CTA chest  Patient will need 6month follow up CT Chest With finding of lung nodule on CTA chest  Patient will need 6month follow up CT Chest

## 2020-10-03 NOTE — DISCHARGE NOTE PROVIDER - NSDCMRMEDTOKEN_GEN_ALL_CORE_FT
Carafate 1 g/10 mL oral suspension: 10 milliliter(s) orally every 8 hours, As Needed for abdominal pain. Take 30 minutes to one hour before meal.    hydrOXYzine hydrochloride 25 mg oral tablet: 1 tab(s) orally once a day (at bedtime)    mg oral tablet: 1 tab(s) orally 3 times a day  Pepcid 20 mg oral tablet: 1 tab(s) orally 2 times a day    amitriptyline 25 mg oral tablet: 1 tab(s) orally once a day (at bedtime)  Carafate 1 g/10 mL oral suspension: 10 milliliter(s) orally every 8 hours, As Needed for abdominal pain. Take 30 minutes to one hour before meal.    hydrOXYzine hydrochloride 25 mg oral tablet: 1 tab(s) orally once a day (at bedtime)    mg oral tablet: 1 tab(s) orally 3 times a day  Multiple Vitamins with Minerals oral tablet: 1 tab(s) orally once a day  Pepcid 20 mg oral tablet: 1 tab(s) orally 2 times a day   propranolol 10 mg oral tablet: 1 tab(s) orally 2 times a day   amitriptyline 25 mg oral tablet: 1 tab(s) orally once a day (at bedtime)  aspirin 81 mg oral delayed release tablet: 1 tab(s) orally once a day   atorvastatin 20 mg oral tablet: 1 tab(s) orally once a day   Carafate 1 g/10 mL oral suspension: 10 milliliter(s) orally every 8 hours, As Needed for abdominal pain. Take 30 minutes to one hour before meal.    hydrOXYzine hydrochloride 25 mg oral tablet: 1 tab(s) orally once a day (at bedtime)    mg oral tablet: 1 tab(s) orally 3 times a day  isoniazid 300 mg oral tablet: 1 tab(s) orally once a day   Multiple Vitamins with Minerals oral tablet: 1 tab(s) orally once a day  pantoprazole 40 mg oral delayed release tablet: 1 tab(s) orally once a day (before a meal)  Pepcid 20 mg oral tablet: 1 tab(s) orally 2 times a day   propranolol 10 mg oral tablet: 1 tab(s) orally 2 times a day  pyridoxine 50 mg oral tablet: 1 tab(s) orally once a day

## 2020-10-03 NOTE — PROGRESS NOTE ADULT - SUBJECTIVE AND OBJECTIVE BOX
PGY-1 Progress Note discussed with attending    PAGER #: [[phone]] TILL 5:00 PM  PLEASE CONTACT ON CALL TEAM:  - On Call Team (Please refer to Anthony) FROM 5:00 PM - 8:30PM  - Nightfloat Team FROM 8:30 -7:30 AM    CHIEF COMPLAINT & BRIEF HOSPITAL COURSE:    INTERVAL HPI/OVERNIGHT EVENTS:       REVIEW OF SYSTEMS:  CONSTITUTIONAL: No fever, weight loss, or fatigue  RESPIRATORY: No cough, wheezing, chills or hemoptysis; No shortness of breath  CARDIOVASCULAR: No chest pain, palpitations, dizziness, or leg swelling  GASTROINTESTINAL: No abdominal pain. No nausea, vomiting, or hematemesis; No diarrhea or constipation. No melena or hematochezia.  GENITOURINARY: No dysuria or hematuria, urinary frequency  NEUROLOGICAL: No headaches, memory loss, loss of strength, numbness, or tremors  SKIN: No itching, burning, rashes, or lesions     MEDICATIONS  (STANDING):  amitriptyline 25 milliGRAM(s) Oral at bedtime  enoxaparin Injectable 40 milliGRAM(s) SubCutaneous daily  ergocalciferol 53263 Unit(s) Oral <User Schedule>  methylPREDNISolone sodium succinate Injectable 40 milliGRAM(s) IV Push daily  multivitamin/minerals 1 Tablet(s) Oral daily  pantoprazole    Tablet 40 milliGRAM(s) Oral before breakfast  propranolol 10 milliGRAM(s) Oral two times a day    MEDICATIONS  (PRN):  acetaminophen   Tablet .. 650 milliGRAM(s) Oral every 6 hours PRN Moderate Pain (4 - 6)  diphenhydrAMINE 25 milliGRAM(s) Oral every 6 hours PRN Rash and/or Itching  LORazepam   Injectable 2 milliGRAM(s) IV Push every 4 hours PRN CIWA>7      Vital Signs Last 24 Hrs  T(C): 36.4 (03 Oct 2020 07:25), Max: 36.9 (02 Oct 2020 20:19)  T(F): 97.5 (03 Oct 2020 07:25), Max: 98.5 (02 Oct 2020 20:19)  HR: 62 (03 Oct 2020 07:25) (62 - 87)  BP: 124/66 (03 Oct 2020 07:25) (113/52 - 135/75)  BP(mean): 89 (02 Oct 2020 12:46) (89 - 89)  RR: 18 (03 Oct 2020 07:25) (18 - 18)  SpO2: 97% (03 Oct 2020 07:25) (96% - 100%)    PHYSICAL EXAMINATION:  GENERAL: NAD, well built  HEAD:  Atraumatic, Normocephalic  EYES:  conjunctiva and sclera clear  NECK: Supple, No JVD, Normal thyroid  CHEST/LUNG: Clear to auscultation. Clear to percussion bilaterally; No rales, rhonchi, wheezing, or rubs  HEART: Regular rate and rhythm; No murmurs, rubs, or gallops  ABDOMEN: Soft, Nontender, Nondistended; Bowel sounds present  NERVOUS SYSTEM:  Alert & Oriented X3,    EXTREMITIES:  2+ Peripheral Pulses, No clubbing, cyanosis, or edema  SKIN: warm dry                          13.0   12.93 )-----------( 213      ( 03 Oct 2020 06:44 )             38.9     10-03    141  |  107  |  12  ----------------------------<  103<H>  3.9   |  28  |  0.65    Ca    9.0      03 Oct 2020 06:44  Phos  3.4     10-03  Mg     2.4     10-03    TPro  7.2  /  Alb  3.2<L>  /  TBili  0.3  /  DBili  x   /  AST  37  /  ALT  57  /  AlkPhos  58  10-03    LIVER FUNCTIONS - ( 03 Oct 2020 06:44 )  Alb: 3.2 g/dL / Pro: 7.2 g/dL / ALK PHOS: 58 U/L / ALT: 57 U/L DA / AST: 37 U/L / GGT: x           CARDIAC MARKERS ( 02 Oct 2020 07:12 )  <0.015 ng/mL / x     / x     / x     / x                  CAPILLARY BLOOD GLUCOSE  CAPILLARY BLOOD GLUCOSE        CAPILLARY BLOOD GLUCOSE          RADIOLOGY & ADDITIONAL TESTS:                   PGY-1 Progress Note discussed with attending    PAGER #: [[phone]] TILL 5:00 PM  PLEASE CONTACT ON CALL TEAM:  - On Call Team (Please refer to Anthony) FROM 5:00 PM - 8:30PM  - Nightfloat Team FROM 8:30 -7:30 AM    CHIEF COMPLAINT & BRIEF HOSPITAL COURSE:Patient is a 45 year old female, obese, with PMHx H. Pylori, recently finished triple therapy September is presenting with chief complaint of headache, palpitations, and rash. Patient was laying in bed, watching television when trying to sleep and then developed: headache, palpitations, chest tightness, SOB, rash, nausea, joint pain in the hands and knees, chills and dizziness. This is patient's third episode of the same symptoms. The first time was 2-3 months ago, and the previous episode was 15 days ago. On the previous episode, patient went to [facility] in which she was told it was anxiety and sent home on steroids for the rash, sucralfate for abdominal pain. She also had CT head at that time that revealed a subarachnoid cyst. For the past one year, patient has been dealing with pruitic rashes that started in her hands and have spread to her back, chest, and face. She presented with it this time, but resolved with the medications she took in the ED. Patient also had a recent history of EGD that revealed H. Pylori and patient completed therapy in September 2020. Patient drinks vodka which she endorses to be 2 times a week, each time approximately 3-4 drinks. Patient has had recent history of diarrhea. Denied any fevers, no weakness, no episodes of syncope, no pressure-like chest pain. Patient's last menstrual period was September 1st, and prior to that, she has not had any for 1 year.    In the ED, patient's rash and symptoms resolved with Xanax, Benadryl and IV steroids. However patient had persistent sinus tachycardia and subsequently admitted to tele. Labs unremarkable except for transaminitis that seems to be improving from past. Hemodynamically stable with sinus tachycardia. Patient obtained a CTA Chest that showed no PE, with pulm nodules, prominent hilar lymph node.           INTERVAL HPI/OVERNIGHT EVENTS:   p      REVIEW OF SYSTEMS:  CONSTITUTIONAL: No fever, weight loss, or fatigue  RESPIRATORY: No cough, wheezing, chills or hemoptysis; No shortness of breath  CARDIOVASCULAR: No chest pain, palpitations, dizziness, or leg swelling  GASTROINTESTINAL: No abdominal pain. No nausea, vomiting, or hematemesis; No diarrhea or constipation. No melena or hematochezia.  GENITOURINARY: No dysuria or hematuria, urinary frequency  NEUROLOGICAL: No headaches, memory loss, loss of strength, numbness, or tremors  SKIN: No itching, burning, rashes, or lesions     MEDICATIONS  (STANDING):  amitriptyline 25 milliGRAM(s) Oral at bedtime  enoxaparin Injectable 40 milliGRAM(s) SubCutaneous daily  ergocalciferol 00209 Unit(s) Oral <User Schedule>  methylPREDNISolone sodium succinate Injectable 40 milliGRAM(s) IV Push daily  multivitamin/minerals 1 Tablet(s) Oral daily  pantoprazole    Tablet 40 milliGRAM(s) Oral before breakfast  propranolol 10 milliGRAM(s) Oral two times a day    MEDICATIONS  (PRN):  acetaminophen   Tablet .. 650 milliGRAM(s) Oral every 6 hours PRN Moderate Pain (4 - 6)  diphenhydrAMINE 25 milliGRAM(s) Oral every 6 hours PRN Rash and/or Itching  LORazepam   Injectable 2 milliGRAM(s) IV Push every 4 hours PRN CIWA>7      Vital Signs Last 24 Hrs  T(C): 36.4 (03 Oct 2020 07:25), Max: 36.9 (02 Oct 2020 20:19)  T(F): 97.5 (03 Oct 2020 07:25), Max: 98.5 (02 Oct 2020 20:19)  HR: 62 (03 Oct 2020 07:25) (62 - 87)  BP: 124/66 (03 Oct 2020 07:25) (113/52 - 135/75)  BP(mean): 89 (02 Oct 2020 12:46) (89 - 89)  RR: 18 (03 Oct 2020 07:25) (18 - 18)  SpO2: 97% (03 Oct 2020 07:25) (96% - 100%)    PHYSICAL EXAMINATION:  GENERAL: NAD, well built  HEAD:  Atraumatic, Normocephalic  EYES:  conjunctiva and sclera clear  NECK: Supple, No JVD, Normal thyroid  CHEST/LUNG: Clear to auscultation. Clear to percussion bilaterally; No rales, rhonchi, wheezing, or rubs  HEART: Regular rate and rhythm; No murmurs, rubs, or gallops  ABDOMEN: Soft, Nontender, Nondistended; Bowel sounds present  NERVOUS SYSTEM:  Alert & Oriented X3,    EXTREMITIES:  2+ Peripheral Pulses, No clubbing, cyanosis, or edema  SKIN: warm dry                          13.0   12.93 )-----------( 213      ( 03 Oct 2020 06:44 )             38.9     10-03    141  |  107  |  12  ----------------------------<  103<H>  3.9   |  28  |  0.65    Ca    9.0      03 Oct 2020 06:44  Phos  3.4     10-03  Mg     2.4     10-03    TPro  7.2  /  Alb  3.2<L>  /  TBili  0.3  /  DBili  x   /  AST  37  /  ALT  57  /  AlkPhos  58  10-03    LIVER FUNCTIONS - ( 03 Oct 2020 06:44 )  Alb: 3.2 g/dL / Pro: 7.2 g/dL / ALK PHOS: 58 U/L / ALT: 57 U/L DA / AST: 37 U/L / GGT: x           CARDIAC MARKERS ( 02 Oct 2020 07:12 )  <0.015 ng/mL / x     / x     / x     / x                  CAPILLARY BLOOD GLUCOSE  CAPILLARY BLOOD GLUCOSE        CAPILLARY BLOOD GLUCOSE          RADIOLOGY & ADDITIONAL TESTS:                   PGY-1 Progress Note discussed with attending    PAGER #: [[phone]] TILL 5:00 PM  PLEASE CONTACT ON CALL TEAM:  - On Call Team (Please refer to Anthony) FROM 5:00 PM - 8:30PM  - Nightfloat Team FROM 8:30 -7:30 AM    CHIEF COMPLAINT & BRIEF HOSPITAL COURSE:Patient is a 45 year old female, obese, with PMHx H. Pylori, recently finished triple therapy September is presenting with chief complaint of headache, palpitations, and rash. Patient was laying in bed, watching television when trying to sleep and then developed: headache, palpitations, chest tightness, SOB, rash, nausea, joint pain in the hands and knees, chills and dizziness. This is patient's third episode of the same symptoms. The first time was 2-3 months ago, and the previous episode was 15 days ago. On the previous episode, patient went to [facility] in which she was told it was anxiety and sent home on steroids for the rash, sucralfate for abdominal pain. She also had CT head at that time that revealed a subarachnoid cyst. For the past one year, patient has been dealing with pruitic rashes that started in her hands and have spread to her back, chest, and face. She presented with it this time, but resolved with the medications she took in the ED. Patient also had a recent history of EGD that revealed H. Pylori and patient completed therapy in September 2020. Patient drinks vodka which she endorses to be 2 times a week, each time approximately 3-4 drinks. Patient has had recent history of diarrhea. Denied any fevers, no weakness, no episodes of syncope, no pressure-like chest pain. Patient's last menstrual period was September 1st, and prior to that, she has not had any for 1 year.    In the ED, patient's rash and symptoms resolved with Xanax, Benadryl and IV steroids. However patient had persistent sinus tachycardia and subsequently admitted to tele. Labs unremarkable except for transaminitis that seems to be improving from past. Hemodynamically stable with sinus tachycardia. Patient obtained a CTA Chest that showed no PE, with pulm nodules, prominent hilar lymph node.           INTERVAL HPI/OVERNIGHT EVENTS:   patient  examined bed side. her condition improved . no SOB. the rash resolved       REVIEW OF SYSTEMS:  CONSTITUTIONAL: No fever, weight loss, or fatigue  RESPIRATORY: No cough, wheezing, chills or hemoptysis; No shortness of breath  CARDIOVASCULAR: No chest pain, palpitations, dizziness, or leg swelling  GASTROINTESTINAL: No abdominal pain. No nausea, vomiting, or hematemesis; No diarrhea or constipation. No melena or hematochezia.  GENITOURINARY: No dysuria or hematuria, urinary frequency  NEUROLOGICAL: No headaches, memory loss, loss of strength, numbness, or tremors  SKIN: No itching, burning, rashes, or lesions     MEDICATIONS  (STANDING):  amitriptyline 25 milliGRAM(s) Oral at bedtime  enoxaparin Injectable 40 milliGRAM(s) SubCutaneous daily  ergocalciferol 00143 Unit(s) Oral <User Schedule>  methylPREDNISolone sodium succinate Injectable 40 milliGRAM(s) IV Push daily  multivitamin/minerals 1 Tablet(s) Oral daily  pantoprazole    Tablet 40 milliGRAM(s) Oral before breakfast  propranolol 10 milliGRAM(s) Oral two times a day    MEDICATIONS  (PRN):  acetaminophen   Tablet .. 650 milliGRAM(s) Oral every 6 hours PRN Moderate Pain (4 - 6)  diphenhydrAMINE 25 milliGRAM(s) Oral every 6 hours PRN Rash and/or Itching  LORazepam   Injectable 2 milliGRAM(s) IV Push every 4 hours PRN CIWA>7      Vital Signs Last 24 Hrs  T(C): 36.4 (03 Oct 2020 07:25), Max: 36.9 (02 Oct 2020 20:19)  T(F): 97.5 (03 Oct 2020 07:25), Max: 98.5 (02 Oct 2020 20:19)  HR: 62 (03 Oct 2020 07:25) (62 - 87)  BP: 124/66 (03 Oct 2020 07:25) (113/52 - 135/75)  BP(mean): 89 (02 Oct 2020 12:46) (89 - 89)  RR: 18 (03 Oct 2020 07:25) (18 - 18)  SpO2: 97% (03 Oct 2020 07:25) (96% - 100%)    PHYSICAL EXAMINATION:  GENERAL: NAD, well built  HEAD:  Atraumatic, Normocephalic  EYES:  conjunctiva and sclera clear  NECK: Supple, No JVD, Normal thyroid  CHEST/LUNG: Clear to auscultation. Clear to percussion bilaterally; No rales, rhonchi, wheezing, or rubs  HEART: Regular rate and rhythm; No murmurs, rubs, or gallops  ABDOMEN: Soft, Nontender, Nondistended; Bowel sounds present  NERVOUS SYSTEM:  Alert & Oriented X3,    EXTREMITIES:  2+ Peripheral Pulses, No clubbing, cyanosis, or edema  SKIN: warm dry                          13.0   12.93 )-----------( 213      ( 03 Oct 2020 06:44 )             38.9     10-03    141  |  107  |  12  ----------------------------<  103<H>  3.9   |  28  |  0.65    Ca    9.0      03 Oct 2020 06:44  Phos  3.4     10-03  Mg     2.4     10-03    TPro  7.2  /  Alb  3.2<L>  /  TBili  0.3  /  DBili  x   /  AST  37  /  ALT  57  /  AlkPhos  58  10-03    LIVER FUNCTIONS - ( 03 Oct 2020 06:44 )  Alb: 3.2 g/dL / Pro: 7.2 g/dL / ALK PHOS: 58 U/L / ALT: 57 U/L DA / AST: 37 U/L / GGT: x           CARDIAC MARKERS ( 02 Oct 2020 07:12 )  <0.015 ng/mL / x     / x     / x     / x                  CAPILLARY BLOOD GLUCOSE  CAPILLARY BLOOD GLUCOSE        CAPILLARY BLOOD GLUCOSE          RADIOLOGY & ADDITIONAL TESTS:

## 2020-10-03 NOTE — PROGRESS NOTE ADULT - PROBLEM SELECTOR PLAN 3
With pruitic rash which patient attributes happens occasionally after alcohol use.  - NAPOLEON sent to rule out SLE  - Control symptoms with benadryl as needed, steroids With pruitic rash which patient attributes happens occasionally after alcohol use.  - NAPOLEON neg  - Control symptoms with benadryl as needed, steroids

## 2020-10-03 NOTE — PROGRESS NOTE ADULT - PROBLEM SELECTOR PLAN 2
With myriad of symptoms, may be anxiety, however should rule out endocrinological pathology.  -  on propanolol  - Patient appears to be perimenopausal: will obtain TSH, FSH, estrogen levels.  - Rule out carcinoid, random 5-HIAA urine ordered.   - TSH, free T4 normal With myriad of symptoms, may be anxiety, however should rule out endocrinological pathology.  -  on propanolol    TSH, FSH,normal   - Rule out carcinoid, random 5-HIAA urine fu    - TSH, free T4 normal

## 2020-10-03 NOTE — PROGRESS NOTE ADULT - SUBJECTIVE AND OBJECTIVE BOX
CHIEF COMPLAINT:Patient is a 45y old  Female who presents with a chief complaint of Sinus Tachycardia.Pt appears comfortable.    	  REVIEW OF SYSTEMS:  CONSTITUTIONAL: No fever, weight loss, or fatigue  EYES: No eye pain, visual disturbances, or discharge  ENT:  No difficulty hearing, tinnitus, vertigo; No sinus or throat pain  NECK: No pain or stiffness  RESPIRATORY: No cough, wheezing, chills or hemoptysis; No Shortness of Breath  CARDIOVASCULAR: No chest pain, palpitations, passing out, dizziness, or leg swelling  GASTROINTESTINAL: No abdominal or epigastric pain. No nausea, vomiting, or hematemesis; No diarrhea or constipation. No melena or hematochezia.  GENITOURINARY: No dysuria, frequency, hematuria, or incontinence  NEUROLOGICAL: No headaches, memory loss, loss of strength, numbness, or tremors  SKIN: No itching, burning, rashes, or lesions   LYMPH Nodes: No enlarged glands  ENDOCRINE: No heat or cold intolerance; No hair loss  MUSCULOSKELETAL: No joint pain or swelling; No muscle, back, or extremity pain  PSYCHIATRIC: No depression, anxiety, mood swings, or difficulty sleeping  HEME/LYMPH: No easy bruising, or bleeding gums  ALLERGY AND IMMUNOLOGIC: No hives or eczema	      PHYSICAL EXAM:  T(C): 36.4 (10-03-20 @ 07:25), Max: 36.9 (10-02-20 @ 20:19)  HR: 62 (10-03-20 @ 07:25) (62 - 87)  BP: 124/66 (10-03-20 @ 07:25) (113/52 - 135/75)  RR: 18 (10-03-20 @ 07:25) (18 - 18)  SpO2: 97% (10-03-20 @ 07:25) (96% - 100%)  Wt(kg): --  I&O's Summary      Appearance: Normal	  HEENT:   Normal oral mucosa, PERRL, EOMI	  Lymphatic: No lymphadenopathy  Cardiovascular: Normal S1 S2, No JVD, No murmurs, No edema  Respiratory: Lungs clear to auscultation	  Psychiatry: A & O x 3, Mood & affect appropriate  Gastrointestinal:  Soft, Non-tender, + BS	  Skin: No rashes, No ecchymoses, No cyanosis	  Neurologic: Non-focal  Extremities: Normal range of motion, No clubbing, cyanosis or edema  Vascular: Peripheral pulses palpable 2+ bilaterally    MEDICATIONS  (STANDING):  amitriptyline 25 milliGRAM(s) Oral at bedtime  enoxaparin Injectable 40 milliGRAM(s) SubCutaneous daily  ergocalciferol 79438 Unit(s) Oral <User Schedule>  methylPREDNISolone sodium succinate Injectable 40 milliGRAM(s) IV Push daily  multivitamin/minerals 1 Tablet(s) Oral daily  pantoprazole    Tablet 40 milliGRAM(s) Oral before breakfast  propranolol 10 milliGRAM(s) Oral two times a day      TELEMETRY: 	  nsr  	  	  LABS:	 	      CARDIAC MARKERS ( 02 Oct 2020 07:12 )  <0.015 ng/mL / x     / x     / x     / x                                    13.0   12.93 )-----------( 213      ( 03 Oct 2020 06:44 )             38.9     10-03    141  |  107  |  12  ----------------------------<  103<H>  3.9   |  28  |  0.65    Ca    9.0      03 Oct 2020 06:44  Phos  3.4     10-03  Mg     2.4     10-03    TPro  7.2  /  Alb  3.2<L>  /  TBili  0.3  /  DBili  x   /  AST  37  /  ALT  57  /  AlkPhos  58  10-03      Lipid Profile: Cholesterol 290    HDL 72        TSH: Thyroid Stimulating Hormone, Serum: 0.58 uU/mL (10-02 @ 07:12)      	      < from: CT Head No Cont (10.02.20 @ 10:21) >    EXAM:  CT BRAIN                            PROCEDURE DATE:  10/02/2020          INTERPRETATION:  CLINICAL STATEMENT: Pain.    TECHNIQUE: CT of the head was performed without IV contrast.  RAPID artificial intelligence was utilized for the preliminary evaluation of intracranial hemorrhage.    COMPARISON: None.    FINDINGS:  There is no acute intracranial hemorrhage, parenchymal mass, or midline shift. There is no acute territorial infarct. There is no hydrocephalus. 3.0 x 2.3 cm arachnoid cyst noted in the left middle cranial fossa. Incidental note is made of atrophic optic nerves.. Partial empty sella    The cranium is intact. The visualized paranasal sinuses are well-aerated.    IMPRESSION:  No acute intracranial hemorrhage or acute territorial infarct.  If symptoms persist, follow-up MRI exam recommended.    < end of copied text >  < from: CT Abdomen and Pelvis No Cont (10.02.20 @ 10:21) >  EXAM:  CT ABDOMEN AND PELVIS                            PROCEDURE DATE:  10/02/2020          INTERPRETATION:  CLINICAL INDICATION: 45 years  Female with Rule out malignancy-pheo.    COMPARISON: None.    PROCEDURE:  CT of the Abdomen and Pelvis was performed without intravenous contrast.  Intravenous contrast: None.  Oral contrast: None.  Sagittal and coronal reformats were performed.    LIMITATIONS: Evaluation of the solid organs, vascular structures and GI tract is limited without oral and IV contrast. Patient motion.    FINDINGS:  LOWER CHEST: Within normal limits.    LIVER: Hepatic steatosis.  BILE DUCTS: Normal caliber.  GALLBLADDER: Within normal limits.  SPLEEN: Within normal limits.  PANCREAS: Within normal limits.  ADRENALS: Within normal limits. No adrenal mass.  KIDNEYS/URETERS: Within normal limits.    BLADDER: Within normal limits.  REPRODUCTIVE ORGANS: Unremarkable uterus. 3.3 cm right adnexal cyst.    BOWEL: No bowel obstruction. Appendix is normal.  PERITONEUM: No ascites.  VESSELS: Within normal limits.  RETROPERITONEUM/LYMPH NODES: No lymphadenopathy.  ABDOMINAL WALL: Small fat-containing umbilical hernia.  BONES: Within normal limits.    IMPRESSION:    3.3 cm right adnexal cyst. Recommend follow-up ultrasound in one to 2 menstrual cycles to ensure resolution.    Hepatic steatosis.

## 2020-10-03 NOTE — PROGRESS NOTE ADULT - ATTENDING COMMENTS
I counseled the patient about the medication to continue, and the importance of avoiding stressful situation, to help treat and prevent her headaches.

## 2020-10-04 ENCOUNTER — TRANSCRIPTION ENCOUNTER (OUTPATIENT)
Age: 45
End: 2020-10-04

## 2020-10-04 VITALS
RESPIRATION RATE: 18 BRPM | SYSTOLIC BLOOD PRESSURE: 127 MMHG | HEART RATE: 81 BPM | OXYGEN SATURATION: 96 % | TEMPERATURE: 98 F | DIASTOLIC BLOOD PRESSURE: 61 MMHG

## 2020-10-04 LAB
ALBUMIN SERPL ELPH-MCNC: 3.1 G/DL — LOW (ref 3.5–5)
ALP SERPL-CCNC: 53 U/L — SIGNIFICANT CHANGE UP (ref 40–120)
ALT FLD-CCNC: 70 U/L DA — HIGH (ref 10–60)
ANION GAP SERPL CALC-SCNC: 6 MMOL/L — SIGNIFICANT CHANGE UP (ref 5–17)
AST SERPL-CCNC: 50 U/L — HIGH (ref 10–40)
BILIRUB SERPL-MCNC: 0.3 MG/DL — SIGNIFICANT CHANGE UP (ref 0.2–1.2)
BUN SERPL-MCNC: 14 MG/DL — SIGNIFICANT CHANGE UP (ref 7–18)
CALCIUM SERPL-MCNC: 8.7 MG/DL — SIGNIFICANT CHANGE UP (ref 8.4–10.5)
CHLORIDE SERPL-SCNC: 106 MMOL/L — SIGNIFICANT CHANGE UP (ref 96–108)
CO2 SERPL-SCNC: 27 MMOL/L — SIGNIFICANT CHANGE UP (ref 22–31)
CREAT SERPL-MCNC: 0.76 MG/DL — SIGNIFICANT CHANGE UP (ref 0.5–1.3)
GAMMA INTERFERON BACKGROUND BLD IA-ACNC: 0.01 IU/ML — SIGNIFICANT CHANGE UP
GLUCOSE SERPL-MCNC: 91 MG/DL — SIGNIFICANT CHANGE UP (ref 70–99)
HCT VFR BLD CALC: 41.1 % — SIGNIFICANT CHANGE UP (ref 34.5–45)
HGB BLD-MCNC: 13 G/DL — SIGNIFICANT CHANGE UP (ref 11.5–15.5)
M TB IFN-G BLD-IMP: POSITIVE
M TB IFN-G CD4+ BCKGRND COR BLD-ACNC: 0.59 IU/ML — SIGNIFICANT CHANGE UP
M TB IFN-G CD4+CD8+ BCKGRND COR BLD-ACNC: 0.3 IU/ML — SIGNIFICANT CHANGE UP
MAGNESIUM SERPL-MCNC: 2.4 MG/DL — SIGNIFICANT CHANGE UP (ref 1.6–2.6)
MCHC RBC-ENTMCNC: 31 PG — SIGNIFICANT CHANGE UP (ref 27–34)
MCHC RBC-ENTMCNC: 31.6 GM/DL — LOW (ref 32–36)
MCV RBC AUTO: 97.9 FL — SIGNIFICANT CHANGE UP (ref 80–100)
NRBC # BLD: 0 /100 WBCS — SIGNIFICANT CHANGE UP (ref 0–0)
PHOSPHATE SERPL-MCNC: 3.4 MG/DL — SIGNIFICANT CHANGE UP (ref 2.5–4.5)
PLATELET # BLD AUTO: 187 K/UL — SIGNIFICANT CHANGE UP (ref 150–400)
POTASSIUM SERPL-MCNC: 3.9 MMOL/L — SIGNIFICANT CHANGE UP (ref 3.5–5.3)
POTASSIUM SERPL-SCNC: 3.9 MMOL/L — SIGNIFICANT CHANGE UP (ref 3.5–5.3)
PROT SERPL-MCNC: 6.9 G/DL — SIGNIFICANT CHANGE UP (ref 6–8.3)
QUANT TB PLUS MITOGEN MINUS NIL: 0.12 IU/ML — SIGNIFICANT CHANGE UP
RBC # BLD: 4.2 M/UL — SIGNIFICANT CHANGE UP (ref 3.8–5.2)
RBC # FLD: 14.2 % — SIGNIFICANT CHANGE UP (ref 10.3–14.5)
SODIUM SERPL-SCNC: 139 MMOL/L — SIGNIFICANT CHANGE UP (ref 135–145)
WBC # BLD: 10.39 K/UL — SIGNIFICANT CHANGE UP (ref 3.8–10.5)
WBC # FLD AUTO: 10.39 K/UL — SIGNIFICANT CHANGE UP (ref 3.8–10.5)

## 2020-10-04 RX ORDER — PYRIDOXINE HCL (VITAMIN B6) 100 MG
1 TABLET ORAL
Qty: 30 | Refills: 0
Start: 2020-10-04 | End: 2020-11-02

## 2020-10-04 RX ORDER — HEXAVITAMINS
1 TABLET ORAL
Qty: 30 | Refills: 0
Start: 2020-10-04 | End: 2020-11-02

## 2020-10-04 RX ORDER — PROPRANOLOL HCL 160 MG
1 CAPSULE, EXTENDED RELEASE 24HR ORAL
Qty: 60 | Refills: 0
Start: 2020-10-04 | End: 2020-11-02

## 2020-10-04 RX ORDER — ASPIRIN/CALCIUM CARB/MAGNESIUM 324 MG
1 TABLET ORAL
Qty: 30 | Refills: 0
Start: 2020-10-04 | End: 2020-11-02

## 2020-10-04 RX ORDER — ATORVASTATIN CALCIUM 80 MG/1
1 TABLET, FILM COATED ORAL
Qty: 30 | Refills: 0
Start: 2020-10-04 | End: 2020-11-02

## 2020-10-04 RX ORDER — ERGOCALCIFEROL 1.25 MG/1
1 CAPSULE ORAL
Qty: 4 | Refills: 0
Start: 2020-10-04 | End: 2020-11-02

## 2020-10-04 RX ORDER — ASPIRIN/CALCIUM CARB/MAGNESIUM 324 MG
2 TABLET ORAL
Qty: 30 | Refills: 0 | DISCHARGE
Start: 2020-10-04 | End: 2020-11-02

## 2020-10-04 RX ORDER — DIPHENHYDRAMINE HCL 50 MG
1 CAPSULE ORAL
Qty: 30 | Refills: 0
Start: 2020-10-04 | End: 2020-11-02

## 2020-10-04 RX ORDER — PANTOPRAZOLE SODIUM 20 MG/1
1 TABLET, DELAYED RELEASE ORAL
Qty: 30 | Refills: 0
Start: 2020-10-04 | End: 2020-11-02

## 2020-10-04 RX ORDER — AMITRIPTYLINE HCL 25 MG
1 TABLET ORAL
Qty: 30 | Refills: 0
Start: 2020-10-04 | End: 2020-11-02

## 2020-10-04 RX ADMIN — Medication 40 MILLIGRAM(S): at 05:28

## 2020-10-04 RX ADMIN — Medication 1 TABLET(S): at 11:12

## 2020-10-04 RX ADMIN — PANTOPRAZOLE SODIUM 40 MILLIGRAM(S): 20 TABLET, DELAYED RELEASE ORAL at 06:10

## 2020-10-04 RX ADMIN — ENOXAPARIN SODIUM 40 MILLIGRAM(S): 100 INJECTION SUBCUTANEOUS at 11:12

## 2020-10-04 NOTE — PROGRESS NOTE ADULT - REASON FOR ADMISSION
Sinus Tachycardia

## 2020-10-04 NOTE — PROGRESS NOTE ADULT - ASSESSMENT
1. SOB  R/O Underlying bronchospasm  CT chest noted.        No large central PE.   Covid negative.   Bronchodilators  O2 Supp PRN  PFTs as OP.   D/C planning     2. Lung Nodule  6mm RLL   Former smoker.   QuantiFeron TB gold positive.   Will need INH and B6 for Latent TB x 9mo.   F/U CT chest as OP in 6mo to assess stability.  F/U with pulm as OP in 1 - 2 wks.     3. Headache   CT head noted.   Neuro F/U     4. Palpitations  Tele monitoring   Echo noted.   Cardio F/U  Continue meds    5. Rash  R/O medication reaction  Continue steroids   Benadryl PRN for pruritis.
1. SOB  R/O Underlying bronchospasm  CT chest noted.        No large central PE.   Covid negative.   Bronchodilators  O2 Supp PRN  PFTs as OP.     2. Lung Nodule  6mm RLL   Former smoker.   Quantiferon TB gold  F/U CT chest as OP in 6mo to assess stability.    3. Headache   CT head noted.   Neuro eval  May need MRI     4. Palpitations  Tele monitoring   Echo  Cardio F/U  Continue meds    5. Rash  R/O medication reaction  Continue steroids   Benadryl PRN for pruritis.
Patient is a 45 year old female with no diagnosed medical history, presenting with generalized symptoms suggestive of anxiety, however due to unresolved tachycardia, will be admitted for further workup.
Patient is a 45 year old female with no diagnosed medical history, presenting with generalized symptoms suggestive of anxiety, however due to unresolved tachycardia, will be admitted for further workup.
Patient is a 45 year old female, obese, with PMHx H. Pylori, recently finished triple therapy September is presenting with chief complaint of headache, palpitations, and rash.  1.D/C Tele monitoring.  2.Quanterferon gold +-tx as per pulmonary.  3.NAPOLEON-p, f/u as outpatient.  4.Rash-benadryl,steroids.  5.Cont b blocker.  6.GI and DVT prophylaxis.
Patient is a 45 year old female, obese, with PMHx H. Pylori, recently finished triple therapy September is presenting with chief complaint of headache, palpitations, and rash.  1.Tele monitoring.  2.Echocardiogram.  3.Neurology eval noted  4.Rash-benadryl,steroids.  5.Cont b blocker.  6.NAPOLEON-p.  7GI and DVT prophylaxis.

## 2020-10-04 NOTE — DISCHARGE NOTE NURSING/CASE MANAGEMENT/SOCIAL WORK - PATIENT PORTAL LINK FT
You can access the FollowMyHealth Patient Portal offered by [facility] by registering at the following website: [email or web address]. By joining [employer]’s FollowMyHealth portal, you will also be able to view your health information using other applications (apps) compatible with our system.

## 2020-10-04 NOTE — PROGRESS NOTE ADULT - SUBJECTIVE AND OBJECTIVE BOX
Patient is a 45y old  Female who presents with a chief complaint of Sinus Tachycardia (03 Oct 2020 22:14)    pt seen in tele [ x ], reg med floor [   ], bed [ x ], chair at bedside [   ], a+o x3 [x  ], lethargic [  ],    nad [x       Allergies    No Known Allergies        Vitals    T(F): 97.5 (10-04-20 @ 04:35), Max: 98.2 (10-03-20 @ 15:10)  HR: 66 (10-04-20 @ 04:35) (57 - 82)  BP: 117/69 (10-04-20 @ 04:35) (114/59 - 131/70)  RR: 18 (10-04-20 @ 04:35) (18 - 19)  SpO2: 99% (10-04-20 @ 04:35) (97% - 100%)  Wt(kg): --  CAPILLARY BLOOD GLUCOSE          Labs                          13.0   12.93 )-----------( 213      ( 03 Oct 2020 06:44 )             38.9       10-03    141  |  107  |  12  ----------------------------<  103<H>  3.9   |  28  |  0.65    Ca    9.0      03 Oct 2020 06:44  Phos  3.4     10-03  Mg     2.4     10-03    TPro  7.2  /  Alb  3.2<L>  /  TBili  0.3  /  DBili  x   /  AST  37  /  ALT  57  /  AlkPhos  58  10-03      CARDIAC MARKERS ( 02 Oct 2020 07:12 )  <0.015 ng/mL / x     / x     / x     / x                Radiology Results      Meds    MEDICATIONS  (STANDING):  amitriptyline 25 milliGRAM(s) Oral at bedtime  enoxaparin Injectable 40 milliGRAM(s) SubCutaneous daily  ergocalciferol 09844 Unit(s) Oral <User Schedule>  methylPREDNISolone sodium succinate Injectable 40 milliGRAM(s) IV Push daily  multivitamin/minerals 1 Tablet(s) Oral daily  pantoprazole    Tablet 40 milliGRAM(s) Oral before breakfast  propranolol 10 milliGRAM(s) Oral two times a day      MEDICATIONS  (PRN):  acetaminophen   Tablet .. 650 milliGRAM(s) Oral every 6 hours PRN Moderate Pain (4 - 6)  diphenhydrAMINE 25 milliGRAM(s) Oral every 6 hours PRN Rash and/or Itching  LORazepam   Injectable 2 milliGRAM(s) IV Push every 4 hours PRN CIWA>7      Physical Exam    Neuro :  no focal deficits  Respiratory: CTA B/L  CV: RRR, S1S2, no murmurs,   Abdominal: Soft, NT, ND +BS,  Extremities: No edema, + peripheral pulses  Skin : mild maculopapular rash on extremities and chest        ASSESSMENT    Tachycardia  possible 2nd to panic attack,    r/o acs,   lung nodule,   headache   h/o allergic rash,  anxiety,   Ectopic pregnancy  History of appendectomy      PLAN      con tele,   acs protocol,   cont propanol 10mg bid,   hr improved  cont aspirin, statin,   ce x1 neg noted above   cardio f/u  f/u echo  pulm f/u  f/u  quantiferon tb gold,   PFTs as OP.  f/u paul, rf, anti dsdna,   ct head with No acute intracranial hemorrhage or acute territorial infarct noted above  neuro cons noted  cont amitriptyline 25mg PO daily for managing chronic daily headache  Monitor for arrhythmia or other cardiac abnormalities while patient is on amitriptyline (patient's palpitations seem to be temporary and only associated with anxiety attack)  Routine follow-up in Neurology clinic for headache management  ct abd pelv with 3.3 cm right adnexal cyst. Recommend follow-up ultrasound in one to 2 menstrual cycles to ensure resolution. Hepatic steatosis noted above.  cont benadryl prn pruritis  cont current meds             Patient is a 45y old  Female who presents with a chief complaint of Sinus Tachycardia (03 Oct 2020 22:14)    pt seen in tele [ x ], reg med floor [   ], bed [ x ], chair at bedside [   ], a+o x3 [x  ], lethargic [  ],    nad [x       Allergies    No Known Allergies        Vitals    T(F): 97.5 (10-04-20 @ 04:35), Max: 98.2 (10-03-20 @ 15:10)  HR: 66 (10-04-20 @ 04:35) (57 - 82)  BP: 117/69 (10-04-20 @ 04:35) (114/59 - 131/70)  RR: 18 (10-04-20 @ 04:35) (18 - 19)  SpO2: 99% (10-04-20 @ 04:35) (97% - 100%)  Wt(kg): --  CAPILLARY BLOOD GLUCOSE          Labs                          13.0   12.93 )-----------( 213      ( 03 Oct 2020 06:44 )             38.9       10-03    141  |  107  |  12  ----------------------------<  103<H>  3.9   |  28  |  0.65    Ca    9.0      03 Oct 2020 06:44  Phos  3.4     10-03  Mg     2.4     10-03    TPro  7.2  /  Alb  3.2<L>  /  TBili  0.3  /  DBili  x   /  AST  37  /  ALT  57  /  AlkPhos  58  10-03    Quantiferon Plus TB (10.02.20 @ 09:44)   Quantiferon TB Plus: POSITIVE: Test repeated.     CARDIAC MARKERS ( 02 Oct 2020 07:12 )  <0.015 ng/mL / x     / x     / x     / x          < from: Transthoracic Echocardiogram (10.03.20 @ 11:00) >  CONCLUSIONS:  1. Normal mitral valve. Trivialmitral regurgitation.  2. No evidence of vegetation on the aortic valve. No aortic  stenosis. No aortic valve regurgitation seen.  3. Normal aortic root.  4. Normal left atrium.  5. Normal left ventricular internal dimensions and wall  thicknesses.  6. Normal Left Ventricular Systolic Function,  (EF = 66% by  biplane)  7. Normal diastolic function.  8. Normal right atrium.  9. Normal right ventricular size and systolic function  (TAPSE 1.7 cm).  10. Normal tricuspid valve. Trace tricuspid regurgitation.  11. Pulmonic valve not well seen. Trace pulmonic  insufficiency is noted.  12. No pericardial effusion.    < end of copied text >        Radiology Results      Meds    MEDICATIONS  (STANDING):  amitriptyline 25 milliGRAM(s) Oral at bedtime  enoxaparin Injectable 40 milliGRAM(s) SubCutaneous daily  ergocalciferol 67111 Unit(s) Oral <User Schedule>  methylPREDNISolone sodium succinate Injectable 40 milliGRAM(s) IV Push daily  multivitamin/minerals 1 Tablet(s) Oral daily  pantoprazole    Tablet 40 milliGRAM(s) Oral before breakfast  propranolol 10 milliGRAM(s) Oral two times a day      MEDICATIONS  (PRN):  acetaminophen   Tablet .. 650 milliGRAM(s) Oral every 6 hours PRN Moderate Pain (4 - 6)  diphenhydrAMINE 25 milliGRAM(s) Oral every 6 hours PRN Rash and/or Itching  LORazepam   Injectable 2 milliGRAM(s) IV Push every 4 hours PRN CIWA>7      Physical Exam    Neuro :  no focal deficits  Respiratory: CTA B/L  CV: RRR, S1S2, no murmurs,   Abdominal: Soft, NT, ND +BS,  Extremities: No edema, + peripheral pulses  Skin : mild maculopapular rash on extremities and chest        ASSESSMENT    Tachycardia  possible 2nd to panic attack,    r/o acs,   lung nodule,   headache   h/o allergic rash,  anxiety,   Ectopic pregnancy  History of appendectomy      PLAN      con tele,   acs protocol,   cont propanol 10mg bid,   hr improved  cont aspirin, statin,   ce x1 neg noted above   cardio f/u  f/u echo  pulm f/u  quantiferon tb gold,   pt to start inh and vit b6 x 6 months  f/u lft in 1 month  PFTs as OP.  f/u paul, rf, anti dsdna,   ct head with No acute intracranial hemorrhage or acute territorial infarct noted above  neuro cons noted  cont amitriptyline 25mg PO daily for managing chronic daily headache  Monitor for arrhythmia or other cardiac abnormalities while patient is on amitriptyline (patient's palpitations seem to be temporary and only associated with anxiety attack)  Routine follow-up in Neurology clinic for headache management  ct abd pelv with 3.3 cm right adnexal cyst. Recommend follow-up ultrasound in one to 2 menstrual cycles to ensure resolution. Hepatic steatosis noted above.  cont benadryl prn pruritis  cont current meds  pt stable for d/c

## 2020-10-04 NOTE — PROGRESS NOTE ADULT - SUBJECTIVE AND OBJECTIVE BOX
Pt is awake, alert, lying in bed in NAD. Feels well. Denies CP, Cough, or SOB today. For D/C planning.     INTERVAL HPI/OVERNIGHT EVENTS:      VITAL SIGNS:  T(F): 98.1 (10-04-20 @ 11:34)  HR: 81 (10-04-20 @ 11:34)  BP: 127/61 (10-04-20 @ 11:34)  RR: 18 (10-04-20 @ 11:34)  SpO2: 96% (10-04-20 @ 11:34)  Wt(kg): --  I&O's Detail    03 Oct 2020 07:01  -  04 Oct 2020 07:00  --------------------------------------------------------  IN:    Oral Fluid: 75 mL  Total IN: 75 mL    OUT:  Total OUT: 0 mL    Total NET: 75 mL              REVIEW OF SYSTEMS:    CONSTITUTIONAL:  No fevers, chills, sweats    HEENT:  Eyes:  No diplopia or blurred vision. ENT:  No earache, sore throat or runny nose.    CARDIOVASCULAR:  No pressure, squeezing, tightness, or heaviness about the chest; no palpitations.    RESPIRATORY:  Per HPI    GASTROINTESTINAL:  No abdominal pain, nausea, vomiting or diarrhea.    GENITOURINARY:  No dysuria, frequency or urgency.    NEUROLOGIC:  No paresthesias, fasciculations, seizures or weakness.    PSYCHIATRIC:  No disorder of thought or mood.      PHYSICAL EXAM:    Constitutional: Well developed and nourished  Eyes:Perrla  ENMT: normal  Neck:supple  Respiratory: good air entry  Cardiovascular: S1 S2 regular  Gastrointestinal: Soft, Non tender  Extremities: No edema  Vascular:normal  Neurological:Awake, alert,Ox3  Musculoskeletal:Normal      MEDICATIONS  (STANDING):  amitriptyline 25 milliGRAM(s) Oral at bedtime  enoxaparin Injectable 40 milliGRAM(s) SubCutaneous daily  ergocalciferol 66875 Unit(s) Oral <User Schedule>  methylPREDNISolone sodium succinate Injectable 40 milliGRAM(s) IV Push daily  multivitamin/minerals 1 Tablet(s) Oral daily  pantoprazole    Tablet 40 milliGRAM(s) Oral before breakfast  propranolol 10 milliGRAM(s) Oral two times a day    MEDICATIONS  (PRN):  acetaminophen   Tablet .. 650 milliGRAM(s) Oral every 6 hours PRN Moderate Pain (4 - 6)  diphenhydrAMINE 25 milliGRAM(s) Oral every 6 hours PRN Rash and/or Itching  LORazepam   Injectable 2 milliGRAM(s) IV Push every 4 hours PRN CIWA>7      Allergies    No Known Allergies    Intolerances        LABS:                        13.0   10.39 )-----------( 187      ( 04 Oct 2020 08:34 )             41.1     10-04    139  |  106  |  14  ----------------------------<  91  3.9   |  27  |  0.76    Ca    8.7      04 Oct 2020 08:34  Phos  3.4     10-04  Mg     2.4     10-04    TPro  6.9  /  Alb  3.1<L>  /  TBili  0.3  /  DBili  x   /  AST  50<H>  /  ALT  70<H>  /  AlkPhos  53  10-04          Quantiferon Plus TB (10.02.20 @ 09:44)   Quantiferon TB Plus: POSITIVE: Test repeated.     CAPILLARY BLOOD GLUCOSE            RADIOLOGY & ADDITIONAL TESTS:    CXR:    Ct scan chest:    ekg;    echo:  < from: Transthoracic Echocardiogram (10.03.20 @ 11:00) >  CONCLUSIONS:  1. Normal mitral valve. Trivialmitral regurgitation.  2. No evidence of vegetation on the aortic valve. No aortic  stenosis. No aortic valve regurgitation seen.  3. Normal aortic root.  4. Normal left atrium.  5. Normal left ventricular internal dimensions and wall  thicknesses.  6. Normal Left Ventricular Systolic Function,  (EF = 66% by  biplane)  7. Normal diastolic function.  8. Normal right atrium.  9. Normal right ventricular size and systolic function  (TAPSE 1.7 cm).  10. Normal tricuspid valve. Trace tricuspid regurgitation.  11. Pulmonic valve not well seen. Trace pulmonic  insufficiency is noted.  12. No pericardial effusion.    < end of copied text >

## 2020-10-04 NOTE — PROGRESS NOTE ADULT - PROVIDER SPECIALTY LIST ADULT
Cardiology
Cardiology
Internal Medicine
Neurology
Pulmonology
Pulmonology

## 2020-10-04 NOTE — PROGRESS NOTE ADULT - SUBJECTIVE AND OBJECTIVE BOX
CHIEF COMPLAINT:Patient is a 45y old  Female who presents with a chief complaint of Sinus Tachycardia .Pt appears comfortable.    	  REVIEW OF SYSTEMS:  CONSTITUTIONAL: No fever, weight loss, or fatigue  EYES: No eye pain, visual disturbances, or discharge  ENT:  No difficulty hearing, tinnitus, vertigo; No sinus or throat pain  NECK: No pain or stiffness  RESPIRATORY: No cough, wheezing, chills or hemoptysis; No Shortness of Breath  CARDIOVASCULAR: No chest pain, palpitations, passing out, dizziness, or leg swelling  GASTROINTESTINAL: No abdominal or epigastric pain. No nausea, vomiting, or hematemesis; No diarrhea or constipation. No melena or hematochezia.  GENITOURINARY: No dysuria, frequency, hematuria, or incontinence  NEUROLOGICAL: No headaches, memory loss, loss of strength, numbness, or tremors  SKIN: No itching, burning, rashes, or lesions   LYMPH Nodes: No enlarged glands  ENDOCRINE: No heat or cold intolerance; No hair loss  MUSCULOSKELETAL: No joint pain or swelling; No muscle, back, or extremity pain  PSYCHIATRIC: No depression, anxiety, mood swings, or difficulty sleeping  HEME/LYMPH: No easy bruising, or bleeding gums  ALLERGY AND IMMUNOLOGIC: No hives or eczema	        PHYSICAL EXAM:  T(C): 36.8 (10-04-20 @ 07:37), Max: 36.8 (10-03-20 @ 15:10)  HR: 62 (10-04-20 @ 07:37) (57 - 82)  BP: 112/55 (10-04-20 @ 07:37) (112/55 - 131/70)  RR: 18 (10-04-20 @ 07:37) (18 - 19)  SpO2: 96% (10-04-20 @ 07:37) (96% - 100%)  Wt(kg): --  I&O's Summary    03 Oct 2020 07:01  -  04 Oct 2020 07:00  --------------------------------------------------------  IN: 75 mL / OUT: 0 mL / NET: 75 mL        Appearance: Normal	  HEENT:   Normal oral mucosa, PERRL, EOMI	  Lymphatic: No lymphadenopathy  Cardiovascular: Normal S1 S2, No JVD, No murmurs, No edema  Respiratory: Lungs clear to auscultation	  Psychiatry: A & O x 3, Mood & affect appropriate  Gastrointestinal:  Soft, Non-tender, + BS	  Skin: No rashes, No ecchymoses, No cyanosis	  Neurologic: Non-focal  Extremities: Normal range of motion, No clubbing, cyanosis or edema  Vascular: Peripheral pulses palpable 2+ bilaterally    MEDICATIONS  (STANDING):  amitriptyline 25 milliGRAM(s) Oral at bedtime  enoxaparin Injectable 40 milliGRAM(s) SubCutaneous daily  ergocalciferol 62546 Unit(s) Oral <User Schedule>  methylPREDNISolone sodium succinate Injectable 40 milliGRAM(s) IV Push daily  multivitamin/minerals 1 Tablet(s) Oral daily  pantoprazole    Tablet 40 milliGRAM(s) Oral before breakfast  propranolol 10 milliGRAM(s) Oral two times a day      	  	  LABS:	 	                         13.0   10.39 )-----------( 187      ( 04 Oct 2020 08:34 )             41.1     10-04    139  |  106  |  14  ----------------------------<  91  3.9   |  27  |  0.76    Ca    8.7      04 Oct 2020 08:34  Phos  3.4     10-04  Mg     2.4     10-04    TPro  6.9  /  Alb  3.1<L>  /  TBili  0.3  /  DBili  x   /  AST  50<H>  /  ALT  70<H>  /  AlkPhos  53  10-04      Lipid Profile: Cholesterol 290    HDL 72        TSH: Thyroid Stimulating Hormone, Serum: 0.58 uU/mL (10-02 @ 07:12)      	   Transthoracic Echocardiogram (10.03.20 @ 11:00) >  OBSERVATIONS:  Mitral Valve: Normal mitral valve. Trivial mitral  regurgitation.  Aortic Root: Normal aortic root.  Aortic Valve: No evidence of vegetation on the aortic  valve. No aortic stenosis. No aortic valve regurgitation  seen.  Left Atrium: Normal left atrium.  LA volume index = 26  cc/m2.  Left Ventricle: Normal Left Ventricular Systolic Function,  (EF = 66% by biplane) No regional wall motion  abnormalities. Normal left ventricular internal dimensions  and wall thicknesses. Normal diastolic function.  Right Heart: Normal right atrium. Normal right ventricular  size and systolic function (TAPSE 1.7 cm). Normal tricuspid  valve. Trace tricuspid regurgitation. Pulmonic valve not  well seen. Trace pulmonic insufficiency is noted.  Pericardium/PleuraNo pericardial effusion.  Hemodynamic: Insufficient tricuspid regurgitation jet to  allow calculation of RVSP.    Quantiferon TB Plus: POSITIVE: Test repeated.

## 2020-10-05 LAB
CK MB BLD-MCNC: HIGH TITER
COXSACKIE TYPE A-24: HIGH TITER
CV A24 IGG TITR SER IF: HIGH TITER
CV A7 AB SER-ACNC: HIGH TITER
CV A9 AB TITR FLD: HIGH TITER
DSDNA AB SER-ACNC: <12 IU/ML — SIGNIFICANT CHANGE UP
ESTROGEN SERPL-MCNC: 57 PG/ML — SIGNIFICANT CHANGE UP

## 2020-10-06 ENCOUNTER — LABORATORY RESULT (OUTPATIENT)
Age: 45
End: 2020-10-06

## 2020-10-06 ENCOUNTER — APPOINTMENT (OUTPATIENT)
Dept: GASTROENTEROLOGY | Facility: CLINIC | Age: 45
End: 2020-10-06
Payer: MEDICAID

## 2020-10-06 DIAGNOSIS — A04.8 OTHER SPECIFIED BACTERIAL INTESTINAL INFECTIONS: ICD-10-CM

## 2020-10-06 LAB
5OH-INDOLEACETATE UR-MCNC: 2.2 MG/G CREAT — SIGNIFICANT CHANGE UP
CREATININE, RNDM UR: 144 MG/DL — SIGNIFICANT CHANGE UP (ref 20–275)
CV B1 AB TITR FLD: NEGATIVE — SIGNIFICANT CHANGE UP
CV B2 AB TITR FLD: NEGATIVE — SIGNIFICANT CHANGE UP
CV B3 AB TITR FLD: NEGATIVE — SIGNIFICANT CHANGE UP
CV B4 AB TITR FLD: NEGATIVE — SIGNIFICANT CHANGE UP
CV B5 AB TITR FLD: NEGATIVE — SIGNIFICANT CHANGE UP
CV B6 AB TITR FLD: NEGATIVE — SIGNIFICANT CHANGE UP
METANEPH UR-MCNC: SIGNIFICANT CHANGE UP

## 2020-10-06 PROCEDURE — 83014 H PYLORI DRUG ADMIN: CPT

## 2020-10-11 LAB
METANEPHRINE, PL: 18.7 PG/ML — SIGNIFICANT CHANGE UP (ref 0–88)
NORMETANEPHRINE, PL: 59.9 PG/ML — SIGNIFICANT CHANGE UP (ref 0–125.8)

## 2020-10-29 PROCEDURE — 83001 ASSAY OF GONADOTROPIN (FSH): CPT

## 2020-10-29 PROCEDURE — 83036 HEMOGLOBIN GLYCOSYLATED A1C: CPT

## 2020-10-29 PROCEDURE — 83690 ASSAY OF LIPASE: CPT

## 2020-10-29 PROCEDURE — 82728 ASSAY OF FERRITIN: CPT

## 2020-10-29 PROCEDURE — 85610 PROTHROMBIN TIME: CPT

## 2020-10-29 PROCEDURE — 80048 BASIC METABOLIC PNL TOTAL CA: CPT

## 2020-10-29 PROCEDURE — 84439 ASSAY OF FREE THYROXINE: CPT

## 2020-10-29 PROCEDURE — 83540 ASSAY OF IRON: CPT

## 2020-10-29 PROCEDURE — 71275 CT ANGIOGRAPHY CHEST: CPT

## 2020-10-29 PROCEDURE — 93005 ELECTROCARDIOGRAM TRACING: CPT

## 2020-10-29 PROCEDURE — 86780 TREPONEMA PALLIDUM: CPT

## 2020-10-29 PROCEDURE — 82746 ASSAY OF FOLIC ACID SERUM: CPT

## 2020-10-29 PROCEDURE — 84484 ASSAY OF TROPONIN QUANT: CPT

## 2020-10-29 PROCEDURE — 36415 COLL VENOUS BLD VENIPUNCTURE: CPT

## 2020-10-29 PROCEDURE — 85027 COMPLETE CBC AUTOMATED: CPT

## 2020-10-29 PROCEDURE — 85730 THROMBOPLASTIN TIME PARTIAL: CPT

## 2020-10-29 PROCEDURE — 83835 ASSAY OF METANEPHRINES: CPT

## 2020-10-29 PROCEDURE — 86225 DNA ANTIBODY NATIVE: CPT

## 2020-10-29 PROCEDURE — 85652 RBC SED RATE AUTOMATED: CPT

## 2020-10-29 PROCEDURE — 96375 TX/PRO/DX INJ NEW DRUG ADDON: CPT

## 2020-10-29 PROCEDURE — 83735 ASSAY OF MAGNESIUM: CPT

## 2020-10-29 PROCEDURE — 80076 HEPATIC FUNCTION PANEL: CPT

## 2020-10-29 PROCEDURE — 82607 VITAMIN B-12: CPT

## 2020-10-29 PROCEDURE — 86140 C-REACTIVE PROTEIN: CPT

## 2020-10-29 PROCEDURE — 70450 CT HEAD/BRAIN W/O DYE: CPT

## 2020-10-29 PROCEDURE — 86038 ANTINUCLEAR ANTIBODIES: CPT

## 2020-10-29 PROCEDURE — 86431 RHEUMATOID FACTOR QUANT: CPT

## 2020-10-29 PROCEDURE — 97162 PT EVAL MOD COMPLEX 30 MIN: CPT

## 2020-10-29 PROCEDURE — 93306 TTE W/DOPPLER COMPLETE: CPT

## 2020-10-29 PROCEDURE — 87635 SARS-COV-2 COVID-19 AMP PRB: CPT

## 2020-10-29 PROCEDURE — 83497 ASSAY OF 5-HIAA: CPT

## 2020-10-29 PROCEDURE — 84443 ASSAY THYROID STIM HORMONE: CPT

## 2020-10-29 PROCEDURE — 74176 CT ABD & PELVIS W/O CONTRAST: CPT

## 2020-10-29 PROCEDURE — 86480 TB TEST CELL IMMUN MEASURE: CPT

## 2020-10-29 PROCEDURE — 82550 ASSAY OF CK (CPK): CPT

## 2020-10-29 PROCEDURE — 83550 IRON BINDING TEST: CPT

## 2020-10-29 PROCEDURE — 84100 ASSAY OF PHOSPHORUS: CPT

## 2020-10-29 PROCEDURE — 99285 EMERGENCY DEPT VISIT HI MDM: CPT | Mod: 25

## 2020-10-29 PROCEDURE — 84436 ASSAY OF TOTAL THYROXINE: CPT

## 2020-10-29 PROCEDURE — 80053 COMPREHEN METABOLIC PANEL: CPT

## 2020-10-29 PROCEDURE — 82306 VITAMIN D 25 HYDROXY: CPT

## 2020-10-29 PROCEDURE — 85025 COMPLETE CBC W/AUTO DIFF WBC: CPT

## 2020-10-29 PROCEDURE — 86658 ENTEROVIRUS ANTIBODY: CPT

## 2020-10-29 PROCEDURE — 84702 CHORIONIC GONADOTROPIN TEST: CPT

## 2020-10-29 PROCEDURE — 86769 SARS-COV-2 COVID-19 ANTIBODY: CPT

## 2020-10-29 PROCEDURE — 80061 LIPID PANEL: CPT

## 2020-10-29 PROCEDURE — 96365 THER/PROPH/DIAG IV INF INIT: CPT

## 2020-10-29 PROCEDURE — 80307 DRUG TEST PRSMV CHEM ANLYZR: CPT

## 2020-10-29 PROCEDURE — 82672 ASSAY OF ESTROGEN: CPT

## 2020-10-29 PROCEDURE — 80074 ACUTE HEPATITIS PANEL: CPT

## 2020-11-03 PROBLEM — O00.90 UNSPECIFIED ECTOPIC PREGNANCY WITHOUT INTRAUTERINE PREGNANCY: Chronic | Status: ACTIVE | Noted: 2020-10-01

## 2020-12-24 ENCOUNTER — APPOINTMENT (OUTPATIENT)
Dept: NEUROLOGY | Facility: CLINIC | Age: 45
End: 2020-12-24
Payer: MEDICAID

## 2020-12-24 VITALS
HEART RATE: 72 BPM | SYSTOLIC BLOOD PRESSURE: 115 MMHG | WEIGHT: 250 LBS | BODY MASS INDEX: 40.18 KG/M2 | DIASTOLIC BLOOD PRESSURE: 73 MMHG | HEIGHT: 66 IN

## 2020-12-24 VITALS — TEMPERATURE: 97.7 F

## 2020-12-24 DIAGNOSIS — A15.9 RESPIRATORY TUBERCULOSIS UNSPECIFIED: ICD-10-CM

## 2020-12-24 PROCEDURE — 99072 ADDL SUPL MATRL&STAF TM PHE: CPT

## 2020-12-24 PROCEDURE — 99215 OFFICE O/P EST HI 40 MIN: CPT

## 2020-12-24 RX ORDER — LANSOPRAZOLE, AMOXICILLIN, CLARITHROMYCIN 30-500-500
KIT ORAL
Qty: 1 | Refills: 0 | Status: DISCONTINUED | COMMUNITY
Start: 2020-08-18 | End: 2020-12-24

## 2020-12-24 RX ORDER — SUCRALFATE 1 G/1
1 TABLET ORAL
Refills: 0 | Status: DISCONTINUED | COMMUNITY
End: 2020-12-24

## 2020-12-24 RX ORDER — SUCRALFATE 1 G/10ML
1 SUSPENSION ORAL 4 TIMES DAILY
Qty: 3 | Refills: 3 | Status: DISCONTINUED | COMMUNITY
Start: 2020-06-16 | End: 2020-12-24

## 2020-12-24 RX ORDER — FAMOTIDINE 20 MG/1
20 TABLET, FILM COATED ORAL
Refills: 0 | Status: DISCONTINUED | COMMUNITY
End: 2020-12-24

## 2021-01-05 NOTE — HISTORY OF PRESENT ILLNESS
[FreeTextEntry1] : Interview conducted in German, with the assistance of the patient's . This is a 45-year-old right-handed woman who was admitted to [facility] during October 1-4, 2020 for sinus tachycardia and bitemporal headache. She has had decreased intensity of headache to 4-5/10 since being discharged from the hospital with amitriptyline 25mg PO QHS. She has also been taking propranolol 10mg PO BID as recommended by the Cardiology consultant for management of sinus tachycardia. 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 PCP: Dr. Erica Torres

## 2021-01-05 NOTE — DATA REVIEWED
[de-identified] : CT Head (10/2/20):\par - No acute intracranial abnormalities\par - Left middle cranial fossa arachnoid cyst\par \par Labs (10/4/20):\par - CMP notable for ALT 70 <H> and AST 50 <H>\par - CBC, Phos normal\par

## 2021-01-05 NOTE — PHYSICAL EXAM
[General Appearance - Alert] : alert [General Appearance - In No Acute Distress] : in no acute distress [Oriented To Time, Place, And Person] : oriented to person, place, and time [Impaired Insight] : insight and judgment were intact [Affect] : the affect was normal [Person] : oriented to person [Place] : oriented to place [Time] : oriented to time [Concentration Intact] : normal concentrating ability [Visual Intact] : visual attention was ~T not ~L decreased [Fluency] : fluency intact [Comprehension] : comprehension intact [Cranial Nerves Optic (II)] : visual acuity intact bilaterally,  visual fields full to confrontation, pupils equal round and reactive to light [Cranial Nerves Oculomotor (III)] : extraocular motion intact [Cranial Nerves Trigeminal (V)] : facial sensation intact symmetrically [Cranial Nerves Facial (VII)] : face symmetrical [Cranial Nerves Vestibulocochlear (VIII)] : hearing was intact bilaterally [Cranial Nerves Glossopharyngeal (IX)] : tongue and palate midline [Cranial Nerves Accessory (XI - Cranial And Spinal)] : head turning and shoulder shrug symmetric [Cranial Nerves Hypoglossal (XII)] : there was no tongue deviation with protrusion [Motor Strength] : muscle strength was normal in all four extremities [No Muscle Atrophy] : normal bulk in all four extremities [Motor Handedness Right-Handed] : the patient is right hand dominant [3] : knee extension 3/5 [4] : ankle plantar flexion 4/5 [5] : ankle plantar flexion 5/5 [Sensation Tactile Decrease] : light touch was intact [Balance] : balance was intact [2+] : Ankle jerk left 2+ [Sclera] : the sclera and conjunctiva were normal [PERRL With Normal Accommodation] : pupils were equal in size, round, reactive to light, with normal accommodation [Extraocular Movements] : extraocular movements were intact [Outer Ear] : the ears and nose were normal in appearance [Oropharynx] : the oropharynx was normal [Neck Appearance] : the appearance of the neck was normal [Auscultation Breath Sounds / Voice Sounds] : lungs were clear to auscultation bilaterally [Heart Rate And Rhythm] : heart rate was normal and rhythm regular [Heart Sounds] : normal S1 and S2 [Arterial Pulses Carotid] : carotid pulses were normal with no bruits [Full Pulse] : the pedal pulses are present [Edema] : there was no peripheral edema [Abdomen Soft] : soft [Abdomen Tenderness] : non-tender [No CVA Tenderness] : no ~M costovertebral angle tenderness [No Spinal Tenderness] : no spinal tenderness [Abnormal Walk] : normal gait [Nail Clubbing] : no clubbing  or cyanosis of the fingernails [Musculoskeletal - Swelling] : no joint swelling seen [Motor Tone] : muscle strength and tone were normal [Skin Color & Pigmentation] : normal skin color and pigmentation [Skin Turgor] : normal skin turgor [] : no rash [Paresis Pronator Drift Right-Sided] : no pronator drift on the right [Motor Strength Upper Extremities Bilaterally] : strength was normal in both upper extremities [Past-pointing] : there was no past-pointing [Tremor] : no tremor present [Plantar Reflex Right Only] : normal on the right [Plantar Reflex Left Only] : normal on the left [___] : absent on the right [___] : absent on the left

## 2021-01-05 NOTE — REASON FOR VISIT
[Post Hospitalization] : a post hospitalization visit [Other: _____] : [unfilled] [FreeTextEntry1] : Headache

## 2021-01-05 NOTE — ASSESSMENT
[FreeTextEntry1] : 45 RHF with temporal headache, likely cluster headache or migraine, also with right leg pain and recent diagnoses of sinus tachycardia and tuberculosis.

## 2021-02-22 ENCOUNTER — APPOINTMENT (OUTPATIENT)
Dept: NEUROLOGY | Facility: CLINIC | Age: 46
End: 2021-02-22
Payer: MEDICAID

## 2021-02-22 PROCEDURE — 99072 ADDL SUPL MATRL&STAF TM PHE: CPT

## 2021-02-22 PROCEDURE — 95909 NRV CNDJ TST 5-6 STUDIES: CPT

## 2021-02-22 PROCEDURE — 95886 MUSC TEST DONE W/N TEST COMP: CPT

## 2021-03-08 ENCOUNTER — RX RENEWAL (OUTPATIENT)
Age: 46
End: 2021-03-08

## 2021-03-25 ENCOUNTER — APPOINTMENT (OUTPATIENT)
Dept: NEUROLOGY | Facility: CLINIC | Age: 46
End: 2021-03-25
Payer: MEDICAID

## 2021-03-25 VITALS
DIASTOLIC BLOOD PRESSURE: 76 MMHG | HEIGHT: 66 IN | BODY MASS INDEX: 43.39 KG/M2 | SYSTOLIC BLOOD PRESSURE: 118 MMHG | WEIGHT: 270 LBS | HEART RATE: 68 BPM

## 2021-03-25 VITALS — TEMPERATURE: 97 F

## 2021-03-25 PROCEDURE — 99072 ADDL SUPL MATRL&STAF TM PHE: CPT

## 2021-03-25 PROCEDURE — 99215 OFFICE O/P EST HI 40 MIN: CPT

## 2021-03-25 RX ORDER — AMITRIPTYLINE HYDROCHLORIDE 25 MG/1
25 TABLET, FILM COATED ORAL
Qty: 30 | Refills: 0 | Status: DISCONTINUED | COMMUNITY
Start: 2020-12-24 | End: 2021-03-25

## 2021-03-25 RX ORDER — ATORVASTATIN CALCIUM 40 MG/1
40 TABLET, FILM COATED ORAL DAILY
Qty: 30 | Refills: 0 | Status: DISCONTINUED | COMMUNITY
Start: 2020-12-24 | End: 2021-03-25

## 2021-03-25 RX ORDER — PROPRANOLOL HYDROCHLORIDE 10 MG/1
10 TABLET ORAL
Qty: 180 | Refills: 1 | Status: DISCONTINUED | COMMUNITY
Start: 2020-12-24 | End: 2021-03-25

## 2021-03-25 RX ORDER — PANTOPRAZOLE 40 MG/1
40 TABLET, DELAYED RELEASE ORAL
Qty: 30 | Refills: 0 | Status: DISCONTINUED | COMMUNITY
Start: 2020-06-16 | End: 2021-03-25

## 2021-03-25 NOTE — END OF VISIT
[facility] Internal Medicine Progress Note      Subjective   No hematuria on CBI.  No abdominal pain, nausea or vomiting.  Feeling better.  Able to ambulate better.  No major overnight event.  No cardiac, respiratory or GI symptoms.  Low potassium noted.    Review of systems  Negative for all 10 systems except per subjective    Objective     I/O's    Intake/Output Summary (Last 24 hours) at 12/26/2020 1725  Last data filed at 12/26/2020 1616  Gross per 24 hour   Intake 38859.4 ml   Output 71076 ml   Net -28319.6 ml       Last Recorded Vitals  Blood pressure 97/57, pulse 72, temperature 98.6 °F (37 °C), temperature source Oral, resp. rate 16, height 5' 7.01\" (1.702 m), weight 49 kg (108 lb 0.4 oz), SpO2 98 %.  Body mass index is 16.92 kg/m².    Physical Exam  Physical Exam   Vitals signs reviewed.   Constitutional:       General: She is not in acute distress.     Appearance: She is underweight.    HENT:      Nose: Nose normal. No congestion or rhinorrhea.      Mouth/Throat:      Mouth: Mucous membranes are moist.      Pharynx: Oropharynx is clear. No oropharyngeal exudate or posterior oropharyngeal erythema.   Eyes:         Conjunctiva/sclera: Conjunctivae normal.   Cardiovascular:      Rate and Rhythm: Normal rate and regular rhythm.      Heart sounds: Normal heart sounds.      Comments:   Pulmonary:      Effort: Pulmonary effort is normal. No respiratory distress.      Breath sounds: Normal breath sounds.      Comments:   Abdominal:      General: Abdomen is flat. Bowel sounds are normal.      Palpations: Abdomen is soft.      Tenderness: There is  . There is no right CVA tenderness, left CVA tenderness, guarding or rebound.      Comments: Abdominal ostomy. No suprapubic tenderness    Bates catheter in place, CBI on, hematuria present  Musculoskeletal:       .   Skin:     General: Skin is warm.      Coloration: Skin is pale. Skin is not jaundiced.      Findings: No erythema or rash.   Neurological:       General: No focal deficit present.      Mental Status: She is oriented to person, place, and time.     Cranial Nerves: No cranial nerve deficit.     Recen  Recent Results (from the past 24 hour(s))   Comprehensive Metabolic Panel    Collection Time: 12/26/20  3:10 AM   Result Value Ref Range    Fasting Status      Sodium 142 135 - 145 mmol/L    Potassium 5.2 (H) 3.4 - 5.1 mmol/L    Chloride 116 (H) 98 - 107 mmol/L    Carbon Dioxide 25 21 - 32 mmol/L    Anion Gap 6 (L) 10 - 20 mmol/L    Glucose 111 (H) 65 - 99 mg/dL    BUN 12 6 - 20 mg/dL    Creatinine 0.62 0.51 - 0.95 mg/dL    Glomerular Filtration Rate >90 >90 mL/min/1.73m2    BUN/ Creatinine Ratio 19 7 - 25    Calcium 7.2 (L) 8.4 - 10.2 mg/dL    Bilirubin, Total 1.8 (H) 0.2 - 1.0 mg/dL    GOT/AST 63 (H) <=37 Units/L    GPT/ALT 52 <64 Units/L    Alkaline Phosphatase 503 (H) 45 - 117 Units/L    Albumin 1.7 (L) 3.6 - 5.1 g/dL    Protein, Total 3.9 (L) 6.4 - 8.2 g/dL    Globulin 2.2 2.0 - 4.0 g/dL    A/G Ratio 0.8 (L) 1.0 - 2.4   Magnesium    Collection Time: 12/26/20  3:10 AM   Result Value Ref Range    Magnesium 1.8 1.7 - 2.4 mg/dL   Phosphorus    Collection Time: 12/26/20  3:10 AM   Result Value Ref Range    Phosphorus 2.3 (L) 2.4 - 4.7 mg/dL   C Reactive Protein    Collection Time: 12/26/20  3:10 AM   Result Value Ref Range    C-Reactive Protein 2.5 (H) <=1.0 mg/dL   Sedimentation Rate Westergren    Collection Time: 12/26/20  3:10 AM   Result Value Ref Range    RBC Sedimentation Rate 7 0 - 20 mm/hr   CBC with Automated Differential (performable only)    Collection Time: 12/26/20  3:10 AM   Result Value Ref Range    WBC 2.0 (L) 4.2 - 11.0 K/mcL    RBC 2.32 (L) 4.00 - 5.20 mil/mcL    HGB 7.0 (L) 12.0 - 15.5 g/dL    HCT 22.2 (L) 36.0 - 46.5 %    MCV 95.7 78.0 - 100.0 fl    MCH 30.2 26.0 - 34.0 pg    MCHC 31.5 (L) 32.0 - 36.5 g/dL    RDW-CV 18.3 (H) 11.0 - 15.0 %    PLT 29 (LL) 140 - 450 K/mcL    NRBC 1 (H) <=0 /100 WBC    RDW-SD 58.6 (H) 39.0 - 50.0 fL   Manual  Differential    Collection Time: 12/26/20  3:10 AM   Result Value Ref Range    Neutrophil, Percent 49 %    Lymphocytes, Percent 39 %    Mono, Percent 6 %    Eosinophils, Percent 0 %    Basophils, Percent 0 %    Bands, Percent 2 0 - 10 %    Metamyelocytes, Percent  1 0 - 2 %    Reactive Lymphocytes, Percent 3 0 - 5 %    Absolute Neutrophil 1.0 (L) 1.8 - 7.7 K/mcL    Absolute Lymphocytes 0.8 (L) 1.0 - 4.0 K/mcL    Absolute Monocytes 0.1 (L) 0.3 - 0.9 K/mcL    Absolute Eosinophils 0.0 0.0 - 0.5 K/mcL    Absolute Basophils 0.0 0.0 - 0.3 K/mcL    RBC Morphology Normal Normal    WBC Morphology Normal Normal    Platelet Morphology Normal Normal   t Results (from the past 24 hour(s))   Comprehensive Metabolic Panel    Collection Time: 12/25/20  3:34 AM   Result Value Ref Range    Fasting Status      Sodium 141 135 - 145 mmol/L    Potassium 5.0 3.4 - 5.1 mmol/L    Chloride 115 (H) 98 - 107 mmol/L    Carbon Dioxide 26 21 - 32 mmol/L    Anion Gap 5 (L) 10 - 20 mmol/L    Glucose 117 (H) 65 - 99 mg/dL    BUN 11 6 - 20 mg/dL    Creatinine 0.70 0.51 - 0.95 mg/dL    Glomerular Filtration Rate >90 >90 mL/min/1.73m2    BUN/ Creatinine Ratio 16 7 - 25    Calcium 7.0 (L) 8.4 - 10.2 mg/dL    Bilirubin, Total 2.3 (H) 0.2 - 1.0 mg/dL    GOT/AST 70 (H) <=37 Units/L    GPT/ALT 51 <64 Units/L    Alkaline Phosphatase 429 (H) 45 - 117 Units/L    Albumin 1.6 (L) 3.6 - 5.1 g/dL    Protein, Total 3.7 (L) 6.4 - 8.2 g/dL    Globulin 2.1 2.0 - 4.0 g/dL    A/G Ratio 0.8 (L) 1.0 - 2.4   Magnesium    Collection Time: 12/25/20  3:34 AM   Result Value Ref Range    Magnesium 2.0 1.7 - 2.4 mg/dL   Phosphorus    Collection Time: 12/25/20  3:34 AM   Result Value Ref Range    Phosphorus 2.0 (L) 2.4 - 4.7 mg/dL   C Reactive Protein    Collection Time: 12/25/20  3:34 AM   Result Value Ref Range    C-Reactive Protein 3.9 (H) <=1.0 mg/dL   Sedimentation Rate Westergren    Collection Time: 12/25/20  3:34 AM   Result Value Ref Range    RBC Sedimentation Rate 3 0 -  [Time Spent: ___ minutes] : I have spent [unfilled] minutes of time on the encounter. 20 mm/hr   CBC with Automated Differential (performable only)    Collection Time: 12/25/20  3:34 AM   Result Value Ref Range    WBC 1.0 (L) 4.2 - 11.0 K/mcL    RBC 2.33 (L) 4.00 - 5.20 mil/mcL    HGB 7.0 (L) 12.0 - 15.5 g/dL    HCT 21.8 (L) 36.0 - 46.5 %    MCV 93.6 78.0 - 100.0 fl    MCH 30.0 26.0 - 34.0 pg    MCHC 32.1 32.0 - 36.5 g/dL    RDW-CV 17.2 (H) 11.0 - 15.0 %    PLT 39 (L) 140 - 450 K/mcL    NRBC 0 <=0 /100 WBC    RDW-SD 54.6 (H) 39.0 - 50.0 fL   Manual Differential    Collection Time: 12/25/20  3:34 AM   Result Value Ref Range    Neutrophil, Percent 67 %    Lymphocytes, Percent 27 %    Mono, Percent 4 %    Eosinophils, Percent 0 %    Basophils, Percent 0 %    Reactive Lymphocytes, Percent 2 0 - 5 %    Absolute Neutrophil 0.7 (L) 1.8 - 7.7 K/mcL    Absolute Lymphocytes 0.3 (L) 1.0 - 4.0 K/mcL    Absolute Monocytes 0.0 (L) 0.3 - 0.9 K/mcL    Absolute Eosinophils 0.0 0.0 - 0.5 K/mcL    Absolute Basophils 0.0 0.0 - 0.3 K/mcL    RBC Morphology Normal Normal    WBC Morphology Normal Normal    Platelet Morphology Normal Normal   Light Green Top    Collection Time: 12/25/20  3:41 AM   Result Value Ref Range    Extra Tube Hold for Add Ons       Final Result   1.  No hydronephrosis or obstructive nephrolithiasis.   2.  Redemonstrated findings of anasarca/volume 3rd spacing.   3.  Small bilateral pleural effusions, worse compared to prior study.   4.  Gastric distention with mild gastric wall thickness prominence and surrounding fat stranding.  Gastritis is possible.  Correlate clinically.   5.  Moderate stool in colon.  Right lower quadrant colon containing probable peristomal hernia.  No evidence of bowel obstruction.   6.  Multilevel mild thoracolumbar spine endplate compression deformities, age indeterminate.      Other findings as described.      Electronically Signed by: JIHAN CRAIG M.D.    Signed on: 12/24/2020 7:07 PM          XR CHEST PA OR AP 1 VIEW   Final Result       Tip of the right PICC line now  [>50% of the face to face encounter time was spent on counseling and/or coordination of care for ___] : Greater than 50% of the face to face encounter time was spent on counseling and/or coordination of care for [unfilled] projects somewhat more distally in the expected superior vena cava.         Electronically Signed by: BRENDA LAGUERRE MD    Signed on: 12/22/2020 3:57 PM                Assessment/Plan  Active Problems:    MDS (myelodysplastic syndrome) (CMS/HCC)  Acute blood loss anemia  Hypokalemia    B12 deficiency    Chronic diarrhea    Chronic fatigue disorder    Enterocutaneous fistula    Thrombocytopenia and Anemia POA (CMS/HCC)     History of Appendix Cancer with Fistulas (CMS/HCC)    Cytomegalovirus (CMV) viremia (CMS/HCC)    Pancytopenia (CMS/HCC)    Elevated ferritin    Multiple drug resistant organism (MDRO) culture positive    Gross hematuria     MDS s/p haplo-identical transplant  Weakness  Back pain   Hypokalemia: resolved    Pancytopenia   Mild transaminitis   Hematuria    Plan:  Cont on CBI per urology, cont to look improved    - PRBC transfusion irradiated leukoreduced for hemoglobin <7., given 1U.  - Platelet transfusion irradiated leukoreduced for platelets <20,000  Replace potassium  Follow CBC/CMP closely   Cont current analgesic and bowel regimen   Cont meropenem and voriconazole   Labs reviewed, plan of care discussed with patient.  35 minutes spent taking care of patient, more than 50% time spent coordination of care.     Primary Care Physician  Saba Velasco MD

## 2021-03-25 NOTE — DATA REVIEWED
[FreeTextEntry1] : CT Head (10/2/20):\par - No acute intracranial abnormalities\par - Left middle cranial fossa arachnoid cyst\par \par Labs (10/4/20):\par - CMP notable for ALT 70 <H> and AST 50 <H>\par - CBC, Phos normal\par \par EMG/NCV (2/22/21): Normal study of b/l legs

## 2021-03-25 NOTE — ASSESSMENT
[FreeTextEntry1] : 46 RHF with temporal headaches in setting of stress and hypertension, resolved while on atenolol, also with lower back and bilateral hip pain causing weakness in both legs secondary to musculoskeletal strain, without evidence of radiculopathy or neuropathy on EMG/NCV.

## 2021-03-25 NOTE — PHYSICAL EXAM
[General Appearance - Alert] : alert [General Appearance - In No Acute Distress] : in no acute distress [Oriented To Time, Place, And Person] : oriented to person, place, and time [Impaired Insight] : insight and judgment were intact [Affect] : the affect was normal [Person] : oriented to person [Place] : oriented to place [Time] : oriented to time [Concentration Intact] : normal concentrating ability [Visual Intact] : visual attention was ~T not ~L decreased [Fluency] : fluency intact [Comprehension] : comprehension intact [Cranial Nerves Optic (II)] : visual acuity intact bilaterally,  visual fields full to confrontation, pupils equal round and reactive to light [Cranial Nerves Oculomotor (III)] : extraocular motion intact [Cranial Nerves Trigeminal (V)] : facial sensation intact symmetrically [Cranial Nerves Facial (VII)] : face symmetrical [Cranial Nerves Vestibulocochlear (VIII)] : hearing was intact bilaterally [Cranial Nerves Glossopharyngeal (IX)] : tongue and palate midline [Cranial Nerves Accessory (XI - Cranial And Spinal)] : head turning and shoulder shrug symmetric [Cranial Nerves Hypoglossal (XII)] : there was no tongue deviation with protrusion [Motor Strength] : muscle strength was normal in all four extremities [No Muscle Atrophy] : normal bulk in all four extremities [Motor Handedness Right-Handed] : the patient is right hand dominant [Sensation Tactile Decrease] : light touch was intact [Balance] : balance was intact [2+] : Ankle jerk left 2+ [Sclera] : the sclera and conjunctiva were normal [PERRL With Normal Accommodation] : pupils were equal in size, round, reactive to light, with normal accommodation [Extraocular Movements] : extraocular movements were intact [Outer Ear] : the ears and nose were normal in appearance [Oropharynx] : the oropharynx was normal [Neck Appearance] : the appearance of the neck was normal [Auscultation Breath Sounds / Voice Sounds] : lungs were clear to auscultation bilaterally [Heart Rate And Rhythm] : heart rate was normal and rhythm regular [Heart Sounds] : normal S1 and S2 [Arterial Pulses Carotid] : carotid pulses were normal with no bruits [Full Pulse] : the pedal pulses are present [Edema] : there was no peripheral edema [Abdomen Soft] : soft [Abdomen Tenderness] : non-tender [No CVA Tenderness] : no ~M costovertebral angle tenderness [No Spinal Tenderness] : no spinal tenderness [Abnormal Walk] : normal gait [Nail Clubbing] : no clubbing  or cyanosis of the fingernails [Musculoskeletal - Swelling] : no joint swelling seen [Motor Tone] : muscle strength and tone were normal [Skin Color & Pigmentation] : normal skin color and pigmentation [Skin Turgor] : normal skin turgor [] : no rash [5] : ankle plantar flexion 5/5 [4] : knee extension 4/5 [Sensation Pain / Temperature Decrease] : pain and temperature was intact [Paresis Pronator Drift Right-Sided] : no pronator drift on the right [Motor Strength Upper Extremities Bilaterally] : strength was normal in both upper extremities [Romberg's Sign] : Romberg's sign was negtive [Past-pointing] : there was no past-pointing [Tremor] : no tremor present [Dysdiadochokinesia Bilaterally] : not present [Coordination - Dysmetria Impaired Finger-to-Nose Bilateral] : not present [Coordination - Dysmetria Impaired Heel-to-Shin Bilateral] : not present [Plantar Reflex Right Only] : normal on the right [Plantar Reflex Left Only] : normal on the left [___] : absent on the right [___] : absent on the left [FreeTextEntry8] : Normal, narrow-based gait. No difficulty with tiptoe, heel, and tandem gaits.

## 2021-03-25 NOTE — HISTORY OF PRESENT ILLNESS
[FreeTextEntry1] : FROM 12/24/20:\par Interview conducted in [place], with the assistance of the patient's . This is a 45-year-old right-handed woman who was admitted to [facility] during October 1-4, 2020 for sinus tachycardia and bitemporal headache. She has had decreased intensity of headache to 4-5/10 since being discharged from the hospital with amitriptyline 25mg PO QHS. She has also been taking propranolol 10mg PO BID as recommended by the Cardiology consultant for management of sinus tachycardia. 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 PCP: Dr. Erica Torres.\par \par FROM 3/25/21:\par Interview conducted in [place], with the assistance of the patient's . The patient, now 46, states that she does not regularly have headaches anymore, but can still have a headache with 5/10 intensity in times of stress. Propranolol 10mg PO BID has been switched to atenolol 50mg PO Daily by nephrologist, Dr. Regina Frankel, although the patient says that this doctor is her cardiologist. Soon after switching to atenolol, the patient experienced two episodes on 3/22 - 3/23 of fatigue and headache with vertical double vision for a few minutes each. During one of these episodes, she and her  measured her BP to be 117/57 (HR 60). She stopped taking amitriptyline about 3 weeks ago, but has not noticed any new headaches, anxiety, depression, or insomnia. She is continuing to attend physical therapy sessions twice per week to relieve her lower back and leg pain. She currently does not have pain in her right leg, although she has some in her left hip and left leg at present.

## 2021-06-15 NOTE — ASU PATIENT PROFILE, ADULT - NS PRO TALK SOMEONE YN
Nerve Block    Date/Time: 6/15/2021 12:46 PM  Performed by: Dann Estevez MD  Authorized by: Dann Estevez MD     Block Type :  Femoral adductor canal  Laterality:  Left  Patient Location:  Pre-op  Indication: post-op pain management at surgeon's request and at surgeon's request    patient identified, IV checked, risks and benefits discussed, surgical consent, monitors and equipment checked, pre-op evaluation and timeout performed    Patient Position:  Supine  Prep:  Chlorhexidine gluconate (CHG)  Max Sterile Barrier Technique:  Hand Washing, Cap/Mask, Sterile gloves and Sterile gel & probe cover  Monitoring:  Blood pressure, continuous pulse oximetry, heart rate and EKG  Injection Technique:  Single-shot  Procedures: ultrasound guided    Local Infiltration:  Bupivacaine  Strength:  0.5  Dose:  30 mL  Needle Type:  Echogenic  Needle Gauge:  20 G  Needle Length:  4 in  Needle Localization:  Ultrasound guidance  Test Dose:  Negative  Test Dose Volume (ml):  5   in-plane  Physical status during block:  Awake  Injection Assessment:  Negative aspiration for heme, no paresthesia on injection, incremental injection, local visualized surrounding nerve on ultrasound and no resistance to injection  Patient Condition:  Tolerated well, no immediate complications  Paresthesia Pain:  None  Heart Rate Change: No    Slowly Injected: Yes    Performed By:  Anesthesiologist  Anesthesiologist:  Dann Estevez MD  Start Time:  6/15/2021 12:39 PM         no

## 2021-06-25 ENCOUNTER — APPOINTMENT (OUTPATIENT)
Dept: NEUROLOGY | Facility: CLINIC | Age: 46
End: 2021-06-25
Payer: MEDICAID

## 2021-06-25 VITALS
HEART RATE: 90 BPM | BODY MASS INDEX: 43.39 KG/M2 | HEIGHT: 66 IN | SYSTOLIC BLOOD PRESSURE: 134 MMHG | DIASTOLIC BLOOD PRESSURE: 81 MMHG | WEIGHT: 270 LBS

## 2021-06-25 PROCEDURE — 99215 OFFICE O/P EST HI 40 MIN: CPT

## 2021-06-25 RX ORDER — ATORVASTATIN CALCIUM 20 MG/1
20 TABLET, FILM COATED ORAL DAILY
Refills: 0 | Status: DISCONTINUED | COMMUNITY
End: 2021-06-25

## 2021-06-25 NOTE — HISTORY OF PRESENT ILLNESS
[FreeTextEntry1] : FROM 12/24/20:\par Interview conducted in [place], with the assistance of the patient's . This is a 45-year-old right-handed woman who was admitted to [facility] during October 1-4, 2020 for sinus tachycardia and bitemporal headache. She has had decreased intensity of headache to 4-5/10 since being discharged from the hospital with amitriptyline 25mg PO QHS. She has also been taking propranolol 10mg PO BID as recommended by the Cardiology consultant for management of sinus tachycardia. 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 PCP: Dr. Erica Torres.\par \par FROM 3/25/21:\par Interview conducted in [place], with the assistance of the patient's . The patient, now 46, states that she does not regularly have headaches anymore, but can still have a headache with 5/10 intensity in times of stress. Propranolol 10mg PO BID has been switched to atenolol 50mg PO Daily by nephrologist, Dr. Regina Frankel, although the patient says that this doctor is her cardiologist. Soon after switching to atenolol, the patient experienced two episodes on 3/22 - 3/23 of fatigue and headache with vertical double vision for a few minutes each. During one of these episodes, she and her  measured her BP to be 117/57 (HR 60). She stopped taking amitriptyline about 3 weeks ago, but has not noticed any new headaches, anxiety, depression, or insomnia. She is continuing to attend physical therapy sessions twice per week to relieve her lower back and leg pain. She currently does not have pain in her right leg, although she has some in her left hip and left leg at present.\par \par FROM 6/25/21:\par Interview conducted in [place], with the assistance of the patient's . The patient continues to experience daily headaches at both temples, ranging from 5/10 to 10/10 in intensity. Headaches are triggered by stress and claustrophobia. The patient notes that her propranolol has been switched to atenolol by her PCP, and that she has been started on levothyroxine for low thyroid hormone levels, as well as completed a course of cephalexin to treat a presumed respiratory infections. She has also had a planned procedure for evaluation of the veins in her legs, to treat pain associated with them.

## 2021-06-25 NOTE — ASSESSMENT
[FreeTextEntry1] : 46 RHF with temporal headaches in setting of stress and hypertension, persisting even while on atenolol, also with lower back and bilateral hip pain causing weakness in both legs secondary to musculoskeletal strain, without evidence of radiculopathy or neuropathy on EMG/NCV.

## 2021-06-25 NOTE — PHYSICAL EXAM
[General Appearance - Alert] : alert [General Appearance - In No Acute Distress] : in no acute distress [Oriented To Time, Place, And Person] : oriented to person, place, and time [Affect] : the affect was normal [Impaired Insight] : insight and judgment were intact [Person] : oriented to person [Place] : oriented to place [Time] : oriented to time [Concentration Intact] : normal concentrating ability [Visual Intact] : visual attention was ~T not ~L decreased [Fluency] : fluency intact [Comprehension] : comprehension intact [Cranial Nerves Optic (II)] : visual acuity intact bilaterally,  visual fields full to confrontation, pupils equal round and reactive to light [Cranial Nerves Trigeminal (V)] : facial sensation intact symmetrically [Cranial Nerves Oculomotor (III)] : extraocular motion intact [Cranial Nerves Facial (VII)] : face symmetrical [Cranial Nerves Vestibulocochlear (VIII)] : hearing was intact bilaterally [Cranial Nerves Glossopharyngeal (IX)] : tongue and palate midline [Cranial Nerves Accessory (XI - Cranial And Spinal)] : head turning and shoulder shrug symmetric [Cranial Nerves Hypoglossal (XII)] : there was no tongue deviation with protrusion [Motor Strength] : muscle strength was normal in all four extremities [No Muscle Atrophy] : normal bulk in all four extremities [Motor Handedness Right-Handed] : the patient is right hand dominant [4] : knee extension 4/5 [5] : ankle plantar flexion 5/5 [Sensation Tactile Decrease] : light touch was intact [Sensation Pain / Temperature Decrease] : pain and temperature was intact [Balance] : balance was intact [2+] : Ankle jerk left 2+ [Sclera] : the sclera and conjunctiva were normal [PERRL With Normal Accommodation] : pupils were equal in size, round, reactive to light, with normal accommodation [Extraocular Movements] : extraocular movements were intact [Outer Ear] : the ears and nose were normal in appearance [Oropharynx] : the oropharynx was normal [Neck Appearance] : the appearance of the neck was normal [Auscultation Breath Sounds / Voice Sounds] : lungs were clear to auscultation bilaterally [Heart Rate And Rhythm] : heart rate was normal and rhythm regular [Heart Sounds] : normal S1 and S2 [Arterial Pulses Carotid] : carotid pulses were normal with no bruits [Full Pulse] : the pedal pulses are present [Edema] : there was no peripheral edema [Abdomen Soft] : soft [No CVA Tenderness] : no ~M costovertebral angle tenderness [Abdomen Tenderness] : non-tender [No Spinal Tenderness] : no spinal tenderness [Abnormal Walk] : normal gait [Nail Clubbing] : no clubbing  or cyanosis of the fingernails [Musculoskeletal - Swelling] : no joint swelling seen [Motor Tone] : muscle strength and tone were normal [Skin Color & Pigmentation] : normal skin color and pigmentation [Skin Turgor] : normal skin turgor [] : no rash [Paresis Pronator Drift Right-Sided] : no pronator drift on the right [Motor Strength Upper Extremities Bilaterally] : strength was normal in both upper extremities [Romberg's Sign] : Romberg's sign was negtive [Past-pointing] : there was no past-pointing [Tremor] : no tremor present [Dysdiadochokinesia Bilaterally] : not present [Coordination - Dysmetria Impaired Finger-to-Nose Bilateral] : not present [Coordination - Dysmetria Impaired Heel-to-Shin Bilateral] : not present [Plantar Reflex Right Only] : normal on the right [Plantar Reflex Left Only] : normal on the left [___] : absent on the right [___] : absent on the left [FreeTextEntry8] : Normal, narrow-based gait. No difficulty with tiptoe, heel, and tandem gaits.

## 2021-09-23 ENCOUNTER — APPOINTMENT (OUTPATIENT)
Dept: ORTHOPEDIC SURGERY | Facility: CLINIC | Age: 46
End: 2021-09-23

## 2021-09-28 ENCOUNTER — APPOINTMENT (OUTPATIENT)
Dept: NEUROLOGY | Facility: CLINIC | Age: 46
End: 2021-09-28
Payer: MEDICAID

## 2021-09-28 VITALS
DIASTOLIC BLOOD PRESSURE: 86 MMHG | SYSTOLIC BLOOD PRESSURE: 120 MMHG | WEIGHT: 270 LBS | HEIGHT: 66 IN | BODY MASS INDEX: 43.39 KG/M2 | HEART RATE: 55 BPM

## 2021-09-28 PROCEDURE — 99215 OFFICE O/P EST HI 40 MIN: CPT

## 2021-09-28 RX ORDER — ISONIAZID 300 MG/1
300 TABLET ORAL DAILY
Qty: 30 | Refills: 0 | Status: DISCONTINUED | COMMUNITY
Start: 2020-12-24 | End: 2021-09-28

## 2021-09-28 RX ORDER — ERGOCALCIFEROL 1.25 MG/1
1.25 MG CAPSULE, LIQUID FILLED ORAL
Qty: 5 | Refills: 6 | Status: DISCONTINUED | COMMUNITY
Start: 2020-12-24 | End: 2021-09-28

## 2021-09-28 RX ORDER — UREA 10 %
50 LOTION (ML) TOPICAL DAILY
Qty: 90 | Refills: 1 | Status: DISCONTINUED | COMMUNITY
Start: 2020-12-24 | End: 2021-09-28

## 2021-09-28 NOTE — PHYSICAL EXAM
[General Appearance - Alert] : alert [General Appearance - In No Acute Distress] : in no acute distress [Oriented To Time, Place, And Person] : oriented to person, place, and time [Impaired Insight] : insight and judgment were intact [Affect] : the affect was normal [Person] : oriented to person [Place] : oriented to place [Time] : oriented to time [Concentration Intact] : normal concentrating ability [Visual Intact] : visual attention was ~T not ~L decreased [Fluency] : fluency intact [Comprehension] : comprehension intact [Cranial Nerves Optic (II)] : visual acuity intact bilaterally,  visual fields full to confrontation, pupils equal round and reactive to light [Cranial Nerves Oculomotor (III)] : extraocular motion intact [Cranial Nerves Trigeminal (V)] : facial sensation intact symmetrically [Cranial Nerves Facial (VII)] : face symmetrical [Cranial Nerves Vestibulocochlear (VIII)] : hearing was intact bilaterally [Cranial Nerves Glossopharyngeal (IX)] : tongue and palate midline [Cranial Nerves Accessory (XI - Cranial And Spinal)] : head turning and shoulder shrug symmetric [Cranial Nerves Hypoglossal (XII)] : there was no tongue deviation with protrusion [Motor Tone] : muscle tone was normal in all four extremities [Motor Strength] : muscle strength was normal in all four extremities [No Muscle Atrophy] : normal bulk in all four extremities [Motor Handedness Right-Handed] : the patient is right hand dominant [Sensation Tactile Decrease] : light touch was intact [Sensation Pain / Temperature Decrease] : pain and temperature was intact [Balance] : balance was intact [2+] : Ankle jerk left 2+ [Sclera] : the sclera and conjunctiva were normal [PERRL With Normal Accommodation] : pupils were equal in size, round, reactive to light, with normal accommodation [Extraocular Movements] : extraocular movements were intact [Outer Ear] : the ears and nose were normal in appearance [Oropharynx] : the oropharynx was normal [Neck Appearance] : the appearance of the neck was normal [Auscultation Breath Sounds / Voice Sounds] : lungs were clear to auscultation bilaterally [Heart Rate And Rhythm] : heart rate was normal and rhythm regular [Heart Sounds] : normal S1 and S2 [Arterial Pulses Carotid] : carotid pulses were normal with no bruits [Full Pulse] : the pedal pulses are present [Edema] : there was no peripheral edema [Abdomen Soft] : soft [Abdomen Tenderness] : non-tender [No CVA Tenderness] : no ~M costovertebral angle tenderness [No Spinal Tenderness] : no spinal tenderness [Abnormal Walk] : normal gait [Skin Color & Pigmentation] : normal skin color and pigmentation [Skin Turgor] : normal skin turgor [] : no rash [5] : knee flexion 5/5 [4] : knee flexion 4/5 [3] : knee extension 3/5 [Paresis Pronator Drift Right-Sided] : no pronator drift on the right [Motor Strength Upper Extremities Bilaterally] : strength was normal in both upper extremities [Romberg's Sign] : Romberg's sign was negtive [Past-pointing] : there was no past-pointing [Tremor] : no tremor present [Dysdiadochokinesia Bilaterally] : not present [Coordination - Dysmetria Impaired Finger-to-Nose Bilateral] : not present [Coordination - Dysmetria Impaired Heel-to-Shin Bilateral] : not present [Plantar Reflex Right Only] : normal on the right [Plantar Reflex Left Only] : normal on the left [___] : absent on the right [___] : absent on the left [FreeTextEntry8] : Normal, narrow-based gait. No difficulty with tiptoe, heel, and tandem gaits. [FreeTextEntry1] : Left knee swelling

## 2021-09-28 NOTE — HISTORY OF PRESENT ILLNESS
[FreeTextEntry1] : FROM 12/24/20:\par Interview conducted in Polish, with the assistance of the patient's . This is a 45-year-old right-handed woman who was admitted to [facility] during October 1-4, 2020 for sinus tachycardia and bitemporal headache. She has had decreased intensity of headache to 4-5/10 since being discharged from the hospital with amitriptyline 25mg PO QHS. She has also been taking propranolol 10mg PO BID as recommended by the Cardiology consultant for management of sinus tachycardia. 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 PCP: Dr. Erica Torres.\par \par FROM 3/25/21:\par Interview conducted in Polish, with the assistance of the patient's . The patient, now 46, states that she does not regularly have headaches anymore, but can still have a headache with 5/10 intensity in times of stress. Propranolol 10mg PO BID has been switched to atenolol 50mg PO Daily by nephrologist, Dr. Regina Frankel, although the patient says that this doctor is her cardiologist. Soon after switching to atenolol, the patient experienced two episodes on 3/22 - 3/23 of fatigue and headache with vertical double vision for a few minutes each. During one of these episodes, she and her  measured her BP to be 117/57 (HR 60). She stopped taking amitriptyline about 3 weeks ago, but has not noticed any new headaches, anxiety, depression, or insomnia. She is continuing to attend physical therapy sessions twice per week to relieve her lower back and leg pain. She currently does not have pain in her right leg, although she has some in her left hip and left leg at present.\par \par FROM 6/25/21:\par Interview conducted in Polish, with the assistance of the patient's . The patient continues to experience daily headaches at both temples, ranging from 5/10 to 10/10 in intensity. Headaches are triggered by stress and claustrophobia. The patient notes that her propranolol has been switched to atenolol by her PCP, and that she has been started on levothyroxine for low thyroid hormone levels, as well as completed a course of cephalexin to treat a presumed respiratory infections. She has also had a planned procedure for evaluation of the veins in her legs, to treat pain associated with them.\par \par FROM 9/28/21:\par Interview conducted in Polish, with the assistance of the patient's . The patient states that over the past 2-3 weeks, she has been experiencing recurrence of daily headaches at the right posterior head and at both temples, waxing and waning throughout the day, described as hot and sometimes pressure-like, sometimes radiating to the back of her neck. She has been experiencing worsening left knee pain, and now walks with a limp. She is concerned that the medicines she has been prescribed for pain as needed (acetaminophen, ibuprofen, cyclobenzaprine) may be contributing to her headaches. She has only been taking atenolol 50mg on some days.

## 2021-09-28 NOTE — ASSESSMENT
[FreeTextEntry1] : 46 RHF with temporal headaches in setting of stress and hypertension, non-compliant with atenolol, also with lower back and bilateral hip pain, as well as worsening left knee pain impairing ambulation.

## 2021-10-29 ENCOUNTER — APPOINTMENT (OUTPATIENT)
Dept: ORTHOPEDIC SURGERY | Facility: CLINIC | Age: 46
End: 2021-10-29
Payer: MEDICAID

## 2021-10-29 VITALS
DIASTOLIC BLOOD PRESSURE: 83 MMHG | WEIGHT: 270 LBS | BODY MASS INDEX: 43.39 KG/M2 | HEIGHT: 66 IN | SYSTOLIC BLOOD PRESSURE: 131 MMHG | HEART RATE: 80 BPM

## 2021-10-29 PROCEDURE — 99204 OFFICE O/P NEW MOD 45 MIN: CPT

## 2021-10-29 PROCEDURE — 72100 X-RAY EXAM L-S SPINE 2/3 VWS: CPT

## 2021-10-29 NOTE — DISCUSSION/SUMMARY
[de-identified] : Discussed findings of today's exam and possible causes of patient's pain.  Educated patient on their most probable diagnosis of chronic low back and left lower extremity pain with recent atraumatic exacerbation due to left lumbar radiculopathy.  Reviewed possible courses of treatment, and we collaboratively decided best course of treatment at this time will include conservative management.  Patient came in today with complaint of left leg and lower extremity pain with pain in her calf, educated the patient and her  that this is due to left lumbar radiculopathy, she does not have any apparent focal deficits within the lower leg or calf area.  X-ray in the office today shows that patient has moderate osteoarthritis and likely has concomitant degenerative disc disease.  Patient will be started on a course of physical therapy to restore normal range of motion and strength as tolerated.  Patient started on a course of oral NSAIDs, prescription given for Naprosyn (We discussed all possible side effects of this medication).  I also recommend we proceed with advanced imaging with MRI to evaluate for left-sided disc herniation and nerve impingement.  Patient will follow up when MRI results are available.  Patient and spouse appreciate and agree with current plan.\par \par A  (patient's ) was utilized to communicate the patient's primary language.\par I work as part of an academic orthopedic group and routinely have a physician in training (resident / fellow) working with me.  Any part of the history and physical exam performed by the physician in training was either directly reviewed and/or replicated by myself.  Any procedure performed by the physician in training was performed under my direct supervision and with the consent of the patient.\par \par This note was generated using dragon medical dictation software.  A reasonable effort has been made for proofreading its contents, but typos may still remain.  If there are any questions or points of clarification needed please notify my office.

## 2021-10-29 NOTE — HISTORY OF PRESENT ILLNESS
[de-identified] : Patient is here for left calf/knee pain that began over a year ago.  She also has pain in the left side of her low back during this time. There was no inciting injury but she has fallen in the past on her knees. She was seen by a a different provider who sent her to PT which provided relief. She has taken Ibuprofen and Flexeril in the past for pain relief. She is wearing a compression brace around her calf. She has pain when walking which is better at rest. 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 \par \par The patient's past medical history, past surgical history, medications and allergies were reviewed by me today and documented accordingly. In addition, the patient's family and social history, which were noncontributory to this visit, were reviewed also. Intake form was reviewed. The patient has no family history of arthritis.

## 2021-10-29 NOTE — REASON FOR VISIT
[Initial Visit] : an initial visit for [Family Member] : family member [FreeTextEntry2] : left LE pain

## 2021-10-29 NOTE — PHYSICAL EXAM
[de-identified] : Constitutional: Well-nourished, well-developed, No acute distress, obese\par Respiratory:  Good respiratory effort, no SOB\par Lymphatic: No regional lymphadenopathy, no lymphedema\par Psychiatric: Pleasant and normal affect, alert and oriented x3\par Musculoskeletal: normal except where as noted in regional exam\par \par Lumbar Spine Exam\par APPEARANCE: no marked deformities or malalignment, normal curvature of the lumbosacral spine. good posture.\par POSITIVE TENDERNESS: + Left lower lumbar paraspinal muscles\par NONTENDER: no bony midline tenderness\par ROM: Limited flexion due to stiffness and pain, mildly limited SB/Rot\par RESISTIVE TESTING: + pain 4/5 resisted flex/ext, sidebending b/l, and rotation\par SPECIAL TESTS: + Left SLR and GAVIN, neg Trendelenburg b/l \par PULSES: 2+ DP/PT pulses\par NEURO:   L1 - S2 intact to sensation and motor, DTRs 2+/4 patella and achilles\par \par Left lower Leg:\par APPEARANCE: no marked deformities, no swelling or malalignment\par POSITIVE TENDERNESS:  none\par NONTENDER: Tibiofemoral jt lines b/l, patellar & quadriceps tendons, Medial and lateral tibial plateua, tibial tuberosity, pes bursa, proximal fibular head, medial/anterior tibial shaft, fibula shaft, medial/lateral malleoli, anterior/posterior tibialis, peroneals, gastroc/achilles\par ROM: full & painless in the knee and ankle, no pain with rotation of the leg. \par RESISTIVE TESTING: Mild crepitus of the anterior knee particular with resisted knee extension, painless resisted knee flex/ext, Ankle DF/PF/Inversion/Eversion. \par  [de-identified] : The following radiographs were ordered and read by me during this patient's visit. I reviewed each radiograph in detail with the patient and discussed the findings as highlighted below. \par \par 2 views of the lumbar spine were obtained today that show degenerative changes and evidence of [default value] osteoarthritis in the [default value].  No fracture, or dislocation seen at this time. There is no malalignment. No other obvious osseous abnormality. Otherwise unremarkable.

## 2021-12-07 NOTE — PHYSICAL THERAPY INITIAL EVALUATION ADULT - FOLLOWS COMMANDS/ANSWERS QUESTIONS, REHAB EVAL
Is This A New Presentation, Or A Follow-Up?: Skin Lesion
Additional History: New pt, coming in for skin lesions in the private area. Notes no itching or pain. Noticed them a few months ago.
100% of the time

## 2021-12-14 ENCOUNTER — APPOINTMENT (OUTPATIENT)
Dept: NEUROLOGY | Facility: CLINIC | Age: 46
End: 2021-12-14
Payer: MEDICAID

## 2021-12-14 PROCEDURE — 95885 MUSC TST DONE W/NERV TST LIM: CPT

## 2021-12-14 PROCEDURE — 99213 OFFICE O/P EST LOW 20 MIN: CPT | Mod: 25

## 2021-12-14 PROCEDURE — 95908 NRV CNDJ TST 3-4 STUDIES: CPT

## 2021-12-14 NOTE — PHYSICAL EXAM
[Person] : oriented to person [Place] : oriented to place [Time] : oriented to time [Concentration Intact] : normal concentrating ability [Visual Intact] : visual attention was ~T not ~L decreased [Fluency] : fluency intact [Comprehension] : comprehension intact [Cranial Nerves Optic (II)] : visual acuity intact bilaterally,  visual fields full to confrontation, pupils equal round and reactive to light [Cranial Nerves Oculomotor (III)] : extraocular motion intact [Cranial Nerves Trigeminal (V)] : facial sensation intact symmetrically [Cranial Nerves Facial (VII)] : face symmetrical [Cranial Nerves Vestibulocochlear (VIII)] : hearing was intact bilaterally [Cranial Nerves Glossopharyngeal (IX)] : tongue and palate midline [Cranial Nerves Accessory (XI - Cranial And Spinal)] : head turning and shoulder shrug symmetric [Cranial Nerves Hypoglossal (XII)] : there was no tongue deviation with protrusion [Motor Tone] : muscle tone was normal in all four extremities [Motor Strength] : muscle strength was normal in all four extremities [No Muscle Atrophy] : normal bulk in all four extremities [Motor Handedness Right-Handed] : the patient is right hand dominant [4] : knee flexion 4/5 [3] : knee extension 3/5 [5] : ankle plantar flexion 5/5 [Sensation Tactile Decrease] : light touch was intact [Sensation Pain / Temperature Decrease] : pain and temperature was intact [Abnormal Walk] : normal gait [Balance] : balance was intact [2+] : Ankle jerk left 2+ [Paresis Pronator Drift Right-Sided] : no pronator drift on the right [Motor Strength Upper Extremities Bilaterally] : strength was normal in both upper extremities [Romberg's Sign] : Romberg's sign was negtive [Past-pointing] : there was no past-pointing [Tremor] : no tremor present [Dysdiadochokinesia Bilaterally] : not present [Coordination - Dysmetria Impaired Heel-to-Shin Bilateral] : not present [Coordination - Dysmetria Impaired Finger-to-Nose Bilateral] : not present [Plantar Reflex Right Only] : normal on the right [Plantar Reflex Left Only] : normal on the left [___] : absent on the right [___] : absent on the left [FreeTextEntry8] : Normal, narrow-based gait. No difficulty with tiptoe, heel, and tandem gaits.

## 2022-01-14 ENCOUNTER — APPOINTMENT (OUTPATIENT)
Dept: NEUROLOGY | Facility: CLINIC | Age: 47
End: 2022-01-14

## 2022-02-11 ENCOUNTER — TRANSCRIPTION ENCOUNTER (OUTPATIENT)
Age: 47
End: 2022-02-11

## 2023-02-01 NOTE — CONSULT NOTE ADULT - CONSULT REQUESTED DATE/TIME
02-Oct-2020 11:39 [Feeling Fatigued] : feeling fatigued [Dyspnea on exertion] : dyspnea during exertion [Joint Pain] : joint pain [Negative] : Respiratory [Fever] : no fever [Headache] : no headache [Chills] : no chills [Blurry Vision] : no blurred vision [Chest Discomfort] : no chest discomfort [Lower Ext Edema] : no extremity edema [Palpitations] : no palpitations [Orthopnea] : no orthopnea [PND] : no PND [Abdominal Pain] : no abdominal pain [Nausea] : no nausea [Vomiting] : no vomiting [Rash] : no rash [Itching] : no itching [Dizziness] : no dizziness [Tremor] : no tremor was seen [Numbness (Hypoesthesia)] : no numbness [Tingling (Paresthesia)] : no tingling [Confusion] : no confusion was observed [Anxiety] : no anxiety [Under Stress] : not under stress [Easy Bleeding] : no tendency for easy bleeding [Easy Bruising] : no tendency for easy bruising

## 2023-05-09 ENCOUNTER — APPOINTMENT (OUTPATIENT)
Dept: ORTHOPEDIC SURGERY | Facility: CLINIC | Age: 48
End: 2023-05-09
Payer: MEDICAID

## 2023-05-09 VITALS — HEIGHT: 66 IN | WEIGHT: 287 LBS | BODY MASS INDEX: 46.12 KG/M2

## 2023-05-09 DIAGNOSIS — S93.501A UNSPECIFIED SPRAIN OF RIGHT GREAT TOE, INITIAL ENCOUNTER: ICD-10-CM

## 2023-05-09 DIAGNOSIS — S83.412A SPRAIN OF MEDIAL COLLATERAL LIGAMENT OF LEFT KNEE, INITIAL ENCOUNTER: ICD-10-CM

## 2023-05-09 PROCEDURE — 73630 X-RAY EXAM OF FOOT: CPT | Mod: RT

## 2023-05-09 PROCEDURE — 72100 X-RAY EXAM L-S SPINE 2/3 VWS: CPT

## 2023-05-09 PROCEDURE — 99204 OFFICE O/P NEW MOD 45 MIN: CPT

## 2023-05-09 PROCEDURE — 73562 X-RAY EXAM OF KNEE 3: CPT | Mod: LT

## 2023-05-09 NOTE — HISTORY OF PRESENT ILLNESS
[] : yes [FreeTextEntry1] : right foot/b/l hips/b/l knees [de-identified] : 47 yo fm presents after fall on 4/29/23. She was shopping, slipped on floor, and since has had LBP that radiated down both legs to feet. She has numbness in her R big toe. She has L knee pain since the fall that’s sharp and intermittent. [FreeTextEntry3] : 4/29/23 [FreeTextEntry5] : patient [place] and fell in [employer]. her legs split and she fell on her both her knees.  [FreeTextEntry7] : into toes

## 2023-05-09 NOTE — PHYSICAL EXAM
[Disc space narrowing] : Disc space narrowing [NL (0)] : extension 0 degrees [5___] : hamstring 5[unfilled]/5 [Equivocal] : equivocal Erinn [Left] : left knee [AP] : anteroposterior [Lateral] : lateral [TWNoteComboBox7] : flexion 115 degrees [] : metatarsophalangeal tenderness [1st] : 1st [Right] : right foot [There are no fractures, subluxations or dislocations. No significant abnormalities are seen] : There are no fractures, subluxations or dislocations. No significant abnormalities are seen

## 2023-06-01 ENCOUNTER — TRANSCRIPTION ENCOUNTER (OUTPATIENT)
Age: 48
End: 2023-06-01

## 2023-06-01 ENCOUNTER — APPOINTMENT (OUTPATIENT)
Dept: NEUROLOGY | Facility: CLINIC | Age: 48
End: 2023-06-01
Payer: MEDICAID

## 2023-06-01 VITALS
SYSTOLIC BLOOD PRESSURE: 116 MMHG | DIASTOLIC BLOOD PRESSURE: 80 MMHG | HEIGHT: 66 IN | BODY MASS INDEX: 45.8 KG/M2 | HEART RATE: 90 BPM | WEIGHT: 285 LBS

## 2023-06-01 DIAGNOSIS — R29.898 OTHER SYMPTOMS AND SIGNS INVOLVING THE MUSCULOSKELETAL SYSTEM: ICD-10-CM

## 2023-06-01 DIAGNOSIS — E66.9 OBESITY, UNSPECIFIED: ICD-10-CM

## 2023-06-01 PROCEDURE — 99215 OFFICE O/P EST HI 40 MIN: CPT

## 2023-06-01 RX ORDER — MAGNESIUM OXIDE 241.3 MG/1000MG
400 TABLET ORAL DAILY
Qty: 90 | Refills: 0 | Status: ACTIVE | COMMUNITY
Start: 2021-06-25 | End: 1900-01-01

## 2023-06-01 RX ORDER — UBIDECARENONE 200 MG
200 CAPSULE ORAL
Qty: 90 | Refills: 0 | Status: ACTIVE | COMMUNITY
Start: 2021-06-25 | End: 1900-01-01

## 2023-06-01 RX ORDER — RIBOFLAVIN (VITAMIN B2) 400 MG
400 TABLET ORAL
Qty: 90 | Refills: 0 | Status: ACTIVE | COMMUNITY
Start: 2021-06-25 | End: 1900-01-01

## 2023-06-01 NOTE — REVIEW OF SYSTEMS
[As Noted in HPI] : as noted in HPI [Limb Pain] : limb pain [Negative] : Integumentary [FreeTextEntry9] : left leg pain from fall

## 2023-06-01 NOTE — HISTORY OF PRESENT ILLNESS
[FreeTextEntry1] : This is a case of a 47 yo right handed female with PMH of lumbar radiculopathy, headaches, hyperalcoholemia, hypothyroidism, h.pylori presents today with headache. \par Pt started having headaches approx 30's. Pt had b/ temples and  described as throbbing.  Pt took Tylenol which help relieved about 30 mins later.  Triggers would include a lot of stress.  Denies light sensitivity, sound  sensitivity, or auras.  \par Today patient states she is having b/l temples and in the back of the head as well as the back of the eyes which has been going on for a year.  Describes pain as throbbing.  pain 10/10. Pt stated she is having headaches 5 times a week and is intermittent pain which will come and goes.  Pt recently was treated with methylprednisone 4mg (finished yesterday) which she stated did not relieve the pain. Pt will take Tylenol once in a while which will relieve the pain to a 5/10. Denies nausea or vomiting. Denies light sensitivity, sound  sensitivity, or auras.   Denies fevers or muscle aches. Pt does have neck tightness and pain.   Pt does have some blurred vision still but denies double or loss or vision.  Pt does report some dizziness at times with the headaches.  Triggers would include a lot of stress and unable to pay bills.  Patient also currently in physical therapy because of hip pain. She recently had a fall where she slipped on water.   This causes her not to be able to work. Pt is currently seeing Dr. De La O (ortho) for work up on her left leg pain. \par \par Pt also reported that she stopped taking per prescribed medicine (levothyroxine and atorvastatin and atenolol)  because she did not want take it and does not want to be on medicine.  Educated patient on the importance of these medication when they are needed.  Pt stated she has not been to the doctor because she did not want to follow up.  Pt  did tell me they are going to follow up with a new provider at the end of this month.  Pt will see Dr Garcia for PCP to have blood work done.  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 [employer]\par Caffeine: 1 cup of coffee\par Water: Drinks a lot of water \par Exercise: currently doing PT for leg and hip pain- walking at her past job \par sleep: 7 hrs or less\par \par Location: b/l temples, b/l orbital, posterior head \par Description: throbbing\par Associated symptoms:\par Triggers: extreme stress, cant work and needs to pay bills\par Comorbidities: None\par Other pain conditions: left leg pain from fall\par Imaging: n/a\par Medications: Tylenol, methylprednisone \par Family history:  daughter-headaches\par Allergies: NKA\par \par \par Symptoms: headache\par  \par FROM 12/24/20:\par Interview conducted in [place], with the assistance of the patient's . This is a 45-year-old right-handed woman who was admitted to [facility] during October 1-4, 2020 for sinus tachycardia and bitemporal headache. She has had decreased intensity of headache to 4-5/10 since being discharged from the hospital with amitriptyline 25mg PO QHS. She has also been taking propranolol 10mg PO BID as recommended by the Cardiology consultant for management of sinus tachycardia. 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 PCP: Dr. Erica Torres.\par \par FROM 3/25/21:\par Interview conducted in [place], with the assistance of the patient's . The patient, now 46, states that she does not regularly have headaches anymore, but can still have a headache with 5/10 intensity in times of stress. Propranolol 10mg PO BID has been switched to atenolol 50mg PO Daily by nephrologist, Dr. Regina Frankel, although the patient says that this doctor is her cardiologist. Soon after switching to atenolol, the patient experienced two episodes on 3/22 - 3/23 of fatigue and headache with vertical double vision for a few minutes each. During one of these episodes, she and her  measured her BP to be 117/57 (HR 60). She stopped taking amitriptyline about 3 weeks ago, but has not noticed any new headaches, anxiety, depression, or insomnia. She is continuing to attend physical therapy sessions twice per week to relieve her lower back and leg pain. She currently does not have pain in her right leg, although she has some in her left hip and left leg at present.\par \par FROM 6/25/21:\par Interview conducted in [place], with the assistance of the patient's . The patient continues to experience daily headaches at both temples, ranging from 5/10 to 10/10 in intensity. Headaches are triggered by stress and claustrophobia. The patient notes that her propranolol has been switched to atenolol by her PCP, and that she has been started on levothyroxine for low thyroid hormone levels, as well as completed a course of cephalexin to treat a presumed respiratory infections. She has also had a planned procedure for evaluation of the veins in her legs, to treat pain associated with them.\par \par FROM 9/28/21:\par Interview conducted in [place], with the assistance of the patient's . The patient states that over the past 2-3 weeks, she has been experiencing recurrence of daily headaches at the right posterior head and at both temples, waxing and waning throughout the day, described as hot and sometimes pressure-like, sometimes radiating to the back of her neck. She has been experiencing worsening left knee pain, and now walks with a limp. She is concerned that the medicines she has been prescribed for pain as needed (acetaminophen, ibuprofen, cyclobenzaprine) may be contributing to her headaches. She has only been taking atenolol 50mg on some days. \par  \par 
no

## 2023-06-01 NOTE — PHYSICAL EXAM
[FreeTextEntry1] : \par Physical Exam\par \par Constitutional: Patient was well-developed, well-nourished and in no acute distress. \par \par Head: Normocephalic, atraumatic. Tympanic membranes were clear. \par \par Neck: Supple with full range of motion. \par \par Respiratory: Lungs were clear. \par \par Abdomen: Soft and nontender. \par \par Spine: Nontender. \par \par Skin: There were no rashes. \par \par NEUROLOGICAL EXAMINATION:\par \par Mental Status: Patient was alert and oriented. Speech was fluent. There was no dysarthria.  Affect was normal.\par \par Cranial Nerves: \par \par II: Visual acuity was 20/30 -1 right and 20/70 left bilaterally with near card. Pt wears reading glasses but did not have them with her.  Pupils were equal and reactive. Visual fields were full. \par \par III, IV, VI: Eye movements were full without nystagmus. \par \par V: Facial sensation was intact. \par \par VII: Facial strength was normal. \par \par VIII: Hearing was equal. \par \par IX, X: Palatal movement was normal. Phonation was normal. \par \par XI: Sternocleidomastoids and trapezii were normal. \par \par XII: Tongue was midline and movements normal. There was no lingual atrophy or fasciculations. \par \par Motor Examination: Muscle bulk, tone and strength were normal. \par \par Sensory Examination: vibration and joint position sense were intact. \par \par Reflexes: DTRs were 2+ throughout. \par \par Coordination/Cerebellar Function: There was no dysmetria on finger to nose or heel to shin testing. \par \par Gait/Stance: Gait and tandem were normal. Romberg was negative.  Heel and toe test limited because of recent left hip pain from fall. \par

## 2023-06-01 NOTE — ASSESSMENT
[FreeTextEntry1] : This is a case of a 47 yo right handed female with PMH of lumbar radiculopathy, headaches, hyperalcoholemia, hypothyroidism, h.pylori presents today with b/l temple headaches.  Pt will start mag 400, B2 400, and COq-10. In the past the patient reports this helped.  At this time patient does not want to start any new daily medication besides the vitamins. Pt has been incompliant with medications. \par \par \par Plan: \par        {       } Stop taking ibuprofen due to stomach upset \par        {       } continue with Tylenol as needed\par        {       } f/u with PCP regarding blood work\par        {       } F/u with Opthalmology\par        {       } restart magnesium 400 mg, riboflavin 400 mg, COq-10\par        {       } neck pt \par        {      } work on weight loss, exercise, and diet\par \par    \par Headache education provided:\par [] Stay well hydrated\par [] Limit excessive caffeine and alcohol intake\par [] Maintain good sleep hygiene\par [] Try to avoid triggers\par [] Practice good eating habits\par [] Avoid excessive use of over the counter pain medications, as they can cause medication overuse headaches \par [] Keep a headache diary\par [] Relaxation techniques, biofeedback, massage therapy, acupunctures, and heating pads may be effective\par \par \par

## 2023-06-13 ENCOUNTER — APPOINTMENT (OUTPATIENT)
Dept: ORTHOPEDIC SURGERY | Facility: CLINIC | Age: 48
End: 2023-06-13
Payer: MEDICAID

## 2023-06-13 VITALS — WEIGHT: 285 LBS | HEIGHT: 66 IN | BODY MASS INDEX: 45.8 KG/M2

## 2023-06-13 PROCEDURE — 99213 OFFICE O/P EST LOW 20 MIN: CPT

## 2023-06-13 NOTE — PHYSICAL EXAM
[de-identified] : numbness right big toe [NL (0)] : extension 0 degrees [5___] : hamstring 5[unfilled]/5 [Equivocal] : equivocal Erinn [Left] : left knee [AP] : anteroposterior [Lateral] : lateral [TWNoteComboBox7] : flexion 115 degrees [] : metatarsophalangeal tenderness [1st] : 1st [Right] : right foot [There are no fractures, subluxations or dislocations. No significant abnormalities are seen] : There are no fractures, subluxations or dislocations. No significant abnormalities are seen

## 2023-06-13 NOTE — REASON FOR VISIT
[FreeTextEntry2] : 6/13/23- Has back pain radiating down both legs, numbness big toe right foot. PT not helping it

## 2023-06-13 NOTE — HISTORY OF PRESENT ILLNESS
[] : yes [de-identified] : 47 yo fm presents after fall on 4/29/23. She was shopping, slipped on floor, and since has had LBP that radiated down both legs to feet. She has numbness in her R big toe. She has L knee pain since the fall that’s sharp and intermittent. [FreeTextEntry1] : right foot/b/l hips/b/l knees [FreeTextEntry3] : 4/29/23 [FreeTextEntry5] : patient [place] and fell in [place]. her legs split and she fell on her both her knees.  [FreeTextEntry7] : into toes

## 2023-06-21 ENCOUNTER — FORM ENCOUNTER (OUTPATIENT)
Age: 48
End: 2023-06-21

## 2023-06-22 ENCOUNTER — APPOINTMENT (OUTPATIENT)
Dept: MRI IMAGING | Facility: CLINIC | Age: 48
End: 2023-06-22
Payer: MEDICAID

## 2023-06-22 PROCEDURE — 72148 MRI LUMBAR SPINE W/O DYE: CPT

## 2023-06-29 ENCOUNTER — FORM ENCOUNTER (OUTPATIENT)
Age: 48
End: 2023-06-29

## 2023-06-29 ENCOUNTER — APPOINTMENT (OUTPATIENT)
Dept: ORTHOPEDIC SURGERY | Facility: CLINIC | Age: 48
End: 2023-06-29
Payer: MEDICAID

## 2023-06-29 VITALS — WEIGHT: 285 LBS | BODY MASS INDEX: 45.8 KG/M2 | HEIGHT: 66 IN

## 2023-06-29 PROCEDURE — 99213 OFFICE O/P EST LOW 20 MIN: CPT

## 2023-06-29 NOTE — HISTORY OF PRESENT ILLNESS
[Dull/Aching] : dull/aching [de-identified] : 6/13/23- Has back pain radiating down both legs, numbness big toe right foot. PT not helping it\par \par 49 yo fm presents after fall on 4/29/23. She was shopping, slipped on floor, and since has had LBP that radiated down both legs to feet. She has numbness in her R big toe. She has L knee pain since the fall that’s sharp and intermittent. [] : no [FreeTextEntry1] : right foot/b/l hips/b/l knees [FreeTextEntry3] : 4/29/23 [FreeTextEntry5] : patient [place] and fell in [place]. her legs split and she fell on her both her knees.  [FreeTextEntry7] : into toes

## 2023-06-29 NOTE — REASON FOR VISIT
[FreeTextEntry2] : 6/29/23- Had MRI: IMPRESSION:\par Multilevel lumbar degenerative disc disease and facet osteoarthrosis.\par Disc herniation at L1-2 and L4-5 and a disc bulge at L2-3 without stenosis or nerve root compression.\par Disc bulge with a right-sided disc herniation and posterior element degenerative changes at L3-4 with mild \par central stenosis.\par Modic type II endplate changes adjacent to the L3-4 disc.

## 2023-06-29 NOTE — PHYSICAL EXAM
[NL (0)] : extension 0 degrees [5___] : hamstring 5[unfilled]/5 [Equivocal] : equivocal Erinn [Left] : left knee [AP] : anteroposterior [Lateral] : lateral [1st] : 1st [Right] : right foot [There are no fractures, subluxations or dislocations. No significant abnormalities are seen] : There are no fractures, subluxations or dislocations. No significant abnormalities are seen [de-identified] : numbness right big toe [Bilateral] : knee bilaterally [TWNoteComboBox7] : flexion 115 degrees [] : no swelling

## 2023-06-29 NOTE — ASSESSMENT
[FreeTextEntry1] : Had MRI, although has disc bulge to right at L3/4, has extensive degenerative changes as well. Will send her to Pain Management for LESIs. \par For the knees, needs MRIs to r/o internal derangement

## 2023-06-30 ENCOUNTER — APPOINTMENT (OUTPATIENT)
Dept: MRI IMAGING | Facility: CLINIC | Age: 48
End: 2023-06-30
Payer: MEDICAID

## 2023-06-30 PROCEDURE — 73721 MRI JNT OF LWR EXTRE W/O DYE: CPT | Mod: LT

## 2023-07-06 ENCOUNTER — FORM ENCOUNTER (OUTPATIENT)
Age: 48
End: 2023-07-06

## 2023-07-06 ENCOUNTER — APPOINTMENT (OUTPATIENT)
Dept: PAIN MANAGEMENT | Facility: CLINIC | Age: 48
End: 2023-07-06

## 2023-07-07 ENCOUNTER — APPOINTMENT (OUTPATIENT)
Dept: MRI IMAGING | Facility: CLINIC | Age: 48
End: 2023-07-07
Payer: MEDICAID

## 2023-07-07 PROCEDURE — 73721 MRI JNT OF LWR EXTRE W/O DYE: CPT | Mod: RT

## 2023-07-13 ENCOUNTER — APPOINTMENT (OUTPATIENT)
Dept: ORTHOPEDIC SURGERY | Facility: CLINIC | Age: 48
End: 2023-07-13
Payer: MEDICAID

## 2023-07-13 PROCEDURE — 99213 OFFICE O/P EST LOW 20 MIN: CPT

## 2023-07-13 NOTE — PHYSICAL EXAM
[Bilateral] : knee bilaterally [NL (0)] : extension 0 degrees [5___] : hamstring 5[unfilled]/5 [Equivocal] : equivocal Erinn [1st] : 1st [Right] : right foot [There are no fractures, subluxations or dislocations. No significant abnormalities are seen] : There are no fractures, subluxations or dislocations. No significant abnormalities are seen [de-identified] : numbness right big toe [TWNoteComboBox7] : flexion 115 degrees [] : no swelling

## 2023-07-13 NOTE — DATA REVIEWED
[MRI] : MRI [Right] : of the right [Knee] : knee [I reviewed the films/CD and agree] : I reviewed the films/CD and agree

## 2023-07-13 NOTE — REASON FOR VISIT
[FreeTextEntry2] : 7/13/23- Had MRIs: left knee shows tear lateral meniscus. Right knee shows minor medial meniscal degeneration\par 6/29/23- Had MRI: IMPRESSION:\par Multilevel lumbar degenerative disc disease and facet osteoarthrosis.\par Disc herniation at L1-2 and L4-5 and a disc bulge at L2-3 without stenosis or nerve root compression.\par Disc bulge with a right-sided disc herniation and posterior element degenerative changes at L3-4 with mild \par central stenosis.\par Modic type II endplate changes adjacent to the L3-4 disc.

## 2023-07-13 NOTE — HISTORY OF PRESENT ILLNESS
[Dull/Aching] : dull/aching [de-identified] : 6/13/23- Has back pain radiating down both legs, numbness big toe right foot. PT not helping it\par \par 49 yo fm presents after fall on 4/29/23. She was shopping, slipped on floor, and since has had LBP that radiated down both legs to feet. She has numbness in her R big toe. She has L knee pain since the fall that’s sharp and intermittent. [] : no [FreeTextEntry1] : right foot/b/l hips/b/l knees [FreeTextEntry3] : 4/29/23 [FreeTextEntry5] : patient [place] and fell in [place]. her legs split and she fell on her both her knees.  [FreeTextEntry7] : into toes

## 2023-07-13 NOTE — DISCUSSION/SUMMARY
[de-identified] : MRIs reviewed, the left knee may benefit from arthroscopic repair, I don't think the right knee warrants surgery. Will have her see Dr. Alvarez for arthroscopic consultation

## 2023-07-17 ENCOUNTER — APPOINTMENT (OUTPATIENT)
Dept: INTERNAL MEDICINE | Facility: CLINIC | Age: 48
End: 2023-07-17
Payer: MEDICAID

## 2023-07-17 VITALS
BODY MASS INDEX: 45.96 KG/M2 | WEIGHT: 286 LBS | OXYGEN SATURATION: 98 % | HEIGHT: 66 IN | RESPIRATION RATE: 17 BRPM | HEART RATE: 90 BPM | TEMPERATURE: 98 F | DIASTOLIC BLOOD PRESSURE: 79 MMHG | SYSTOLIC BLOOD PRESSURE: 124 MMHG

## 2023-07-17 DIAGNOSIS — R10.9 UNSPECIFIED ABDOMINAL PAIN: ICD-10-CM

## 2023-07-17 LAB
BILIRUB UR QL STRIP: NEGATIVE
CLARITY UR: CLEAR
COLLECTION METHOD: NORMAL
GLUCOSE UR-MCNC: NEGATIVE
HCG UR QL: 0.2 EU/DL
HGB UR QL STRIP.AUTO: NEGATIVE
KETONES UR-MCNC: NEGATIVE
LEUKOCYTE ESTERASE UR QL STRIP: NEGATIVE
NITRITE UR QL STRIP: NEGATIVE
PH UR STRIP: 5.5
PROT UR STRIP-MCNC: NEGATIVE
SP GR UR STRIP: 1.03

## 2023-07-17 PROCEDURE — G0447 BEHAVIOR COUNSEL OBESITY 15M: CPT | Mod: 59

## 2023-07-17 PROCEDURE — 36415 COLL VENOUS BLD VENIPUNCTURE: CPT

## 2023-07-17 PROCEDURE — 99204 OFFICE O/P NEW MOD 45 MIN: CPT | Mod: 25

## 2023-07-17 PROCEDURE — 81003 URINALYSIS AUTO W/O SCOPE: CPT | Mod: QW

## 2023-07-17 RX ORDER — METHYLPREDNISOLONE 4 MG/1
4 TABLET ORAL
Qty: 1 | Refills: 0 | Status: COMPLETED | COMMUNITY
Start: 2021-06-25 | End: 2023-07-17

## 2023-07-17 RX ORDER — ATENOLOL 50 MG/1
50 TABLET ORAL DAILY
Refills: 0 | Status: COMPLETED | COMMUNITY
End: 2023-07-17

## 2023-07-17 RX ORDER — ASPRIN AND CAFFEINE 400; 32 MG/1; MG/1
400-32 TABLET ORAL AT BEDTIME
Qty: 30 | Refills: 0 | Status: COMPLETED | COMMUNITY
Start: 2021-06-25 | End: 2023-07-17

## 2023-07-17 RX ORDER — ACETAMINOPHEN 500 MG/1
500 TABLET ORAL EVERY 6 HOURS
Qty: 120 | Refills: 0 | Status: COMPLETED | COMMUNITY
Start: 2021-09-28 | End: 2023-07-17

## 2023-07-17 RX ORDER — METHYLPREDNISOLONE 4 MG/1
4 TABLET ORAL
Qty: 1 | Refills: 0 | Status: COMPLETED | COMMUNITY
Start: 2023-05-09 | End: 2023-07-17

## 2023-07-17 RX ORDER — DIPHENHYDRAMINE HCL 25 MG/1
25 CAPSULE ORAL
Refills: 0 | Status: COMPLETED | COMMUNITY
End: 2023-07-17

## 2023-07-17 RX ORDER — LEVOTHYROXINE SODIUM 0.03 MG/1
25 TABLET ORAL
Refills: 0 | Status: COMPLETED | COMMUNITY
End: 2023-07-17

## 2023-07-17 RX ORDER — CYCLOBENZAPRINE HYDROCHLORIDE 5 MG/1
5 TABLET, FILM COATED ORAL
Qty: 30 | Refills: 0 | Status: COMPLETED | COMMUNITY
Start: 2021-09-28 | End: 2023-07-17

## 2023-07-17 RX ORDER — ASPIRIN 81 MG/1
81 TABLET, CHEWABLE ORAL
Qty: 30 | Refills: 0 | Status: COMPLETED | COMMUNITY
Start: 2020-12-24 | End: 2023-07-17

## 2023-07-17 RX ORDER — METHOCARBAMOL 750 MG/1
750 TABLET, FILM COATED ORAL
Qty: 30 | Refills: 2 | Status: COMPLETED | COMMUNITY
Start: 2023-05-09 | End: 2023-07-17

## 2023-07-17 RX ORDER — ATORVASTATIN CALCIUM 20 MG/1
20 TABLET, FILM COATED ORAL
Qty: 30 | Refills: 0 | Status: COMPLETED | COMMUNITY
Start: 2021-09-28 | End: 2023-07-17

## 2023-07-17 NOTE — PHYSICAL EXAM
[Declined Breast Exam] : declined breast exam  [Normal Anterior Cervical Nodes] : no anterior cervical lymphadenopathy [Normal] : affect was normal and insight and judgment were intact [No Acute Distress] : no acute distress [Normal Sclera/Conjunctiva] : normal sclera/conjunctiva [PERRL] : pupils equal round and reactive to light [EOMI] : extraocular movements intact [Normal Oropharynx] : the oropharynx was normal [No Lymphadenopathy] : no lymphadenopathy [No Respiratory Distress] : no respiratory distress  [No Accessory Muscle Use] : no accessory muscle use [Clear to Auscultation] : lungs were clear to auscultation bilaterally [Normal Rate] : normal rate  [Normal S1, S2] : normal S1 and S2 [Regular Rhythm] : with a regular rhythm [No Edema] : there was no peripheral edema [Soft] : abdomen soft [Non Tender] : non-tender [Non-distended] : non-distended [Normal Bowel Sounds] : normal bowel sounds [Declined Rectal Exam] : declined rectal exam [Normal Posterior Cervical Nodes] : no posterior cervical lymphadenopathy [No CVA Tenderness] : no CVA  tenderness [No Spinal Tenderness] : no spinal tenderness [Grossly Normal Strength/Tone] : grossly normal strength/tone [No Rash] : no rash [No Focal Deficits] : no focal deficits [Normal Affect] : the affect was normal [Alert and Oriented x3] : oriented to person, place, and time [Normal Insight/Judgement] : insight and judgment were intact [de-identified] : no Rebound RLQ or LLQ tenderness. no epigastric tenderness [de-identified] : declined [de-identified] : b/l paraspinal muscle tenderness, no spinal tenderness

## 2023-07-17 NOTE — ASSESSMENT
[FreeTextEntry1] : #Abdominal Discomfort\par - f/u cbc, cmp, Poct urine negative, f/u urine culture and hcg\par - US of abdomen\par - f/u gastro - following Dr. Brunner\par - reviewed previous endoscopy report\par #Severe obesity\par Diet and Exercise\par Setting realistic weight loss goals, such as a one- to two-pound weight loss per week.\par Eating fewer calories by cutting down on portion sizes. \par Aiming for at least five small handfuls of fruits and vegetables per day.\par Choosing foods high in fiber, such as whole grain breads, cereals, pasta, rice, fruits and vegetables. These foods will give you more chewing satisfaction, while the higher fiber content may make you feel hernandez on fewer calories.\par Keep Food Journal\par 15 min spent on obesity discussion\par #Lumbar Radiculopathy\par following pain management Dr. Yoder\par #Vaginal Pain\par - f/u OBGYN\par #History of temporal headache\par reviewed neurology notes\par continue current medication\par no acute complaints or focal neurological deficits\par f/u neurology.\par \par A total of 45 minutes including same day pre-visit chart review, face to face time with patient, coordination of care, and documentation was spent on this visit. \par \par

## 2023-07-17 NOTE — REVIEW OF SYSTEMS
[Abdominal Pain] : abdominal pain [Heartburn] : heartburn [Back Pain] : back pain [Negative] : Psychiatric [Nausea] : no nausea [Constipation] : no constipation [Diarrhea] : diarrhea [Vomiting] : no vomiting [Melena] : no melena [Joint Pain] : no joint pain [Joint Stiffness] : no joint stiffness [Joint Swelling] : no joint swelling [Muscle Weakness] : no muscle weakness [Muscle Pain] : no muscle pain [FreeTextEntry9] : chronic back pain

## 2023-07-17 NOTE — HEALTH RISK ASSESSMENT
[HIV Test offered] : HIV Test offered [Hepatitis C test offered] : Hepatitis C test offered [None] : None [With Family] : lives with family [Employed] : employed [] :  [Feels Safe at Home] : Feels safe at home [Fully functional (bathing, dressing, toileting, transferring, walking, feeding)] : Fully functional (bathing, dressing, toileting, transferring, walking, feeding) [Fully functional (using the telephone, shopping, preparing meals, housekeeping, doing laundry, using] : Fully functional and needs no help or supervision to perform IADLs (using the telephone, shopping, preparing meals, housekeeping, doing laundry, using transportation, managing medications and managing finances) [Reports normal functional visual acuity (ie: able to read med bottle)] : Reports normal functional visual acuity [Smoke Detector] : smoke detector [Carbon Monoxide Detector] : carbon monoxide detector [Safety elements used in home] : safety elements used in home [Seat Belt] :  uses seat belt [Sunscreen] : uses sunscreen [Change in mental status noted] : No change in mental status noted [Language] : denies difficulty with language [Behavior] : denies difficulty with behavior [Learning/Retaining New Information] : denies difficulty learning/retaining new information [Handling Complex Tasks] : denies difficulty handling complex tasks [Reasoning] : denies difficulty with reasoning [Spatial Ability and Orientation] : denies difficulty with spatial ability and orientation [Sexually Active] : not sexually active [High Risk Behavior] : no high risk behavior [Reports changes in hearing] : Reports no changes in hearing [Reports changes in vision] : Reports no changes in vision [Reports changes in dental health] : Reports no changes in dental health [Guns at Home] : no guns at home [Travel to Developing Areas] : does not  travel to developing areas [TB Exposure] : is not being exposed to tuberculosis [Caregiver Concerns] : does not have caregiver concerns [No] : No [No falls in past year] : Patient reported no falls in the past year [0] : 2) Feeling down, depressed, or hopeless: Not at all (0) [PHQ-2 Negative - No further assessment needed] : PHQ-2 Negative - No further assessment needed [Never] : Never [[id number]] : 0

## 2023-07-17 NOTE — HEALTH RISK ASSESSMENT
[HIV Test offered] : HIV Test offered [Hepatitis C test offered] : Hepatitis C test offered [None] : None [With Family] : lives with family [Employed] : employed [] :  [Feels Safe at Home] : Feels safe at home [Fully functional (bathing, dressing, toileting, transferring, walking, feeding)] : Fully functional (bathing, dressing, toileting, transferring, walking, feeding) [Fully functional (using the telephone, shopping, preparing meals, housekeeping, doing laundry, using] : Fully functional and needs no help or supervision to perform IADLs (using the telephone, shopping, preparing meals, housekeeping, doing laundry, using transportation, managing medications and managing finances) [Reports normal functional visual acuity (ie: able to read med bottle)] : Reports normal functional visual acuity [Smoke Detector] : smoke detector [Carbon Monoxide Detector] : carbon monoxide detector [Safety elements used in home] : safety elements used in home [Seat Belt] :  uses seat belt [Sunscreen] : uses sunscreen [Change in mental status noted] : No change in mental status noted [Language] : denies difficulty with language [Behavior] : denies difficulty with behavior [Learning/Retaining New Information] : denies difficulty learning/retaining new information [Handling Complex Tasks] : denies difficulty handling complex tasks [Reasoning] : denies difficulty with reasoning [Spatial Ability and Orientation] : denies difficulty with spatial ability and orientation [Sexually Active] : not sexually active [High Risk Behavior] : no high risk behavior [Reports changes in hearing] : Reports no changes in hearing [Reports changes in vision] : Reports no changes in vision [Reports changes in dental health] : Reports no changes in dental health [Guns at Home] : no guns at home [Travel to Developing Areas] : does not  travel to developing areas [TB Exposure] : is not being exposed to tuberculosis [Caregiver Concerns] : does not have caregiver concerns [No] : No [No falls in past year] : Patient reported no falls in the past year [0] : 2) Feeling down, depressed, or hopeless: Not at all (0) [PHQ-2 Negative - No further assessment needed] : PHQ-2 Negative - No further assessment needed [Never] : Never [[id number]] : 0

## 2023-07-17 NOTE — HISTORY OF PRESENT ILLNESS
[de-identified] : 48 year F here for Complete Physical Exam.\par Pt is daily exercising?  No\par Vaccinations:\par covid - yes \par flu - this fall\par tdap - reports in past 10 years\par shingles\par \par Screening:\par colonoscopy: due for colonoscopy \par last endoscopy  with Dr. 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  Previous PCP: Dr. Erica Torres \par lumbar radiculopathy, headaches, hyperalcoholemia, hypothyroidism, h.pylori presents today with b/l temple headaches. \par following neuro: Dr. Sims and Ortho: Dr. De La O\par -Allergies: NKDA\par - Medications:\par  mag 400, B2 400, and COq-10. \par - Surgical Hx:\par Appendectomy\par Ovarian Surgery\par - Family Hx:\par Mother: Liver Disease \par Father: TypeIIDM \par - Children: \par -Social\par ETOH - former\par Smoker -former\par Illicit Drug use - denies\par -Occupation:\par Pt has no acute complaints\par \par  [FreeTextEntry8] : 48 F here accompanied by \par The patient primary language is Colombian,   was offered to the patient, but she preferred her\par family member to interpret for her\par \par -Past Medical History: \par -2020: hx of Quantiferon positive for tuberculosis during her hospital stay, and has been prescribed isoniazid with vitamin B6 for 9 months.  Previous PCP: Dr. Erica Torres \par lumbar radiculopathy, headaches,  h.pylori previously treated\par following neuro: Dr. Sims and Ortho: Dr. De La O\par -Allergies: NKDA\par - Medications:\par  mag 400, B2 400, and COq-10. \par - Surgical Hx:\par Appendectomy\par Ovarian Surgery\par - Family Hx:\par Mother: Liver Disease \par Father: TypeIIDM \par - Children: \par -Social\par ETOH - no alcohol abuse\par Smoker -no smoking\par Illicit Drug use - denies\par -Occupation:  unemployed\par \par Screening:\par colonoscopy: due for colonoscopy \par last endoscopy  with Dr. Brunner for gastritis hx of h.pylori\par denies any unintentional weight loss, blood in stool, anemia or dysphagia of solids or liquids\par no family hx of colon cancer\par 48 year old female here\par \par C/o abdominal pain 3 months, hx of intermittent abdominal pain\par 10 intermittent \par LMP: 1 year ago\par Location: generalized.\par Described as: cramps.\par radiates sometimes to back \par Worsens with movement and rest\par better with nothing\par No fever, no chills.\par No nausea, no vomiting.\par Denies constipation.\par Pt last poop yesterday was yesterday - no blood and soft \par Denies vaginal discharge. however has intermittent lower vaginal pain\par Denies vaginal bleeding.\par Denies dysuria\par Denies any recent travel or sick contacts\par Denies any blood in stool.\par Denies any recent street food intake\par \par obgyn: needs obgyn\par last pap: need to f/u obgyn\par

## 2023-07-17 NOTE — PHYSICAL EXAM
[Declined Breast Exam] : declined breast exam  [Normal Anterior Cervical Nodes] : no anterior cervical lymphadenopathy [Normal] : affect was normal and insight and judgment were intact [No Acute Distress] : no acute distress [Normal Sclera/Conjunctiva] : normal sclera/conjunctiva [PERRL] : pupils equal round and reactive to light [EOMI] : extraocular movements intact [Normal Oropharynx] : the oropharynx was normal [No Lymphadenopathy] : no lymphadenopathy [No Respiratory Distress] : no respiratory distress  [No Accessory Muscle Use] : no accessory muscle use [Clear to Auscultation] : lungs were clear to auscultation bilaterally [Normal Rate] : normal rate  [Regular Rhythm] : with a regular rhythm [Normal S1, S2] : normal S1 and S2 [No Edema] : there was no peripheral edema [Soft] : abdomen soft [Non Tender] : non-tender [Non-distended] : non-distended [Normal Bowel Sounds] : normal bowel sounds [Declined Rectal Exam] : declined rectal exam [Normal Posterior Cervical Nodes] : no posterior cervical lymphadenopathy [No CVA Tenderness] : no CVA  tenderness [No Spinal Tenderness] : no spinal tenderness [No Rash] : no rash [Grossly Normal Strength/Tone] : grossly normal strength/tone [No Focal Deficits] : no focal deficits [Normal Affect] : the affect was normal [Alert and Oriented x3] : oriented to person, place, and time [Normal Insight/Judgement] : insight and judgment were intact [de-identified] : no Rebound RLQ or LLQ tenderness. no epigastric tenderness [de-identified] : declined [de-identified] : b/l paraspinal muscle tenderness, no spinal tenderness

## 2023-07-17 NOTE — HISTORY OF PRESENT ILLNESS
[de-identified] : 48 year F here for Complete Physical Exam.\par Pt is daily exercising?  No\par Vaccinations:\par covid - yes \par flu - this fall\par tdap - reports in past 10 years\par shingles\par \par Screening:\par colonoscopy: due for colonoscopy \par last endoscopy  with Dr. 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  Previous PCP: Dr. Erica Torres \par lumbar radiculopathy, headaches, hyperalcoholemia, hypothyroidism, h.pylori presents today with b/l temple headaches. \par following neuro: Dr. Sims and Ortho: Dr. De La O\par -Allergies: NKDA\par - Medications:\par  mag 400, B2 400, and COq-10. \par - Surgical Hx:\par Appendectomy\par Ovarian Surgery\par - Family Hx:\par Mother: Liver Disease \par Father: TypeIIDM \par - Children: \par -Social\par ETOH - former\par Smoker -former\par Illicit Drug use - denies\par -Occupation:\par Pt has no acute complaints\par \par  [FreeTextEntry8] : 48 F here accompanied by \par The patient primary language is English,   was offered to the patient, but she preferred her\par family member to interpret for her\par \par -Past Medical History: \par -2020: hx of Quantiferon positive for tuberculosis during her hospital stay, and has been prescribed isoniazid with vitamin B6 for 9 months.  Previous PCP: Dr. Erica Torres \par lumbar radiculopathy, headaches,  h.pylori previously treated\par following neuro: Dr. Sims and Ortho: Dr. De La O\par -Allergies: NKDA\par - Medications:\par  mag 400, B2 400, and COq-10. \par - Surgical Hx:\par Appendectomy\par Ovarian Surgery\par - Family Hx:\par Mother: Liver Disease \par Father: TypeIIDM \par - Children: \par -Social\par ETOH - no alcohol abuse\par Smoker -no smoking\par Illicit Drug use - denies\par -Occupation:  unemployed\par \par Screening:\par colonoscopy: due for colonoscopy \par last endoscopy  with Dr. Brunner for gastritis hx of h.pylori\par denies any unintentional weight loss, blood in stool, anemia or dysphagia of solids or liquids\par no family hx of colon cancer\par 48 year old female here\par \par C/o abdominal pain 3 months, hx of intermittent abdominal pain\par 10 intermittent \par LMP: 1 year ago\par Location: generalized.\par Described as: cramps.\par radiates sometimes to back \par Worsens with movement and rest\par better with nothing\par No fever, no chills.\par No nausea, no vomiting.\par Denies constipation.\par Pt last poop yesterday was yesterday - no blood and soft \par Denies vaginal discharge. however has intermittent lower vaginal pain\par Denies vaginal bleeding.\par Denies dysuria\par Denies any recent travel or sick contacts\par Denies any blood in stool.\par Denies any recent street food intake\par \par obgyn: needs obgyn\par last pap: need to f/u obgyn\par

## 2023-07-18 ENCOUNTER — NON-APPOINTMENT (OUTPATIENT)
Age: 48
End: 2023-07-18

## 2023-07-18 LAB
25(OH)D3 SERPL-MCNC: 19.5 NG/ML
ALBUMIN SERPL ELPH-MCNC: 4.4 G/DL
ALP BLD-CCNC: 99 U/L
ALT SERPL-CCNC: 45 U/L
ANION GAP SERPL CALC-SCNC: 15 MMOL/L
AST SERPL-CCNC: 41 U/L
BILIRUB SERPL-MCNC: <0.2 MG/DL
BUN SERPL-MCNC: 14 MG/DL
CALCIUM SERPL-MCNC: 9.7 MG/DL
CHLORIDE SERPL-SCNC: 106 MMOL/L
CHOLEST SERPL-MCNC: 172 MG/DL
CO2 SERPL-SCNC: 21 MMOL/L
CREAT SERPL-MCNC: 0.79 MG/DL
EGFR: 91 ML/MIN/1.73M2
ESTIMATED AVERAGE GLUCOSE: 146 MG/DL
FOLATE SERPL-MCNC: 9 NG/ML
GLUCOSE SERPL-MCNC: 91 MG/DL
HBA1C MFR BLD HPLC: 6.7 %
HCG SERPL-MCNC: 1 MIU/ML
HDLC SERPL-MCNC: 42 MG/DL
IRON SATN MFR SERPL: 17 %
IRON SERPL-MCNC: 63 UG/DL
LDLC SERPL CALC-MCNC: 98 MG/DL
NONHDLC SERPL-MCNC: 131 MG/DL
POTASSIUM SERPL-SCNC: 5 MMOL/L
PROT SERPL-MCNC: 7.1 G/DL
SODIUM SERPL-SCNC: 142 MMOL/L
TIBC SERPL-MCNC: 377 UG/DL
TRIGL SERPL-MCNC: 192 MG/DL
TSH SERPL-ACNC: 2.94 UIU/ML
UIBC SERPL-MCNC: 314 UG/DL
VIT B12 SERPL-MCNC: 650 PG/ML

## 2023-07-19 ENCOUNTER — APPOINTMENT (OUTPATIENT)
Dept: NEUROLOGY | Facility: CLINIC | Age: 48
End: 2023-07-19
Payer: MEDICAID

## 2023-07-19 VITALS
SYSTOLIC BLOOD PRESSURE: 123 MMHG | HEIGHT: 66 IN | HEART RATE: 75 BPM | WEIGHT: 286 LBS | DIASTOLIC BLOOD PRESSURE: 80 MMHG | BODY MASS INDEX: 45.96 KG/M2 | TEMPERATURE: 97.9 F | OXYGEN SATURATION: 96 %

## 2023-07-19 DIAGNOSIS — Z78.9 OTHER SPECIFIED HEALTH STATUS: ICD-10-CM

## 2023-07-19 DIAGNOSIS — H53.8 OTHER VISUAL DISTURBANCES: ICD-10-CM

## 2023-07-19 PROCEDURE — 99215 OFFICE O/P EST HI 40 MIN: CPT

## 2023-07-19 PROCEDURE — G2212 PROLONG OUTPT/OFFICE VIS: CPT

## 2023-07-20 DIAGNOSIS — E55.9 VITAMIN D DEFICIENCY, UNSPECIFIED: ICD-10-CM

## 2023-07-20 LAB — BACTERIA UR CULT: NORMAL

## 2023-07-20 RX ORDER — ERGOCALCIFEROL 1.25 MG/1
1.25 MG CAPSULE ORAL
Qty: 8 | Refills: 0 | Status: ACTIVE | COMMUNITY
Start: 2023-07-20 | End: 1900-01-01

## 2023-07-26 ENCOUNTER — RESULT REVIEW (OUTPATIENT)
Age: 48
End: 2023-07-26

## 2023-07-31 ENCOUNTER — APPOINTMENT (OUTPATIENT)
Dept: PULMONOLOGY | Facility: CLINIC | Age: 48
End: 2023-07-31
Payer: MEDICAID

## 2023-07-31 VITALS
HEART RATE: 80 BPM | OXYGEN SATURATION: 94 % | DIASTOLIC BLOOD PRESSURE: 76 MMHG | SYSTOLIC BLOOD PRESSURE: 135 MMHG | HEIGHT: 66 IN | TEMPERATURE: 97.7 F | WEIGHT: 287 LBS | BODY MASS INDEX: 46.12 KG/M2

## 2023-07-31 PROCEDURE — ZZZZZ: CPT

## 2023-07-31 PROCEDURE — 36415 COLL VENOUS BLD VENIPUNCTURE: CPT

## 2023-07-31 PROCEDURE — 99244 OFF/OP CNSLTJ NEW/EST MOD 40: CPT | Mod: 25

## 2023-07-31 RX ORDER — FAMOTIDINE 40 MG/1
40 TABLET, FILM COATED ORAL
Qty: 180 | Refills: 3 | Status: ACTIVE | COMMUNITY
Start: 2023-07-31 | End: 1900-01-01

## 2023-07-31 NOTE — HISTORY OF PRESENT ILLNESS
[TextBox_4] : Irina is a pleasant 48-year-old female with history of hypercholesterolemia, she came in complaining of cough, posterior upper back pain for years, she also has heartburn, has been complaining of excessive daytime sleepiness and snoring for years.  She was treated with isoniazid for 9 months for latent tuberculosis infection in 2020.  She was diagnosed with asthma by her previous pulmonary doctor, was put on Ventolin HFA as needed for shortness of breath.   She is a non-smoker.

## 2023-07-31 NOTE — REASON FOR VISIT
[Initial] : an initial visit [Cough] : cough [Hypersomnolence] : hypersomnolence [TextBox_13] : Dr. Tyler Stokes

## 2023-07-31 NOTE — ASSESSMENT
[FreeTextEntry1] : Venipuncture with labs drawn in office Start Pepcid 40 mg p.o. twice daily for possible GERD. Obtain chest x-ray for further evaluation. Get Home sleep study to definitively rule out obstructive sleep apnea. Also advised her to closely follow with you clinically.

## 2023-07-31 NOTE — CONSULT LETTER
[Dear  ___] : Dear  [unfilled], [Courtesy Letter:] : I had the pleasure of seeing your patient, [unfilled], in my office today. [Please see my note below.] : Please see my note below. [Consult Closing:] : Thank you very much for allowing me to participate in the care of this patient.  If you have any questions, please do not hesitate to contact me. [Sincerely,] : Sincerely, [FreeTextEntry3] : Dr. Delfina Diaz

## 2023-07-31 NOTE — DISCUSSION/SUMMARY
[FreeTextEntry1] : Irina is a patient with cough and atypical posterior chest pain and heartburn, possibly secondary to GERD, rule out pneumonia (unlikely), rule out PE (unlikely).  Secondly, she is a patient with excessive daytime sleepiness and snoring for years,

## 2023-08-04 ENCOUNTER — APPOINTMENT (OUTPATIENT)
Dept: PAIN MANAGEMENT | Facility: CLINIC | Age: 48
End: 2023-08-04
Payer: MEDICAID

## 2023-08-04 VITALS — WEIGHT: 287 LBS | BODY MASS INDEX: 46.12 KG/M2 | HEIGHT: 66 IN

## 2023-08-04 PROCEDURE — 99244 OFF/OP CNSLTJ NEW/EST MOD 40: CPT

## 2023-08-04 NOTE — DISCUSSION/SUMMARY
[de-identified] : After discussing various treatment options with the patient including but not limited to oral medications, physical therapy, exercise modalities as well as interventional spinal injections, we have decided with the following plan:  - Continue Home exercises, stretching, activity modification, physical therapy, and conservative care. - MRI report and/or images was reviewed and discussed with the patient. - Recommend L4-5 Lumbar Epidural Steroid Injection under fluoroscopic guidance with image. - The risks, benefits and alternatives of the proposed procedure were explained in detail with the patient. The risks outlined include but are not limited to infection, bleeding, post-dural puncture headache, nerve injury, a temporary increase in pain, failure to resolve symptoms, allergic reaction, symptom recurrence, and possible elevation of blood sugar in diabetics. All questions were answered to patient's apparent satisfaction and he/she verbalized an understanding. - Patient is presenting with acute/sub-acute radicular pain with impairment in ADLs and functionality.  The pain has not responded to conservative care including NSAID therapy and/or physical therapy.  There is no bleeding tendency, unstable medical condition, or systemic infection. - Follow up in 1-2 weeks post injection for re-evaluation. *pt takes 81mg Aspirin

## 2023-08-04 NOTE — HISTORY OF PRESENT ILLNESS
[Lower back] : lower back [Neck] : neck [Mid-back] : mid-back [10] : 10 [9] : 9 [Burning] : burning [Sharp] : sharp [Shooting] : shooting [Stabbing] : stabbing [Intermittent] : intermittent [Household chores] : household chores [Sleep] : sleep [Nothing helps with pain getting better] : Nothing helps with pain getting better [Standing] : standing [Walking] : walking [Lying in bed] : lying in bed [FreeTextEntry1] : Initial HPI 08/04/23: Pain started a few months ago and is on the BILATERAL lower back and radiates into the bilateral buttocks and down the bilateral posterior thighs and lower legs to the toes on the right and to the ankle on the left, described as a sharp pulling pain with associated numbness and tingling.   MRI Lumbar Spine 6/22/23 independently reviewed: L3-4 disc bulge with right HNP and mild central stenosis; L3-4 left facet synovial cyst; L4-5 central HNP  Conservative Care: has done PT with temporary relief  Pain Medications: completed MDP with temporary relief; Methocarbamol PRN  Past Injections: none Spine surgery: none  Blood thinners: *pt takes 81mg Aspirin  [] : This patient has had an injection before: no [FreeTextEntry7] : b/l legs  [de-identified] : L MRI

## 2023-08-04 NOTE — PHYSICAL EXAM
[de-identified] : Constitutional; Appears well, no apparent distress Ability to communicate: Normal  Respiratory: non-labored breathing Skin: No rash noted Head: Normocephalic, atraumatic Neck: no visible thyroid enlargement Eyes: Extraocular movements intact Neurologic: Alert and oriented x3 Psychiatric: normal mood, affect and behavior [Flexion] : flexion [Extension] : extension [] : light touch intact throughout both lower extremities

## 2023-08-08 LAB
ALBUMIN SERPL ELPH-MCNC: 4.5 G/DL
ALP BLD-CCNC: 98 U/L
ALT SERPL-CCNC: 41 U/L
ANION GAP SERPL CALC-SCNC: 12 MMOL/L
AST SERPL-CCNC: 40 U/L
BILIRUB SERPL-MCNC: 0.2 MG/DL
BUN SERPL-MCNC: 8 MG/DL
CALCIUM SERPL-MCNC: 9.5 MG/DL
CHLORIDE SERPL-SCNC: 107 MMOL/L
CO2 SERPL-SCNC: 21 MMOL/L
CREAT SERPL-MCNC: 0.81 MG/DL
CRP SERPL-MCNC: 13 MG/L
DEPRECATED D DIMER PPP IA-ACNC: 151 NG/ML DDU
EGFR: 88 ML/MIN/1.73M2
ERYTHROCYTE [SEDIMENTATION RATE] IN BLOOD BY WESTERGREN METHOD: 67 MM/HR
GLUCOSE SERPL-MCNC: 99 MG/DL
POTASSIUM SERPL-SCNC: 4.1 MMOL/L
PROT SERPL-MCNC: 7.7 G/DL
SODIUM SERPL-SCNC: 140 MMOL/L

## 2023-08-11 ENCOUNTER — APPOINTMENT (OUTPATIENT)
Dept: ORTHOPEDIC SURGERY | Facility: CLINIC | Age: 48
End: 2023-08-11
Payer: MEDICAID

## 2023-08-11 VITALS — BODY MASS INDEX: 46.12 KG/M2 | WEIGHT: 287 LBS | HEIGHT: 66 IN

## 2023-08-11 PROCEDURE — J3490M: CUSTOM

## 2023-08-11 PROCEDURE — 99214 OFFICE O/P EST MOD 30 MIN: CPT | Mod: 25

## 2023-08-11 PROCEDURE — 20610 DRAIN/INJ JOINT/BURSA W/O US: CPT | Mod: 50

## 2023-08-11 NOTE — IMAGING
[de-identified] : The patient is a well appearing 48 year old female of their stated age. Patient ambulates with a cane Negative straight leg raise bilateral  Effected Knee:    b/l                	 ROM:  0-130 degrees  Lachman: Negative Pivot Shift: Negative Anterior Drawer: Negative Posterior Drawer / Sag: Negative Varus Stress 0 degrees: Stable Varus Stress 30 degrees: Stable Valgus Stress 0 degrees: Stable Valgus Stress 30 degrees: Stable Medial Fransico: Positive Lateral Fransico: Negative Patella Glide: 2+ Patella Apprehension: Negative Patella Grind: Negative  Palpation: Medial Joint Line: TTP Lateral Joint Line: Nontender Medial Collateral Ligament: Nontender Lateral Collateral Ligament/PLC: Nontender Distal Femur: Nontender Proximal Tibia: Nontender Tibial Tubercle: Nontender Distal Pole Patella: Nontender Quadriceps Tendon: Nontender &  Intact Patella Tendon: Nontender &  Intact Medial Distal Hamstring/PES: Nontender Lateral Distal Hamstring: Nontender & Stable Iliotibial Band: Nontender Medial Patellofemoral Ligament: Nontender Adductor: Nontender Proximal GSC-Plantaris: Nontender Calf: Supple & Nontender  Inspection: Deformity: No Erythema: No Ecchymosis: No Abrasions: No Effusion: Moderate Prepatellar Bursitis: No  Neurologic Exam: Sensation L4-S1: Grossly Intact  Motor Exam: Quadriceps: 5 out of 5 Hamstrings: 5 out of 5 EHL: 5 out of 5 FHL: 5 out of 5 TA: 5 out of 5 GS: 5 out of 5  Circulatory/Pulses: Dorsalis Pedis: 2+ Posterior Tibialis: 2+  Additional Pertinent Findings: None  Contralateral Knee:               	 ROM: 0-130 degrees  Other Pertinent Findings: None

## 2023-08-11 NOTE — DISCUSSION/SUMMARY
[de-identified] : SERVANDO and I discussed Her issues. Discussed findings of today's exam and possible causes of patient's pain. Educated patient on their most probable diagnosis of mild b/l knee arthritis and b/l MMT, LMT. Reviewed possible courses of treatment, and we collaboratively decided best course of treatment at this time will include:  1. b/l knee csi 2. c/w PT 3. NSAIDs prn   Instructions: Dx / Natural History The patient was advised of the diagnosis.  The natural history of the pathology was explained in full to the patient in layman's terms.  Several different treatment options were discussed and explained in full to the patient including the risks and benefits of both surgical and non-surgical treatments.  All questions and concerns were answered.   RICE I explained to the patient that rest, ice, compression, and elevation would benefit them.  They may return to activity after follow-up or when they no longer have any pain.  NSAIDs - OTC Patient is to begin over the counter oral anti-inflammatory medications on an as needed basis, as long as there are no medical contraindications.  Patient is counseled on possible GI and blood pressure side effects.  Pain Guide Activities The patient was advised to let pain guide the gradual advancement of activities.  Icing The patient was advised to apply ice (wrapped in a towel or protective covering) to the area daily (20 minutes at a time, 2-4X/day).  All of the patient's questions were answered to Her satisfaction. Diagnoses and potential treatments were reviewed. She agreed with the plan and would like to move forward with it.  Follow up 3 months.  Pb Alvarez MD Sports Medicine, Shoulder & Elbow Surgery [facility]

## 2023-08-11 NOTE — HISTORY OF PRESENT ILLNESS
[5] : 5 [Dull/Aching] : dull/aching [Shooting] : shooting [Tingling] : tingling [] : yes [Intermittent] : intermittent [Meds] : meds [Standing] : standing [Walking] : walking [Stairs] : stairs [de-identified] : 8/11/23: Patient is a 47yo F presenting for evaluation of b/l knee pain, L>R. Saw Dr. De La O, has been doing PT and complains pain has worsened since beginning Pt. Not working. She has been using cane to ambulate since 4/29 after she fell. Reports clicking and popping.  PMH: pre-diabetic  [FreeTextEntry1] : left knee  [FreeTextEntry5] : SERVANDO FAITH is a 48-year-old female here for bilateral knees, pt slipped and fell in 4/2023,  last saw   [FreeTextEntry6] : numbness  [FreeTextEntry7] : bilateral knee to hip  [de-identified] : 07/13/23 [de-identified] : Jaylyn  [de-identified] : xrays

## 2023-08-16 ENCOUNTER — OUTPATIENT (OUTPATIENT)
Dept: OUTPATIENT SERVICES | Facility: HOSPITAL | Age: 48
LOS: 1 days | End: 2023-08-16
Payer: MEDICAID

## 2023-08-16 ENCOUNTER — LABORATORY RESULT (OUTPATIENT)
Age: 48
End: 2023-08-16

## 2023-08-16 ENCOUNTER — APPOINTMENT (OUTPATIENT)
Dept: OBGYN | Facility: CLINIC | Age: 48
End: 2023-08-16
Payer: MEDICAID

## 2023-08-16 VITALS — WEIGHT: 283 LBS | SYSTOLIC BLOOD PRESSURE: 130 MMHG | DIASTOLIC BLOOD PRESSURE: 84 MMHG | BODY MASS INDEX: 45.68 KG/M2

## 2023-08-16 DIAGNOSIS — N95.2 POSTMENOPAUSAL ATROPHIC VAGINITIS: ICD-10-CM

## 2023-08-16 DIAGNOSIS — Z34.80 ENCOUNTER FOR SUPERVISION OF OTHER NORMAL PREGNANCY, UNSPECIFIED TRIMESTER: ICD-10-CM

## 2023-08-16 DIAGNOSIS — Z90.49 ACQUIRED ABSENCE OF OTHER SPECIFIED PARTS OF DIGESTIVE TRACT: Chronic | ICD-10-CM

## 2023-08-16 DIAGNOSIS — R10.9 UNSPECIFIED ABDOMINAL PAIN: ICD-10-CM

## 2023-08-16 DIAGNOSIS — R10.2 PELVIC AND PERINEAL PAIN: ICD-10-CM

## 2023-08-16 LAB
BILIRUB UR QL STRIP: NORMAL
CLARITY UR: CLEAR
COLLECTION METHOD: NORMAL
GLUCOSE UR-MCNC: NORMAL
HCG UR QL: 0.2 EU/DL
HGB UR QL STRIP.AUTO: NORMAL
KETONES UR-MCNC: NORMAL
LEUKOCYTE ESTERASE UR QL STRIP: NORMAL
NITRITE UR QL STRIP: NORMAL
PH UR STRIP: 5
PROT UR STRIP-MCNC: NORMAL
SP GR UR STRIP: 1.03

## 2023-08-16 PROCEDURE — 81002 URINALYSIS NONAUTO W/O SCOPE: CPT

## 2023-08-16 PROCEDURE — 99213 OFFICE O/P EST LOW 20 MIN: CPT | Mod: GE,25

## 2023-08-16 PROCEDURE — 88175 CYTOPATH C/V AUTO FLUID REDO: CPT

## 2023-08-16 PROCEDURE — 87624 HPV HI-RISK TYP POOLED RSLT: CPT

## 2023-08-16 PROCEDURE — G0463: CPT

## 2023-08-16 RX ORDER — CONJUGATED ESTROGENS 0.62 MG/G
0.62 CREAM VAGINAL DAILY
Qty: 1 | Refills: 0 | Status: ACTIVE | COMMUNITY
Start: 2023-08-16 | End: 1900-01-01

## 2023-08-17 LAB — HPV HIGH+LOW RISK DNA PNL CVX: SIGNIFICANT CHANGE UP

## 2023-08-17 NOTE — HISTORY OF PRESENT ILLNESS
[FreeTextEntry1] : Patient is 48Y  presenting as a new patient for vaginal pain. Patient declined , preferred spouse translating. Reports 1 year of 5/10 lower abdominal and vaginal pain. Worse with movement, better when lying flat. Reports mild dysuria which has improved, denies frequency, urgency. Reports some bloating and increased constipation. Patient is menopausal, LMP in . Denies any spotting or abnormal vaginal discharge. Infrequent sexual activity, however monogamous with . Reports some vaginal irritation, denies dyspareunia.   OBHx: 3x , 1x ectopic GynHx: Hx of 3.3cm R adnexal mass (on CT A/P ). Denies history of fibroids, STDs, abnormal PAP.  PMH: lumbar radiculopathy, headaches, h.pylori previously treated PSH: appendectomy, left salpingoophorectomy for ectopic () Meds: mag 400, B2 400, and COq-10. Allergies: NKDA FH: Mother (Liver Disease ), Father: Type II DM  SH: Denies alcohol, smoking, drugs  Health maintenance: - Colonoscopy: due, follows with GI - PAP: due, perform today - Mammogram: due, will provide referral

## 2023-08-17 NOTE — PLAN
[FreeTextEntry1] : Patient is 48Y menopausal female presenting with vaginal pain and irritation.  #Vaginal pain Likely vulvovaginal atrophy secondary to menopause. On exam, atrophic vaginal tissue with scant discharge. No evidence of leukocytes or nitrates on urine dipstick, low concern for UTI. Considering history of ovarian cyst and mild pain on palpation of RLQ, will obtain further imaging for evaluation. Bimanual exam limited by body habitus. - Topical vaginal estrogen prescribed - US Pelvic Complete  #Health maintenance - PAP and co-testing performed today - Mammogram ordered  d/w Dr. Cleve Milan, PGY-1

## 2023-08-17 NOTE — REVIEW OF SYSTEMS
[Abdominal Pain] : abdominal pain [Constipation] : constipation [Heartburn] : heartburn [Bloating] : bloating [Pelvic pain] : pelvic pain [Negative] : Cardiovascular [Diarrhea] : diarrhea [Vomiting] : no vomiting [Melena] : no melena [Nausea] : no nausea [Urgency] : no urgency [Frequency] : no frequency [Incontinence] : no incontinence [Dysuria] : no dysuria [Urethral Discharge] : no urethral discharge [Abn Vaginal bleeding] : no abnormal vaginal bleeding [Breast Pain] : no breast pain [Breast Lump] : no breast lump [Nipple Changes] : no nipple changes [Nipple Discharge] : no nipple discharge [Skin Lesion on Breast] : no skin lesion on breast

## 2023-08-17 NOTE — REASON FOR VISIT
[Initial] : an initial consultation for [Menopausal Symptoms] : menopausal symptoms [Spouse] : spouse [FreeTextEntry2] : vaginal pain

## 2023-08-17 NOTE — PHYSICAL EXAM
[Chaperone Present] : A chaperone was present in the examining room during all aspects of the physical examination [Examination Of The Breasts] : a normal appearance [No Masses] : no breast masses were palpable [Labia Majora] : normal [Labia Minora] : normal [Atrophy] : atrophy [Dry Mucosa] : dry mucosa [Normal] : normal [FreeTextEntry8] : Very limited by body habitus

## 2023-08-18 NOTE — ED ADULT TRIAGE NOTE - CCCP TRG CHIEF CMPLNT
vomiting ANESTHESIA POSTOP CHECK    72y Female POSTOP DAY 1     Vital Signs Last 24 Hrs  T(C): 36.7 (18 Aug 2023 10:22), Max: 37.1 (17 Aug 2023 18:00)  T(F): 98 (18 Aug 2023 10:22), Max: 98.8 (17 Aug 2023 18:00)  HR: 58 (18 Aug 2023 10:22) (56 - 61)  BP: 155/73 (18 Aug 2023 10:22) (117/60 - 155/73)  BP(mean): --  RR: 16 (18 Aug 2023 10:22) (16 - 18)  SpO2: 97% (18 Aug 2023 10:22) (96% - 98%)    Parameters below as of 18 Aug 2023 09:55  Patient On (Oxygen Delivery Method): room air      I&O's Summary    17 Aug 2023 07:01  -  18 Aug 2023 07:00  --------------------------------------------------------  IN: 2400 mL / OUT: 0 mL / NET: 2400 mL        [X ] NO APPARENT ANESTHESIA COMPLICATIONS      Comments:

## 2023-08-19 LAB — CYTOLOGY SPEC DOC CYTO: SIGNIFICANT CHANGE UP

## 2023-08-22 ENCOUNTER — APPOINTMENT (OUTPATIENT)
Dept: ORTHOPEDIC SURGERY | Facility: CLINIC | Age: 48
End: 2023-08-22
Payer: MEDICAID

## 2023-08-22 VITALS — WEIGHT: 283 LBS | HEIGHT: 66 IN | BODY MASS INDEX: 45.48 KG/M2

## 2023-08-22 DIAGNOSIS — S33.9XXA SPRAIN OF UNSPECIFIED PARTS OF LUMBAR SPINE AND PELVIS, INITIAL ENCOUNTER: ICD-10-CM

## 2023-08-22 PROCEDURE — 99213 OFFICE O/P EST LOW 20 MIN: CPT

## 2023-08-22 NOTE — PHYSICAL EXAM
[Bilateral] : knee bilaterally [NL (0)] : extension 0 degrees [5___] : hamstring 5[unfilled]/5 [Equivocal] : equivocal Erinn [1st] : 1st [Right] : right foot [There are no fractures, subluxations or dislocations. No significant abnormalities are seen] : There are no fractures, subluxations or dislocations. No significant abnormalities are seen [de-identified] : numbness right big toe [TWNoteComboBox7] : flexion 115 degrees [] : no swelling

## 2023-08-22 NOTE — DISCUSSION/SUMMARY
[de-identified] : Follow up with Dr. Alvarez as scheduled. Recommend she continue PT, stretching and HEP.  She will follow up for spine injections with Dr. Yoder when authorized. She has muscle relaxer for qhs.  Tylenol prn, cannot tolerate nsaids with GI distress.   Return 6 weeks.

## 2023-08-22 NOTE — HISTORY OF PRESENT ILLNESS
[Dull/Aching] : dull/aching [de-identified] : 8/22/23- f/u with continued pain low back and knees.  Overall, no change. PT has been helping her manage. Spine injections pain mgmt are pending insurance auth for Dr. Yoder.  She saw Dr. Alvarez who gave her CSI bilateral knees.  Pain persists with ambulation, using cane. He will follow up response to injections and PT.   7/13/23- Had MRIs: left knee shows tear lateral meniscus. Right knee shows minor medial meniscal degeneration  6/29/23- Had MRI: IMPRESSION: Multilevel lumbar degenerative disc disease and facet osteoarthrosis. Disc herniation at L1-2 and L4-5 and a disc bulge at L2-3 without stenosis or nerve root compression. Disc bulge with a right-sided disc herniation and posterior element degenerative changes at L3-4 with mild  central stenosis. Modic type II endplate changes adjacent to the L3-4 disc.    6/13/23- Has back pain radiating down both legs, numbness big toe right foot. PT not helping it  49 yo fm presents after fall on 4/29/23. She was shopping, slipped on floor, and since has had LBP that radiated down both legs to feet. She has numbness in her R big toe. She has L knee pain since the fall that's sharp and intermittent. [] : no [FreeTextEntry1] : right foot/b/l hips/b/l knees [FreeTextEntry3] : 4/29/23 [FreeTextEntry5] : patient [place] and fell in [place]. her legs split and she fell on her both her knees.  [FreeTextEntry7] : into toes

## 2023-08-23 ENCOUNTER — APPOINTMENT (OUTPATIENT)
Dept: MRI IMAGING | Facility: IMAGING CENTER | Age: 48
End: 2023-08-23
Payer: MEDICAID

## 2023-08-23 ENCOUNTER — OUTPATIENT (OUTPATIENT)
Dept: OUTPATIENT SERVICES | Facility: HOSPITAL | Age: 48
LOS: 1 days | End: 2023-08-23
Payer: MEDICAID

## 2023-08-23 DIAGNOSIS — R51.9 HEADACHE, UNSPECIFIED: ICD-10-CM

## 2023-08-23 DIAGNOSIS — Z90.49 ACQUIRED ABSENCE OF OTHER SPECIFIED PARTS OF DIGESTIVE TRACT: Chronic | ICD-10-CM

## 2023-08-24 PROCEDURE — 70553 MRI BRAIN STEM W/O & W/DYE: CPT

## 2023-08-24 PROCEDURE — 70553 MRI BRAIN STEM W/O & W/DYE: CPT | Mod: 26

## 2023-08-24 PROCEDURE — 70544 MR ANGIOGRAPHY HEAD W/O DYE: CPT | Mod: 26,76,XU

## 2023-08-24 PROCEDURE — 70544 MR ANGIOGRAPHY HEAD W/O DYE: CPT | Mod: 26,XU

## 2023-08-24 PROCEDURE — 70543 MRI ORBT/FAC/NCK W/O &W/DYE: CPT | Mod: 26

## 2023-08-24 PROCEDURE — 70544 MR ANGIOGRAPHY HEAD W/O DYE: CPT

## 2023-08-24 PROCEDURE — A9585: CPT

## 2023-08-24 PROCEDURE — 70543 MRI ORBT/FAC/NCK W/O &W/DYE: CPT

## 2023-08-25 DIAGNOSIS — N95.2 POSTMENOPAUSAL ATROPHIC VAGINITIS: ICD-10-CM

## 2023-08-25 DIAGNOSIS — Z00.00 ENCOUNTER FOR GENERAL ADULT MEDICAL EXAMINATION WITHOUT ABNORMAL FINDINGS: ICD-10-CM

## 2023-08-25 DIAGNOSIS — R10.9 UNSPECIFIED ABDOMINAL PAIN: ICD-10-CM

## 2023-08-25 DIAGNOSIS — R10.2 PELVIC AND PERINEAL PAIN: ICD-10-CM

## 2023-08-28 NOTE — SBIRT NOTE ADULT - NSSBIRTDRINKAM_GEN_A_CORE
"Outpatient Physical Therapy Peds Treatment Note     Today's Visit Information:    Patient Name: Tre Nath   : 2018   MRN: [id number]        Visit Date: 2023     Visit Diagnosis:   (F82) Developmental delay, gross motor    (R53.1) Decreased strength    (Z74.09) Impaired functional mobility, balance, gait, and endurance    (R53.1) Generalized weakness    (Q90.9) Down syndrome    (R29.3) Posture abnormality       Subjective: Pt was accompanied to today's session by her father, who reports  they have been having trouble with pt not wanting to drink any liquids which is what is keeping her from getting rid of G tube .    Objective:    Therapeutic Activity:  Patient performed:  Tailor sitting with assistance to attain this position and cues to maintain it with intermittent cueing for upright posture and to promote more anterior pelvic tilt with reaching in sagittal and frontal planes outside base of support with intermittent assistance    Side sit bilaterally with weightbearing through ipsilateral UE and crossing midline to reach for object   Not today- Sitting to/from quadruped position transitions with mod-max A and cues for sequencing and UE/LE placement but facilitation for initiation of transition through bilateral UE (2 reps with min A today but on to elbows)  Not today- Short to/from tall kneel with UE support on elevated surface in front of her with intermittent min A but with independence several repetitions today and only motivational cueing   Not today- Tall kneeling with unilateral-bilateral UE support on table in front of her with cues for UE placement, to prevent wide base of support, and to promote more upright positioning through abdominals and glutes for increased activation (also attempting to perform with minimal to no UE support); also performing pull to tall kneel with intermittent min A today; also working in tall kneel to \"walk\" knees in to surface to bring them more under her " for transitions   Not today- Tall kneel at theraball today to improve challenge and abdominal/glute activation with min A   Not today- Tall kneel to/from quadruped transitions with mod-max A and cueing for improved weightbearing through bilateral UE     Not today- Quadruped sustained position on level ground today with intermittent cueing at abdomen for up to 20 seconds at a time before resting; also modified on elevated surface    Trunk rotation when in tailor and 90/90 sitting with cues and assistance to complete successfully bilaterally, as well as reaching across midline with each UE with this   90/90 sitting with weightbearing through bilateral feet (on purple stool today with no back support), with intermittent reaching anteriorly to improve abdominal activation and increase weightbearing through feet; also reaching to floor from here and with intermittent trunk rotation bilaterally   Sit to/from stands with min-max A and cues to maintain feet in place, as well as support given at trunk to promote proper weight shift facilitation; intermittent activation of quads/glutes from pt to increase knee extension and more upright posture (cues to promote upright posture with more trunk extension today with pt intermittently following these cues-still wanting to be in forward flexed posture often)  Not today- Prone on level ground with cueing and assistance to attain upright head extension and with minimal weightbearing through bilateral UE/forearms and some intermittent reaching with facilitated weightbearing through opposite extremity to allow improved reaching; cueing to prevent compensations of unilateral hip flexion and trunk rotation ; with intermittent hand over hand reaching today as well with each UE; 2 repetitions of pushing into prone on extended arms for a few seconds as well; cues for glute activation with reaching as well     Passive marching performed and minimal AA marching in 90/90 position and  supported standing to improve weightbearing tolerance  Not today- In reclined position, bringing LE into 90/90 position and performing pushing unilaterally and bilaterally against resistance; also in sitting with no LE support today    Clapping and drumming using bilateral UE focusing on bringing hands to midline and good positioning, as well as intermittent trunk rotation with these activities  Not today- Sliding down slide with max A and cues to improve abdominal activation (pt more tolerant with this today after multiple reps but still hesitant with being on elevated surface)  Not today- Prone on scooter board in prone on elbows with perturbations given in various directions and focus on maintaining head in upright position with UE weightbearing    Prone over wedge with focus on improving weightbearing through bilateral UE on extended arms while trunk and lower body are supported and with focus on improving prone extension with engagement; pushing into prone extension multiple reps with cues; also crawling forward off of this surface with max A and cueing for sequencing and UE/LE placement    Modified sit ups with facilitation through some elbow use to push up using unilateral elbow but on bilateral sides and to activate abdominal muscles bilaterally (on wedge today)  Standing with min-mod A and cues at quads/glutes/abductors to promote more upright posture; standing with and without body weight support in Lite Gait (not today), also performing rocking and marching passively; mini squats to full upright standing with cueing this session as well; working on upright standing with improved knee and hip extension while reaching slightly overhead  Not today- Prone over therapist's arms in a carry position working on pt maintaining upright head position against perturbations and movements in various directions   Scooting in sitting position in forward, reverse, and rotational directions bilaterally, as well as on/off  small 1-2 inch mat surface change  Not today- Reciprocal facilitated crawling in quadruped x 5 steps with mod-max A and sequencing cues and then working on transitioning into sitting to cube chair by pulling up and pivoting   Standing with bilateral UE support today up to 30 seconds with min A (bouts of increased assistance as well); also working on weight shift in frontal plane (increase emphasis to left side today); 60 seconds with support given at hips/trunk   Not today- Rolling in barrel tunnel with UE above head to work on abdominal activation   Not today- In supported standing in Lite Gait, kicking using each LE with sequencing cues and LE placement assistance and UE support on handrails   In tailor sitting and in supported standing, working on pulling against minimal resistance (also in standing in gait  today working on more extension through knees)  Floor to stand transition through half kneel stance with support at bench with mod-max A and sequencing cueing/LE placements cues   Not today- Pivoting in prone with mod A and UE support      Neuromuscular Reeducation:   Not today- Tailor sitting on therapeutic swing with small perturbations and focusing on abdominal activation, righting reactions, and using protective mechanisms to  maintain sitting balance against perturbations with CGA-min A at all times   Not today-Tailor sitting on scooter board with perturbations in various directions at varying speed to improve sitting balance, abdominal activation, and righting reactions with min-mod A against movement; also while descending and ascending  ramp today as well (performed today on rolling stool-also while exploring environment)  Not today- tailor sitting on donut ball with min-mod A and perturbations given in various directions; intermittent anterior reaching here and cueing to attain prop as pt tries to posteriorly lean on therapist   Not today- Seated on edge of uneven surface (foam on cube) in  90/90 position with feet on floor with intermittent reaching in various directions and bouncing intermittently to improve challenge and promote improved abdominal activation and righting reactions   Ring and tailor sitting on dynadisc surface with CGA-mod A at all times and with cues for improved posture while performing reaching slightly in various directions and trying to engage abdominal muscles (also with some push/pull activity included)  Not today- Seated on bolster surface in straddled sitting with bilateral LE weightbearing performing rocking to improve this weight shift and weightbearing bilaterally, as well as with intermittent bilateral UE weightbearing for support and balance and to increase UE weightbearing (cues to prevent elbow locking); also with intermittent bouts of minimal to no UE support and cues for upright posture with perturbations given to improve core activation, righting reactions, and sitting balance and with intermittent reaching in frontal and sagittal planes; perturbations and bouncing in this position as well today to improve LE strength, stability, and sensory input; also 90/90 on bolster with perturbations given and cueing for upright posture against these (min-mod A)   Not today- Seated edge of surface with no foot or back support, performing reaching in frontal and sagittal places to improve core strength and righting reactions; intermittent min a with reaching   Not today- Sitting and rolling, as well as performing supine to sit, on foam crash pad surface to improve tolerance to movement, challenge sitting balance, and improve vestibular input    Not today- Straddled sitting on ride on toy with focus on maintaining upright position against perturbations given   Not today- 90/90 sitting on Bosu ball surface with bouncing performed and trying to maintain upright posture against these perturbations  Not today- Tailor sitting on Bosu ball with focus on minimal to no UE support and  working on righting reactions against perturbations in various directions   Not today- Long sitting and supine in net swing with focus on posture with improved abdominal activation against perturbations as well as to improve vestibular input through all planes      Not today- Tailor, ring, and straddled sitting on peanut ball with focus on improved upright posture and maintaining this against perturbations given; also performing modified sit ups and supine to sit over bilateral sides with assistance      Therapeutic Exercises:  Not today- Bridges in hooklying with foot support and cueing maintaining 1-6 seconds, up to 21 times today with intermittent rest breaks   Not today- Marching alternating in hooklying with demonstration and verbal cueing only today x 20      Gait Training:  Not today-  In Lite Gait with body support, ambulating up to 10 steps reciprocally at a time intermittently with facilitation at hamstrings, quads, and dorsiflexors for proper gait mechanics and step through with pt using bilateral bars for UE support and being in body weight supported system (also 2 steps at a time today with only minimal assistance guiding Lite Gait)  Not today- In Lite Gait with body support, taking up to 4 steps backwards without assistance   Ambulating up to 30 steps with min-mod A and weight shift facilitation to allow proper swing phase of opposite LE for proper gait pattern with support at upper trunk and/or bilateral UE with standing rest  Breaks intermittently but ambulating a maximum of 40 ft today (x 2 today)-still a lot of seated and standing rests and more forward flexion but improving  Not today- Pushing weighted cart with min-mod A up to 10 ft before resting  Ambulating up to 20 ft at a time before resting in gait  today, as well as working on navigating doorways and obstacles in gait  with intermittent min A; some backwards ambulation and rotation movements to assist with directional  Never cueing    Assessment/Plan:  Pt tolerated today's session well , demonstrating improved motivation for exploration while in gait  today again.  Pt continues to demonstrate overall decreased strength , impaired posture , impaired balance , impaired overall functional mobility , difficulty with developmental milestones/gross motor skills, difficulty with functional transfers  and delayed gross motor skills. Continue to progress as pt tolerates and per plan of care.       Timed:  Manual Therapy:    0     mins  59280;  Therapeutic Exercise:    0     mins  12014;     Neuromuscular Frederick:    10 mins  75999;    Therapeutic Activity:     23     mins  94218;     Gait Training:      15     mins  73553;       Timed Treatment:   48   mins   Total Treatment:     48   mins      Today's treatment provided by:    PT SIGNATURE: Nilesh Cochran PT, DPT 8/28/2023  [place] License #: [id number]    Electronically Signed

## 2023-09-05 ENCOUNTER — APPOINTMENT (OUTPATIENT)
Dept: NEUROLOGY | Facility: CLINIC | Age: 48
End: 2023-09-05

## 2023-09-07 NOTE — HISTORY OF PRESENT ILLNESS
[FreeTextEntry1] : FROM 12/24/20: Interview conducted in [place], with the assistance of the patient's . This is a 45-year-old right-handed woman who was admitted to [facility] during October 1-4, 2020 for sinus tachycardia and bitemporal headache. She has had decreased intensity of headache to 4-5/10 since being discharged from the hospital with amitriptyline 25mg PO QHS. She has also been taking propranolol 10mg PO BID as recommended by the Cardiology consultant for management of sinus tachycardia. 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 PCP: Dr. Erica Torres.  FROM 3/25/21: Interview conducted in [place], with the assistance of the patient's . The patient, now 46, states that she does not regularly have headaches anymore, but can still have a headache with 5/10 intensity in times of stress. Propranolol 10mg PO BID has been switched to atenolol 50mg PO Daily by nephrologist, Dr. Regina Frankel, although the patient says that this doctor is her cardiologist. Soon after switching to atenolol, the patient experienced two episodes on 3/22 - 3/23 of fatigue and headache with vertical double vision for a few minutes each. During one of these episodes, she and her  measured her BP to be 117/57 (HR 60). She stopped taking amitriptyline about 3 weeks ago, but has not noticed any new headaches, anxiety, depression, or insomnia. She is continuing to attend physical therapy sessions twice per week to relieve her lower back and leg pain. She currently does not have pain in her right leg, although she has some in her left hip and left leg at present.  FROM 6/25/21: Interview conducted in [place], with the assistance of the patient's . The patient continues to experience daily headaches at both temples, ranging from 5/10 to 10/10 in intensity. Headaches are triggered by stress and claustrophobia. The patient notes that her propranolol has been switched to atenolol by her PCP, and that she has been started on levothyroxine for low thyroid hormone levels, as well as completed a course of cephalexin to treat a presumed respiratory infections. She has also had a planned procedure for evaluation of the veins in her legs, to treat pain associated with them.  FROM 9/28/21: Interview conducted in [place], with the assistance of the patient's . The patient states that over the past 2-3 weeks, she has been experiencing recurrence of daily headaches at the right posterior head and at both temples, waxing and waning throughout the day, described as hot and sometimes pressure-like, sometimes radiating to the back of her neck. She has been experiencing worsening left knee pain, and now walks with a limp. She is concerned that the medicines she has been prescribed for pain as needed (acetaminophen, ibuprofen, cyclobenzaprine) may be contributing to her headaches. She has only been taking Atenolol 50mg on some days.  FROM 7/19/23: Interview conducted in [place], with the assistance of the patient's . The patient, now 48, continues to experience almost daily headaches, especially at the left forehead and behind the left eye, described as stabbing "like a knife" and sometimes like a lightning strike, lasting a few minutes at a time, but sometimes occurring multiple times a day. the headaches are associated with blurry vision with the left eye, light sensitivity, sound sensitivity, and room-spinning sensation, but not associated with nausea, vomiting, or falls. She continues to take the daily vitamin supplements that I recommended for headache prophylaxis, without adverse effects but also without significant benefit. She was seen in an Emergency Department in [place] on 4/29/23 after she slipped and fell on a wet surface, but she was able to be discharged after she got some pain relief. She also had a left knee meniscal tear and was prescribed Methocarbamol 750mg PO Daily as needed by Orthopedic surgeon, Dr. Michele De La O. She is now no longer requiring this medication, and she is being considered for a surgical repair of the left knee meniscal tear.

## 2023-09-07 NOTE — DATA REVIEWED
[FreeTextEntry1] : CT Head (10/2/20): - No acute intracranial abnormalities - Left middle cranial fossa arachnoid cyst  Labs (10/4/20): - CMP notable for ALT 70 <H> and AST 50 <H> - CBC, Phos normal  EMG/NCV (2/22/21): Normal study of b/l legs

## 2023-09-07 NOTE — PHYSICAL EXAM
[General Appearance - Alert] : alert [General Appearance - In No Acute Distress] : in no acute distress [Oriented To Time, Place, And Person] : oriented to person, place, and time [Impaired Insight] : insight and judgment were intact [Affect] : the affect was normal [Person] : oriented to person [Place] : oriented to place [Time] : oriented to time [Concentration Intact] : normal concentrating ability [Visual Intact] : visual attention was ~T not ~L decreased [Fluency] : fluency intact [Comprehension] : comprehension intact [Cranial Nerves Optic (II)] : visual acuity intact bilaterally,  visual fields full to confrontation, pupils equal round and reactive to light [Cranial Nerves Oculomotor (III)] : extraocular motion intact [Cranial Nerves Trigeminal (V)] : facial sensation intact symmetrically [Cranial Nerves Facial (VII)] : face symmetrical [Cranial Nerves Vestibulocochlear (VIII)] : hearing was intact bilaterally [Cranial Nerves Glossopharyngeal (IX)] : tongue and palate midline [Cranial Nerves Accessory (XI - Cranial And Spinal)] : head turning and shoulder shrug symmetric [Cranial Nerves Hypoglossal (XII)] : there was no tongue deviation with protrusion [Motor Tone] : muscle tone was normal in all four extremities [Motor Strength] : muscle strength was normal in all four extremities [No Muscle Atrophy] : normal bulk in all four extremities [Motor Handedness Right-Handed] : the patient is right hand dominant [4] : knee flexion 4/5 [3] : knee extension 3/5 [5] : ankle plantar flexion 5/5 [Sensation Tactile Decrease] : light touch was intact [Sensation Pain / Temperature Decrease] : pain and temperature was intact [Balance] : balance was intact [2+] : Ankle jerk left 2+ [Sclera] : the sclera and conjunctiva were normal [PERRL With Normal Accommodation] : pupils were equal in size, round, reactive to light, with normal accommodation [Extraocular Movements] : extraocular movements were intact [Outer Ear] : the ears and nose were normal in appearance [Oropharynx] : the oropharynx was normal [Neck Appearance] : the appearance of the neck was normal [Auscultation Breath Sounds / Voice Sounds] : lungs were clear to auscultation bilaterally [Heart Rate And Rhythm] : heart rate was normal and rhythm regular [Heart Sounds] : normal S1 and S2 [Arterial Pulses Carotid] : carotid pulses were normal with no bruits [Full Pulse] : the pedal pulses are present [Edema] : there was no peripheral edema [Abdomen Soft] : soft [Abdomen Tenderness] : non-tender [No CVA Tenderness] : no ~M costovertebral angle tenderness [No Spinal Tenderness] : no spinal tenderness [Abnormal Walk] : normal gait [Skin Color & Pigmentation] : normal skin color and pigmentation [Skin Turgor] : normal skin turgor [] : no rash [Paresis Pronator Drift Right-Sided] : no pronator drift on the right [Motor Strength Upper Extremities Bilaterally] : strength was normal in both upper extremities [Romberg's Sign] : Romberg's sign was negtive [Past-pointing] : there was no past-pointing [Tremor] : no tremor present [Dysdiadochokinesia Bilaterally] : not present [Coordination - Dysmetria Impaired Finger-to-Nose Bilateral] : not present [Coordination - Dysmetria Impaired Heel-to-Shin Bilateral] : not present [Plantar Reflex Right Only] : normal on the right [Plantar Reflex Left Only] : normal on the left [___] : absent on the right [___] : absent on the left [FreeTextEntry8] : Normal, narrow-based gait. No difficulty with tiptoe, heel, and tandem gaits. [FreeTextEntry1] : Left knee swelling

## 2023-09-07 NOTE — ASSESSMENT
[FreeTextEntry1] : 48 RHF with temporal headaches in setting of stress and hypertension, currently not on antihypertensive medications, also with lower back and bilateral hip pain, as well as worsening left knee pain aggravated by left knee meniscal tear, impairing ambulation.

## 2023-09-11 ENCOUNTER — APPOINTMENT (OUTPATIENT)
Dept: PAIN MANAGEMENT | Facility: CLINIC | Age: 48
End: 2023-09-11
Payer: MEDICAID

## 2023-09-11 PROCEDURE — 62323 NJX INTERLAMINAR LMBR/SAC: CPT

## 2023-09-13 DIAGNOSIS — R92.2 INCONCLUSIVE MAMMOGRAM: ICD-10-CM

## 2023-09-14 ENCOUNTER — OUTPATIENT (OUTPATIENT)
Dept: OUTPATIENT SERVICES | Facility: HOSPITAL | Age: 48
LOS: 1 days | End: 2023-09-14
Payer: MEDICAID

## 2023-09-14 ENCOUNTER — RESULT REVIEW (OUTPATIENT)
Age: 48
End: 2023-09-14

## 2023-09-14 ENCOUNTER — APPOINTMENT (OUTPATIENT)
Dept: ULTRASOUND IMAGING | Facility: IMAGING CENTER | Age: 48
End: 2023-09-14
Payer: MEDICAID

## 2023-09-14 ENCOUNTER — APPOINTMENT (OUTPATIENT)
Dept: MAMMOGRAPHY | Facility: IMAGING CENTER | Age: 48
End: 2023-09-14

## 2023-09-14 DIAGNOSIS — R10.2 PELVIC AND PERINEAL PAIN: ICD-10-CM

## 2023-09-14 DIAGNOSIS — Z00.8 ENCOUNTER FOR OTHER GENERAL EXAMINATION: ICD-10-CM

## 2023-09-14 DIAGNOSIS — Z90.49 ACQUIRED ABSENCE OF OTHER SPECIFIED PARTS OF DIGESTIVE TRACT: Chronic | ICD-10-CM

## 2023-09-14 PROCEDURE — 76856 US EXAM PELVIC COMPLETE: CPT | Mod: 26

## 2023-09-14 PROCEDURE — 76856 US EXAM PELVIC COMPLETE: CPT

## 2023-09-14 PROCEDURE — 77067 SCR MAMMO BI INCL CAD: CPT

## 2023-09-14 PROCEDURE — 76830 TRANSVAGINAL US NON-OB: CPT

## 2023-09-14 PROCEDURE — 77063 BREAST TOMOSYNTHESIS BI: CPT

## 2023-09-14 PROCEDURE — 77067 SCR MAMMO BI INCL CAD: CPT | Mod: 26

## 2023-09-14 PROCEDURE — 77063 BREAST TOMOSYNTHESIS BI: CPT | Mod: 26

## 2023-09-14 PROCEDURE — 76830 TRANSVAGINAL US NON-OB: CPT | Mod: 26

## 2023-09-19 DIAGNOSIS — N83.201 UNSPECIFIED OVARIAN CYST, RIGHT SIDE: ICD-10-CM

## 2023-09-25 ENCOUNTER — APPOINTMENT (OUTPATIENT)
Dept: PULMONOLOGY | Facility: CLINIC | Age: 48
End: 2023-09-25
Payer: MEDICAID

## 2023-09-25 VITALS
HEART RATE: 79 BPM | WEIGHT: 283 LBS | HEIGHT: 66 IN | SYSTOLIC BLOOD PRESSURE: 127 MMHG | BODY MASS INDEX: 45.48 KG/M2 | OXYGEN SATURATION: 98 % | DIASTOLIC BLOOD PRESSURE: 76 MMHG | TEMPERATURE: 97.7 F

## 2023-09-25 DIAGNOSIS — K21.9 GASTRO-ESOPHAGEAL REFLUX DISEASE W/OUT ESOPHAGITIS: ICD-10-CM

## 2023-09-25 DIAGNOSIS — R06.83 SNORING: ICD-10-CM

## 2023-09-25 DIAGNOSIS — E66.01 MORBID (SEVERE) OBESITY DUE TO EXCESS CALORIES: ICD-10-CM

## 2023-09-25 PROCEDURE — 99214 OFFICE O/P EST MOD 30 MIN: CPT

## 2023-09-26 ENCOUNTER — APPOINTMENT (OUTPATIENT)
Dept: ORTHOPEDIC SURGERY | Facility: CLINIC | Age: 48
End: 2023-09-26

## 2023-09-29 NOTE — ED PROVIDER NOTE - ATTENDING CONTRIBUTION TO CARE
patient saw kidney doctor Dr Nguyen  And is being admitted to [facility] today patient wanted Dr Hogan to be aware. Contact number for patient is [phone]   I performed a face to face evaluation of this patient and performed a full history and physical examination on the patient.  I agree with the resident's history, physical examination, and plan of the patient.  45F w/ chronic abd pain characterized as constant, burning, with ttp epigastrium and RUQ, will trial GI cocktail, suspect gastritis, will check labs, rule out pregnancy, eval for biliary etiology, nondistended, does not appear obstructed, will reassess after labs/ US. Belly with mild right upper quadrant ttp, no Saleem's, heart and lung wnl, no cvat, for labs,meds, US and reassess

## 2023-10-02 ENCOUNTER — APPOINTMENT (OUTPATIENT)
Dept: PAIN MANAGEMENT | Facility: CLINIC | Age: 48
End: 2023-10-02
Payer: MEDICAID

## 2023-10-02 VITALS — BODY MASS INDEX: 45.8 KG/M2 | WEIGHT: 285 LBS | HEIGHT: 66 IN

## 2023-10-02 PROCEDURE — 99214 OFFICE O/P EST MOD 30 MIN: CPT

## 2023-10-09 ENCOUNTER — APPOINTMENT (OUTPATIENT)
Dept: PAIN MANAGEMENT | Facility: CLINIC | Age: 48
End: 2023-10-09

## 2023-10-17 ENCOUNTER — APPOINTMENT (OUTPATIENT)
Dept: PAIN MANAGEMENT | Facility: CLINIC | Age: 48
End: 2023-10-17
Payer: MEDICAID

## 2023-10-17 PROCEDURE — 62323 NJX INTERLAMINAR LMBR/SAC: CPT

## 2023-10-23 ENCOUNTER — APPOINTMENT (OUTPATIENT)
Dept: PAIN MANAGEMENT | Facility: CLINIC | Age: 48
End: 2023-10-23

## 2023-10-30 ENCOUNTER — APPOINTMENT (OUTPATIENT)
Dept: PAIN MANAGEMENT | Facility: CLINIC | Age: 48
End: 2023-10-30
Payer: MEDICAID

## 2023-10-30 VITALS — WEIGHT: 285 LBS | HEIGHT: 66 IN | BODY MASS INDEX: 45.8 KG/M2

## 2023-10-30 DIAGNOSIS — M51.26 OTHER INTERVERTEBRAL DISC DISPLACEMENT, LUMBAR REGION: ICD-10-CM

## 2023-10-30 PROCEDURE — 99213 OFFICE O/P EST LOW 20 MIN: CPT

## 2023-10-30 RX ORDER — GABAPENTIN 300 MG/1
300 CAPSULE ORAL
Qty: 60 | Refills: 2 | Status: ACTIVE | COMMUNITY
Start: 2023-10-30 | End: 1900-01-01

## 2023-10-30 RX ORDER — MELOXICAM 15 MG/1
15 TABLET ORAL
Qty: 30 | Refills: 1 | Status: ACTIVE | COMMUNITY
Start: 2023-10-30 | End: 1900-01-01

## 2023-11-02 ENCOUNTER — APPOINTMENT (OUTPATIENT)
Dept: NEUROLOGY | Facility: CLINIC | Age: 48
End: 2023-11-02
Payer: MEDICAID

## 2023-11-02 VITALS
HEART RATE: 86 BPM | SYSTOLIC BLOOD PRESSURE: 135 MMHG | WEIGHT: 290 LBS | HEIGHT: 66 IN | BODY MASS INDEX: 46.61 KG/M2 | DIASTOLIC BLOOD PRESSURE: 93 MMHG

## 2023-11-02 DIAGNOSIS — M25.551 PAIN IN RIGHT HIP: ICD-10-CM

## 2023-11-02 DIAGNOSIS — M51.36 OTHER INTERVERTEBRAL DISC DEGENERATION, LUMBAR REGION: ICD-10-CM

## 2023-11-02 DIAGNOSIS — M25.552 PAIN IN RIGHT HIP: ICD-10-CM

## 2023-11-02 PROCEDURE — 99215 OFFICE O/P EST HI 40 MIN: CPT

## 2023-11-02 RX ORDER — METHYLPREDNISOLONE 4 MG/1
4 TABLET ORAL
Qty: 1 | Refills: 0 | Status: DISCONTINUED | COMMUNITY
Start: 2023-07-19 | End: 2023-11-02

## 2023-11-10 ENCOUNTER — APPOINTMENT (OUTPATIENT)
Dept: ORTHOPEDIC SURGERY | Facility: CLINIC | Age: 48
End: 2023-11-10
Payer: MEDICAID

## 2023-11-10 DIAGNOSIS — S83.242A OTHER TEAR OF MEDIAL MENISCUS, CURRENT INJURY, LEFT KNEE, INITIAL ENCOUNTER: ICD-10-CM

## 2023-11-10 PROCEDURE — 99214 OFFICE O/P EST MOD 30 MIN: CPT

## 2023-11-14 ENCOUNTER — OUTPATIENT (OUTPATIENT)
Dept: OUTPATIENT SERVICES | Facility: HOSPITAL | Age: 48
LOS: 1 days | End: 2023-11-14
Payer: MEDICAID

## 2023-11-14 ENCOUNTER — RESULT REVIEW (OUTPATIENT)
Age: 48
End: 2023-11-14

## 2023-11-14 ENCOUNTER — APPOINTMENT (OUTPATIENT)
Dept: MAMMOGRAPHY | Facility: IMAGING CENTER | Age: 48
End: 2023-11-14
Payer: MEDICAID

## 2023-11-14 ENCOUNTER — APPOINTMENT (OUTPATIENT)
Dept: ULTRASOUND IMAGING | Facility: IMAGING CENTER | Age: 48
End: 2023-11-14
Payer: MEDICAID

## 2023-11-14 DIAGNOSIS — Z00.8 ENCOUNTER FOR OTHER GENERAL EXAMINATION: ICD-10-CM

## 2023-11-14 DIAGNOSIS — Z90.49 ACQUIRED ABSENCE OF OTHER SPECIFIED PARTS OF DIGESTIVE TRACT: Chronic | ICD-10-CM

## 2023-11-14 PROCEDURE — 76641 ULTRASOUND BREAST COMPLETE: CPT

## 2023-11-14 PROCEDURE — 76641 ULTRASOUND BREAST COMPLETE: CPT | Mod: 26,50

## 2023-12-15 PROBLEM — M25.551 HIP PAIN, BILATERAL: Status: ACTIVE | Noted: 2021-03-25

## 2023-12-15 PROBLEM — M51.36 DEGENERATIVE LUMBAR DISC: Status: ACTIVE | Noted: 2023-05-09

## 2023-12-15 NOTE — ASSESSMENT
[FreeTextEntry1] : 48 RHF with temporal headaches in setting of stress and hypertension, currently not on antihypertensive medications, also with lower back and bilateral hip pain

## 2023-12-15 NOTE — HISTORY OF PRESENT ILLNESS
[FreeTextEntry1] : FROM 12/24/20: Interview conducted in Ch[place], with the assistance of the patient's . This is a 45-year-old right-handed woman who was admitted to [facility] during October 1-4, 2020 for sinus tachycardia and bitemporal headache. She has had decreased intensity of headache to 4-5/10 since being discharged from the hospital with amitriptyline 25mg PO QHS. She has also been taking propranolol 10mg PO BID as recommended by the Cardiology consultant for management of sinus tachycardia. 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 PCP: Dr. Erica Torres.  FROM 3/25/21: Interview conducted in [place], with the assistance of the patient's . The patient, now 46, states that she does not regularly have headaches anymore, but can still have a headache with 5/10 intensity in times of stress. Propranolol 10mg PO BID has been switched to atenolol 50mg PO Daily by nephrologist, Dr. Regina Frankel, although the patient says that this doctor is her cardiologist. Soon after switching to atenolol, the patient experienced two episodes on 3/22 - 3/23 of fatigue and headache with vertical double vision for a few minutes each. During one of these episodes, she and her  measured her BP to be 117/57 (HR 60). She stopped taking amitriptyline about 3 weeks ago, but has not noticed any new headaches, anxiety, depression, or insomnia. She is continuing to attend physical therapy sessions twice per week to relieve her lower back and leg pain. She currently does not have pain in her right leg, although she has some in her left hip and left leg at present.  FROM 6/25/21: Interview conducted in [place], with the assistance of the patient's . The patient continues to experience daily headaches at both temples, ranging from 5/10 to 10/10 in intensity. Headaches are triggered by stress and claustrophobia. The patient notes that her propranolol has been switched to atenolol by her PCP, and that she has been started on levothyroxine for low thyroid hormone levels, as well as completed a course of cephalexin to treat a presumed respiratory infections. She has also had a planned procedure for evaluation of the veins in her legs, to treat pain associated with them.  FROM 9/28/21: Interview conducted in [place], with the assistance of the patient's . The patient states that over the past 2-3 weeks, she has been experiencing recurrence of daily headaches at the right posterior head and at both temples, waxing and waning throughout the day, described as hot and sometimes pressure-like, sometimes radiating to the back of her neck. She has been experiencing worsening left knee pain, and now walks with a limp. She is concerned that the medicines she has been prescribed for pain as needed (acetaminophen, ibuprofen, cyclobenzaprine) may be contributing to her headaches. She has only been taking Atenolol 50mg on some days.  FROM 7/19/23: Interview conducted in [place], with the assistance of the patient's . The patient, now 48, continues to experience almost daily headaches, especially at the left forehead and behind the left eye, described as stabbing "like a knife" and sometimes like a lightning strike, lasting a few minutes at a time, but sometimes occurring multiple times a day. the headaches are associated with blurry vision with the left eye, light sensitivity, sound sensitivity, and room-spinning sensation, but not associated with nausea, vomiting, or falls. She continues to take the daily vitamin supplements that I recommended for headache prophylaxis, without adverse effects but also without significant benefit. She was seen in an Emergency Department in [place] on 4/29/23 after she slipped and fell on a wet surface, but she was able to be discharged after she got some pain relief. She also had a left knee meniscal tear and was prescribed Methocarbamol 750mg PO Daily as needed by Orthopedic surgeon, Dr. Michele De La O. She is now no longer requiring this medication, and she is being considered for a surgical repair of the left knee meniscal tear.  FROM 11/2/23: Interview conducted in [place], with the assistance of the patient's . Since I last saw the patient on 7/19/23, she continues to have daily episodes of stabbing pain and throbbing in both eyes and bilateral eyelid twitching, more often on the left. Headaches are aggravated by stress. She also experiences persistent pain in the throat, that feels like "inflammation." She is taking the daily vitamin supplements I recommended, in addition to the new medication Topiramate 50mg, but only once daily and not twice daily. She sometimes feels off balance, but has not had any falls, nor any drowsiness or changes in appetite. She underwent two lumbar epidural steroid injections to manage lower back pain, and has noticed possible side effects, including palpitations and hot sensation in the head, especially at night.

## 2023-12-15 NOTE — PHYSICAL EXAM
[FreeTextEntry1] : Left knee swelling [Paresis Pronator Drift Right-Sided] : no pronator drift on the right [Motor Strength Upper Extremities Bilaterally] : strength was normal in both upper extremities [Romberg's Sign] : Romberg's sign was negtive [Past-pointing] : there was no past-pointing [Tremor] : no tremor present [Dysdiadochokinesia Bilaterally] : not present [Coordination - Dysmetria Impaired Finger-to-Nose Bilateral] : not present [Coordination - Dysmetria Impaired Heel-to-Shin Bilateral] : not present [Plantar Reflex Right Only] : normal on the right [Plantar Reflex Left Only] : normal on the left [___] : absent on the right [___] : absent on the left [FreeTextEntry8] : Normal, narrow-based gait. No difficulty with tiptoe, heel, and tandem gaits.

## 2023-12-22 ENCOUNTER — APPOINTMENT (OUTPATIENT)
Dept: ORTHOPEDIC SURGERY | Facility: CLINIC | Age: 48
End: 2023-12-22
Payer: MEDICAID

## 2023-12-22 PROCEDURE — 99214 OFFICE O/P EST MOD 30 MIN: CPT | Mod: 57

## 2023-12-28 NOTE — REASON FOR VISIT
[Interpreters_IDNumber] : [id number] [Interpreters_FullName] : Rebecca [Ad Hoc ] : provided by an ad hoc  [FreeTextEntry2] : Follow Up: left knee [TWNoteComboBox1] : Togolese

## 2023-12-28 NOTE — IMAGING
[de-identified] : The patient is a well appearing 48 year old female of their stated age. Patient ambulates with a cane Negative straight leg raise bilateral  BILATERAL KNEES:         	 ROM:  0-130 degrees, area hyperpigmentation anterior left knee patella tendon  Lachman: Negative Pivot Shift: Negative Anterior Drawer: Negative Posterior Drawer / Sag: Negative Varus Stress 0 degrees: Stable Varus Stress 30 degrees: Stable Valgus Stress 0 degrees: Stable Valgus Stress 30 degrees: Stable Medial Fransico: Positive Lateral Fransico: Negative Patella Glide: 2+ Patella Apprehension: Negative Patella Grind: Negative  Palpation: Medial Joint Line: TTP Lateral Joint Line: Nontender Medial Collateral Ligament: Nontender Lateral Collateral Ligament/PLC: Nontender Distal Femur: Nontender Proximal Tibia: Nontender Tibial Tubercle: Nontender Distal Pole Patella: Nontender Quadriceps Tendon: Nontender &  Intact Patella Tendon: Nontender &  Intact Medial Distal Hamstring/PES: Nontender Lateral Distal Hamstring: Nontender & Stable Iliotibial Band: Nontender Medial Patellofemoral Ligament: Nontender Adductor: Nontender Proximal GSC-Plantaris: Nontender Calf: Supple & Nontender  Inspection: Deformity: No Erythema: No Ecchymosis: No Abrasions: No Effusion: Moderate Prepatellar Bursitis: No  Neurologic Exam: Sensation L4-S1: Grossly Intact  Motor Exam: Quadriceps: 5 out of 5 Hamstrings: 5 out of 5 EHL: 5 out of 5 FHL: 5 out of 5 TA: 5 out of 5 GS: 5 out of 5  Circulatory/Pulses: Dorsalis Pedis: 2+ Posterior Tibialis: 2+  Additional Pertinent Findings: None  Contralateral Knee:               	 ROM: 0-130 degrees  Other Pertinent Findings: None

## 2023-12-28 NOTE — HISTORY OF PRESENT ILLNESS
[5] : 5 [Dull/Aching] : dull/aching [Shooting] : shooting [Tingling] : tingling [Intermittent] : intermittent [Meds] : meds [Standing] : standing [Walking] : walking [Stairs] : stairs [] : yes [de-identified] : 8/11/23: Patient is a 47yo F presenting for evaluation of b/l knee pain, L>R. Saw Dr. De La O, has been doing PT and complains pain has worsened since beginning Pt. Not working. She has been using cane to ambulate since 4/29 after she fell. Reports clicking and popping.  PMH: pre-diabetic   11/10/23: She returns with her spouse with continued pain L>R. She reports she developed itching, redness and swelling at injection site on the L. Overall her pain has increased.  She is using a cane. Patient was previously attending PT and stopped because she was experiencing severe pain in her lower back and knees. Patient states pain in left knee is worse when lying down  12/22/23: F/U left knee; Patient states PT is not helping. PT 2x a week. After PT, pain increases. Pain has stayed the same since last visit. Accompanied by her spouse.   [FreeTextEntry1] : left knee  [FreeTextEntry5] : 11/12/2023: Pt is here today to follow up on bilateral knee pain. Pt reports that she is still in pain. Pt states that she is unable to do PT because of the pain she is in. Pt also reports that the injections she has received did not help and she had a negative reaction to it.  [FreeTextEntry6] : numbness  [FreeTextEntry7] : bilateral knee to hip  [de-identified] : 07/13/23 [de-identified] : Jaylyn  [de-identified] : xrays

## 2023-12-28 NOTE — DISCUSSION/SUMMARY
[de-identified] : SERVANDO and STEPHANIA discussed Her issues. Discussed findings of today's exam and possible causes of patient's pain. Educated patient on their most probable diagnosis of mild b/l knee arthritis and b/l MMT, LMT. Reviewed possible courses of treatment, and we collaboratively decided best course of treatment at this time will include:  1. She has not improved with conservative management. 2. Discussed LT knee arthroscopy with PLM/PMM.  Instructions: Dx / Natural History The patient was advised of the diagnosis.  The natural history of the pathology was explained in full to the patient in layman's terms.  Several different treatment options were discussed and explained in full to the patient including the risks and benefits of both surgical and non-surgical treatments.  All questions and concerns were answered.   RICE I explained to the patient that rest, ice, compression, and elevation would benefit them.  They may return to activity after follow-up or when they no longer have any pain.  NSAIDs - OTC Patient is to begin over the counter oral anti-inflammatory medications on an as needed basis, as long as there are no medical contraindications.  Patient is counseled on possible GI and blood pressure side effects.  Pain Guide Activities The patient was advised to let pain guide the gradual advancement of activities.  Icing The patient was advised to apply ice (wrapped in a towel or protective covering) to the area daily (20 minutes at a time, 2-4X/day).  All of the patient's questions were answered to Her satisfaction. Diagnoses and potential treatments were reviewed. She agreed with the plan and would like to move forward with it.  She is indicated for [LT knee arthroscopy with PMM and PLM]. After a lengthy discussion, she would like to proceed with surgery, which is to be scheduled. She understands that there may still be pain after the procedure as there is some arthritis already in the knee. Risks and benefits of surgery discussed with patient. All questions answered.   Follow up post-op

## 2024-01-08 ENCOUNTER — APPOINTMENT (OUTPATIENT)
Dept: PULMONOLOGY | Facility: CLINIC | Age: 49
End: 2024-01-08

## 2024-01-08 ENCOUNTER — APPOINTMENT (OUTPATIENT)
Dept: INTERNAL MEDICINE | Facility: CLINIC | Age: 49
End: 2024-01-08

## 2024-01-08 NOTE — HISTORY OF PRESENT ILLNESS
[FreeTextEntry4] : Pt is here for preop evaluation Surgeon: Dr. Alvarez Date of Surgery: Diagnosis Procedure: No complications from previous annesthesia Pt has no active cardiac conditions no hx of CAD or prior MI no h/o of CHF no h/o of DM, insulin dependent no h/o of CKD on HD Good Functional Satus: Pt is able to walk four blocks or climb two flights of stairs  Patient denies CP, SOB, or palpitations during the last 6 months.

## 2024-01-30 ENCOUNTER — OUTPATIENT (OUTPATIENT)
Dept: OUTPATIENT SERVICES | Facility: HOSPITAL | Age: 49
LOS: 1 days | End: 2024-01-30
Payer: MEDICAID

## 2024-01-30 VITALS
DIASTOLIC BLOOD PRESSURE: 77 MMHG | OXYGEN SATURATION: 97 % | SYSTOLIC BLOOD PRESSURE: 107 MMHG | TEMPERATURE: 98 F | HEART RATE: 101 BPM | RESPIRATION RATE: 15 BRPM | WEIGHT: 285.06 LBS | HEIGHT: 66 IN

## 2024-01-30 DIAGNOSIS — Z01.818 ENCOUNTER FOR OTHER PREPROCEDURAL EXAMINATION: ICD-10-CM

## 2024-01-30 DIAGNOSIS — S83.242D OTHER TEAR OF MEDIAL MENISCUS, CURRENT INJURY, LEFT KNEE, SUBSEQUENT ENCOUNTER: ICD-10-CM

## 2024-01-30 DIAGNOSIS — Z90.49 ACQUIRED ABSENCE OF OTHER SPECIFIED PARTS OF DIGESTIVE TRACT: Chronic | ICD-10-CM

## 2024-01-30 DIAGNOSIS — Z91.89 OTHER SPECIFIED PERSONAL RISK FACTORS, NOT ELSEWHERE CLASSIFIED: ICD-10-CM

## 2024-01-30 DIAGNOSIS — Z87.59 PERSONAL HISTORY OF OTHER COMPLICATIONS OF PREGNANCY, CHILDBIRTH AND THE PUERPERIUM: Chronic | ICD-10-CM

## 2024-01-30 DIAGNOSIS — S83.282D OTHER TEAR OF LATERAL MENISCUS, CURRENT INJURY, LEFT KNEE, SUBSEQUENT ENCOUNTER: ICD-10-CM

## 2024-01-30 DIAGNOSIS — M25.562 PAIN IN LEFT KNEE: ICD-10-CM

## 2024-01-30 LAB
A1C WITH ESTIMATED AVERAGE GLUCOSE RESULT: 6.3 % — HIGH (ref 4–5.6)
ALBUMIN SERPL ELPH-MCNC: 3.8 G/DL — SIGNIFICANT CHANGE UP (ref 3.3–5)
ALP SERPL-CCNC: 94 U/L — SIGNIFICANT CHANGE UP (ref 30–120)
ALT FLD-CCNC: 59 U/L — SIGNIFICANT CHANGE UP (ref 10–60)
ANION GAP SERPL CALC-SCNC: 11 MMOL/L — SIGNIFICANT CHANGE UP (ref 5–17)
AST SERPL-CCNC: 40 U/L — SIGNIFICANT CHANGE UP (ref 10–40)
BILIRUB SERPL-MCNC: 0.2 MG/DL — SIGNIFICANT CHANGE UP (ref 0.2–1.2)
BUN SERPL-MCNC: 18 MG/DL — SIGNIFICANT CHANGE UP (ref 7–23)
CALCIUM SERPL-MCNC: 9.7 MG/DL — SIGNIFICANT CHANGE UP (ref 8.4–10.5)
CHLORIDE SERPL-SCNC: 108 MMOL/L — SIGNIFICANT CHANGE UP (ref 96–108)
CO2 SERPL-SCNC: 22 MMOL/L — SIGNIFICANT CHANGE UP (ref 22–31)
CREAT SERPL-MCNC: 0.83 MG/DL — SIGNIFICANT CHANGE UP (ref 0.5–1.3)
EGFR: 86 ML/MIN/1.73M2 — SIGNIFICANT CHANGE UP
ESTIMATED AVERAGE GLUCOSE: 134 MG/DL — HIGH (ref 68–114)
GLUCOSE SERPL-MCNC: 117 MG/DL — HIGH (ref 70–99)
HCT VFR BLD CALC: 44.8 % — SIGNIFICANT CHANGE UP (ref 34.5–45)
HGB BLD-MCNC: 14.4 G/DL — SIGNIFICANT CHANGE UP (ref 11.5–15.5)
MCHC RBC-ENTMCNC: 29.5 PG — SIGNIFICANT CHANGE UP (ref 27–34)
MCHC RBC-ENTMCNC: 32.1 GM/DL — SIGNIFICANT CHANGE UP (ref 32–36)
MCV RBC AUTO: 91.8 FL — SIGNIFICANT CHANGE UP (ref 80–100)
NRBC # BLD: 0 /100 WBCS — SIGNIFICANT CHANGE UP (ref 0–0)
PLATELET # BLD AUTO: 256 K/UL — SIGNIFICANT CHANGE UP (ref 150–400)
POTASSIUM SERPL-MCNC: 3.9 MMOL/L — SIGNIFICANT CHANGE UP (ref 3.5–5.3)
POTASSIUM SERPL-SCNC: 3.9 MMOL/L — SIGNIFICANT CHANGE UP (ref 3.5–5.3)
PROT SERPL-MCNC: 8.3 G/DL — SIGNIFICANT CHANGE UP (ref 6–8.3)
RBC # BLD: 4.88 M/UL — SIGNIFICANT CHANGE UP (ref 3.8–5.2)
RBC # FLD: 13.5 % — SIGNIFICANT CHANGE UP (ref 10.3–14.5)
SODIUM SERPL-SCNC: 141 MMOL/L — SIGNIFICANT CHANGE UP (ref 135–145)
WBC # BLD: 12.54 K/UL — HIGH (ref 3.8–10.5)
WBC # FLD AUTO: 12.54 K/UL — HIGH (ref 3.8–10.5)

## 2024-01-30 PROCEDURE — 36415 COLL VENOUS BLD VENIPUNCTURE: CPT

## 2024-01-30 PROCEDURE — 80053 COMPREHEN METABOLIC PANEL: CPT

## 2024-01-30 PROCEDURE — G0463: CPT

## 2024-01-30 PROCEDURE — 83036 HEMOGLOBIN GLYCOSYLATED A1C: CPT

## 2024-01-30 PROCEDURE — 85027 COMPLETE CBC AUTOMATED: CPT

## 2024-01-30 PROCEDURE — 93005 ELECTROCARDIOGRAM TRACING: CPT

## 2024-01-30 PROCEDURE — 93010 ELECTROCARDIOGRAM REPORT: CPT

## 2024-01-30 NOTE — H&P PST ADULT - NSICDXFAMILYHX_GEN_ALL_CORE_FT
FAMILY HISTORY:  Father  Still living? No  FH: type 2 diabetes, Age at diagnosis: Age Unknown    Mother  Still living? No  Family history of uterine cancer, Age at diagnosis: Age Unknown    Sibling  Still living? Yes, Estimated age: 31-40  FH: breast cancer, Age at diagnosis: Age Unknown  FH: type 2 diabetes, Age at diagnosis: Age Unknown    Child  Still living? Yes, Estimated age: 21-30  FH: non-Hodgkin's lymphoma, Age at diagnosis: Age Unknown

## 2024-01-30 NOTE — H&P PST ADULT - PROBLEM SELECTOR PLAN 1
left knee arthroscopy partial medial and lateral menisectomy, vs meniscus repair synovectomy, possible loose body removal chondroplasty; preop instructions given; to go for medical clearance

## 2024-01-30 NOTE — H&P PST ADULT - HISTORY OF PRESENT ILLNESS
this is a  47 y/o female who had a fall last yr and has bilateral knee pain and low back pain since; she has more pain left knee than right; doesn't know findings; to have left knee arthroscopy

## 2024-01-30 NOTE — H&P PST ADULT - NSICDXPASTMEDICALHX_GEN_ALL_CORE_FT
PAST MEDICAL HISTORY:  Arachnoid cyst     Asthma     Ectopic pregnancy     Fall     Lumbar herniated disc     Migraine     Obesity     Positive TB test

## 2024-01-31 PROBLEM — M51.26 OTHER INTERVERTEBRAL DISC DISPLACEMENT, LUMBAR REGION: Chronic | Status: ACTIVE | Noted: 2024-01-30

## 2024-01-31 PROBLEM — J45.909 UNSPECIFIED ASTHMA, UNCOMPLICATED: Chronic | Status: ACTIVE | Noted: 2024-01-30

## 2024-01-31 PROBLEM — R76.11 NONSPECIFIC REACTION TO TUBERCULIN SKIN TEST WITHOUT ACTIVE TUBERCULOSIS: Chronic | Status: ACTIVE | Noted: 2024-01-30

## 2024-01-31 PROBLEM — E66.9 OBESITY, UNSPECIFIED: Chronic | Status: ACTIVE | Noted: 2024-01-30

## 2024-01-31 PROBLEM — G93.0 CEREBRAL CYSTS: Chronic | Status: ACTIVE | Noted: 2024-01-30
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
## 2024-01-31 PROBLEM — G43.909 MIGRAINE, UNSPECIFIED, NOT INTRACTABLE, WITHOUT STATUS MIGRAINOSUS: Chronic | Status: ACTIVE | Noted: 2024-01-30

## 2024-02-01 ENCOUNTER — APPOINTMENT (OUTPATIENT)
Dept: INTERNAL MEDICINE | Facility: CLINIC | Age: 49
End: 2024-02-01
Payer: MEDICAID

## 2024-02-01 VITALS
SYSTOLIC BLOOD PRESSURE: 118 MMHG | HEIGHT: 66 IN | DIASTOLIC BLOOD PRESSURE: 79 MMHG | BODY MASS INDEX: 45.32 KG/M2 | TEMPERATURE: 98.4 F | OXYGEN SATURATION: 96 % | HEART RATE: 83 BPM | WEIGHT: 282 LBS

## 2024-02-01 DIAGNOSIS — Z01.818 ENCOUNTER FOR OTHER PREPROCEDURAL EXAMINATION: ICD-10-CM

## 2024-02-01 DIAGNOSIS — Z91.89 OTHER SPECIFIED PERSONAL RISK FACTORS, NOT ELSEWHERE CLASSIFIED: ICD-10-CM

## 2024-02-01 DIAGNOSIS — R79.89 OTHER SPECIFIED ABNORMAL FINDINGS OF BLOOD CHEMISTRY: ICD-10-CM

## 2024-02-01 DIAGNOSIS — M25.562 PAIN IN LEFT KNEE: ICD-10-CM

## 2024-02-01 LAB
HCT VFR BLD CALC: 39.7 %
HGB BLD-MCNC: 13.4 G/DL
MCHC RBC-ENTMCNC: 30.3 PG
MCHC RBC-ENTMCNC: 33.8 GM/DL
MCV RBC AUTO: 89.8 FL
PLATELET # BLD AUTO: 239 K/UL
RBC # BLD: 4.42 M/UL
RBC # FLD: 14 %
WBC # FLD AUTO: 9.78 K/UL

## 2024-02-01 PROCEDURE — 99214 OFFICE O/P EST MOD 30 MIN: CPT

## 2024-02-01 NOTE — PHYSICAL EXAM
[No Acute Distress] : no acute distress [Normal Sclera/Conjunctiva] : normal sclera/conjunctiva [PERRL] : pupils equal round and reactive to light [EOMI] : extraocular movements intact [Normal Oropharynx] : the oropharynx was normal [No Lymphadenopathy] : no lymphadenopathy [No Respiratory Distress] : no respiratory distress  [No Accessory Muscle Use] : no accessory muscle use [Clear to Auscultation] : lungs were clear to auscultation bilaterally [Normal Rate] : normal rate  [Regular Rhythm] : with a regular rhythm [Normal S1, S2] : normal S1 and S2 [No Edema] : there was no peripheral edema [Soft] : abdomen soft [Non Tender] : non-tender [Non-distended] : non-distended [Declined Rectal Exam] : declined rectal exam [Normal Posterior Cervical Nodes] : no posterior cervical lymphadenopathy [No CVA Tenderness] : no CVA  tenderness [No Spinal Tenderness] : no spinal tenderness [Grossly Normal Strength/Tone] : grossly normal strength/tone [No Rash] : no rash [No Focal Deficits] : no focal deficits [Normal Affect] : the affect was normal [Alert and Oriented x3] : oriented to person, place, and time [Normal Insight/Judgement] : insight and judgment were intact [de-identified] : no TTP of chest on palpation  [de-identified] : declined [de-identified] : b/l paraspinal muscle tenderness, no spinal tenderness

## 2024-02-01 NOTE — REVIEW OF SYSTEMS
[Negative] : Heme/Lymph [Palpitations] : palpitations [Chest Pain] : no chest pain [Leg Claudication] : no leg claudication [Lower Ext Edema] : no lower extremity edema [Orthopnea] : no orthopnea [Paroxysmal Nocturnal Dyspnea] : no paroxysmal nocturnal dyspnea [Wheezing] : no wheezing [Cough] : no cough [Dyspnea on Exertion] : no dyspnea on exertion [FreeTextEntry5] : intermittent palpiations/chest pain  [FreeTextEntry6] : risk for sleep apnea [FreeTextEntry9] : Left Knee pain

## 2024-02-01 NOTE — ASSESSMENT
[Patient Requires Further Testing] : Patient requires further testing [FreeTextEntry4] : #Preopclearance c/o of chest pressure/palpitations for past 3 weeks - reocmmend cardio clearance and pulm clearance risk for ROBERT PST elevated WBC - repeat CBC afebrile, no URI sx avoidance of asiprin or nsaids 5-7 days prior to surgery to call with any changes in present status EKG: NSR, no significant issues noted. pt agrees and understands plan via teach back method. all questions answered. A total of 30 minutes including same day pre-visit chart review, face to face time with patient, history taking, physical examination, coordination of care, testing interpretation, and documentation was spent on this visit. PT AGREES AND UNDERSTANDS NEEDS CARDIAC/PULM CLEARANCE BEFORE BEING OPTIMIZED FOR SURGERY DAUGHTER AGREES AS WELL.

## 2024-02-01 NOTE — HISTORY OF PRESENT ILLNESS
[Sleep Apnea] : sleep apnea [(Patient denies any chest pain, claudication, dyspnea on exertion, orthopnea, palpitations or syncope)] : Patient denies any chest pain, claudication, dyspnea on exertion, orthopnea, palpitations or syncope [Moderate (4-6 METs)] : Moderate (4-6 METs) [Aortic Stenosis] : no aortic stenosis [Atrial Fibrillation] : no atrial fibrillation [Coronary Artery Disease] : no coronary artery disease [Recent Myocardial Infarction] : no recent myocardial infarction [Implantable Device/Pacemaker] : no implantable device/pacemaker [Asthma] : no asthma [COPD] : no COPD [Smoker] : not a smoker [Family Member] : no family member with adverse anesthesia reaction/sudden death [Self] : no previous adverse anesthesia reaction [Chronic Anticoagulation] : no chronic anticoagulation [Chronic Kidney Disease] : no chronic kidney disease [FreeTextEntry4] : 48 F here accompanied by daughter  The patient primary language is Hungarian,   was offered to the patient, but she preferred her family member  to interpret for her Pt is here for preop evaluation Surgeon: Dr. Alvarez Date of Surgery: 2/6/2024 Diagnosis Left Knee Pain Procedure: Left kneee Surgery  No complications from previous annesthesia no hx of CAD or prior MI no h/o of CHF no h/o of DM, no insulin dependent no h/o of CKD on HD Good Functional Satus: Pt is able to walk four blocks or climb two flights of stairs - however, due to knee pain affect walking and use a cane.  Patient reports  CP, SOB, or palpitations during the last 3 weeks at rest and movement PST EKG reviewed  no acute chest pain today but reports has been for past 3 weeks.  PST EKG - NSR so significant st or t wave changes recommend clearance from cardio/pulm risk for sleep apnea prior to procedure no acute chest pain today.  pt agrees and understands plan via teach back method. all questions answered. [Diabetes] : no diabetes

## 2024-02-02 ENCOUNTER — APPOINTMENT (OUTPATIENT)
Dept: PULMONOLOGY | Facility: CLINIC | Age: 49
End: 2024-02-02

## 2024-02-02 ENCOUNTER — APPOINTMENT (OUTPATIENT)
Dept: PULMONOLOGY | Facility: CLINIC | Age: 49
End: 2024-02-02
Payer: MEDICAID

## 2024-02-02 ENCOUNTER — APPOINTMENT (OUTPATIENT)
Dept: NEUROLOGY | Facility: CLINIC | Age: 49
End: 2024-02-02
Payer: MEDICAID

## 2024-02-02 ENCOUNTER — NON-APPOINTMENT (OUTPATIENT)
Age: 49
End: 2024-02-02

## 2024-02-02 ENCOUNTER — APPOINTMENT (OUTPATIENT)
Dept: CARDIOLOGY | Facility: CLINIC | Age: 49
End: 2024-02-02
Payer: MEDICAID

## 2024-02-02 VITALS
OXYGEN SATURATION: 98 % | WEIGHT: 284 LBS | BODY MASS INDEX: 45.64 KG/M2 | HEIGHT: 66 IN | HEART RATE: 86 BPM | SYSTOLIC BLOOD PRESSURE: 128 MMHG | DIASTOLIC BLOOD PRESSURE: 80 MMHG

## 2024-02-02 VITALS
WEIGHT: 284 LBS | HEIGHT: 68 IN | HEART RATE: 79 BPM | DIASTOLIC BLOOD PRESSURE: 70 MMHG | BODY MASS INDEX: 43.04 KG/M2 | SYSTOLIC BLOOD PRESSURE: 116 MMHG

## 2024-02-02 DIAGNOSIS — E78.00 PURE HYPERCHOLESTEROLEMIA, UNSPECIFIED: ICD-10-CM

## 2024-02-02 DIAGNOSIS — R07.89 OTHER CHEST PAIN: ICD-10-CM

## 2024-02-02 DIAGNOSIS — S83.282A OTHER TEAR OF LATERAL MENISCUS, CURRENT INJURY, LEFT KNEE, INITIAL ENCOUNTER: ICD-10-CM

## 2024-02-02 DIAGNOSIS — Z01.811 ENCOUNTER FOR PREPROCEDURAL RESPIRATORY EXAMINATION: ICD-10-CM

## 2024-02-02 DIAGNOSIS — M54.16 RADICULOPATHY, LUMBAR REGION: ICD-10-CM

## 2024-02-02 DIAGNOSIS — Z01.810 ENCOUNTER FOR PREPROCEDURAL CARDIOVASCULAR EXAMINATION: ICD-10-CM

## 2024-02-02 DIAGNOSIS — R05.9 COUGH, UNSPECIFIED: ICD-10-CM

## 2024-02-02 DIAGNOSIS — R51.9 HEADACHE, UNSPECIFIED: ICD-10-CM

## 2024-02-02 DIAGNOSIS — R00.2 PALPITATIONS: ICD-10-CM

## 2024-02-02 PROCEDURE — 94727 GAS DIL/WSHOT DETER LNG VOL: CPT

## 2024-02-02 PROCEDURE — 99214 OFFICE O/P EST MOD 30 MIN: CPT | Mod: 25

## 2024-02-02 PROCEDURE — 88738 HGB QUANT TRANSCUTANEOUS: CPT

## 2024-02-02 PROCEDURE — 99204 OFFICE O/P NEW MOD 45 MIN: CPT | Mod: 25

## 2024-02-02 PROCEDURE — 94729 DIFFUSING CAPACITY: CPT

## 2024-02-02 PROCEDURE — ZZZZZ: CPT

## 2024-02-02 PROCEDURE — 94060 EVALUATION OF WHEEZING: CPT

## 2024-02-02 PROCEDURE — 95012 NITRIC OXIDE EXP GAS DETER: CPT

## 2024-02-02 PROCEDURE — 93000 ELECTROCARDIOGRAM COMPLETE: CPT

## 2024-02-02 PROCEDURE — 99215 OFFICE O/P EST HI 40 MIN: CPT

## 2024-02-02 RX ORDER — ROSUVASTATIN CALCIUM 5 MG/1
5 TABLET, FILM COATED ORAL
Qty: 90 | Refills: 0 | Status: DISCONTINUED | COMMUNITY
Start: 2023-07-20 | End: 2024-02-02

## 2024-02-02 RX ORDER — ATORVASTATIN CALCIUM 10 MG/1
10 TABLET, FILM COATED ORAL
Qty: 90 | Refills: 3 | Status: DISCONTINUED | COMMUNITY
Start: 2023-09-25 | End: 2024-02-02

## 2024-02-02 RX ORDER — TOPIRAMATE 50 MG/1
50 TABLET, FILM COATED ORAL
Qty: 360 | Refills: 1 | Status: ACTIVE | COMMUNITY
Start: 2023-07-19 | End: 1900-01-01

## 2024-02-02 NOTE — PHYSICAL EXAM
[General Appearance - Alert] : alert [General Appearance - In No Acute Distress] : in no acute distress [Oriented To Time, Place, And Person] : oriented to person, place, and time [Impaired Insight] : insight and judgment were intact [Affect] : the affect was normal [Person] : oriented to person [Place] : oriented to place [Time] : oriented to time [Concentration Intact] : normal concentrating ability [Visual Intact] : visual attention was ~T not ~L decreased [Fluency] : fluency intact [Comprehension] : comprehension intact [Cranial Nerves Optic (II)] : visual acuity intact bilaterally,  visual fields full to confrontation, pupils equal round and reactive to light [Cranial Nerves Oculomotor (III)] : extraocular motion intact [Cranial Nerves Trigeminal (V)] : facial sensation intact symmetrically [Cranial Nerves Facial (VII)] : face symmetrical [Cranial Nerves Vestibulocochlear (VIII)] : hearing was intact bilaterally [Cranial Nerves Glossopharyngeal (IX)] : tongue and palate midline [Cranial Nerves Accessory (XI - Cranial And Spinal)] : head turning and shoulder shrug symmetric [Cranial Nerves Hypoglossal (XII)] : there was no tongue deviation with protrusion [Motor Tone] : muscle tone was normal in all four extremities [Motor Strength] : muscle strength was normal in all four extremities [No Muscle Atrophy] : normal bulk in all four extremities [Motor Handedness Right-Handed] : the patient is right hand dominant [4] : knee flexion 4/5 [3] : knee extension 3/5 [5] : ankle plantar flexion 5/5 [Sensation Tactile Decrease] : light touch was intact [Sensation Pain / Temperature Decrease] : pain and temperature was intact [Balance] : balance was intact [2+] : Ankle jerk left 2+ [Sclera] : the sclera and conjunctiva were normal [PERRL With Normal Accommodation] : pupils were equal in size, round, reactive to light, with normal accommodation [Extraocular Movements] : extraocular movements were intact [Outer Ear] : the ears and nose were normal in appearance [Oropharynx] : the oropharynx was normal [Neck Appearance] : the appearance of the neck was normal [Auscultation Breath Sounds / Voice Sounds] : lungs were clear to auscultation bilaterally [Heart Rate And Rhythm] : heart rate was normal and rhythm regular [Heart Sounds] : normal S1 and S2 [Arterial Pulses Carotid] : carotid pulses were normal with no bruits [Full Pulse] : the pedal pulses are present [Edema] : there was no peripheral edema [Abdomen Soft] : soft [Abdomen Tenderness] : non-tender [No CVA Tenderness] : no ~M costovertebral angle tenderness [No Spinal Tenderness] : no spinal tenderness [Abnormal Walk] : normal gait [Skin Color & Pigmentation] : normal skin color and pigmentation [Skin Turgor] : normal skin turgor [] : no rash [Paresis Pronator Drift Right-Sided] : no pronator drift on the right [Motor Strength Upper Extremities Bilaterally] : strength was normal in both upper extremities [Romberg's Sign] : Romberg's sign was negtive [Past-pointing] : there was no past-pointing [Tremor] : no tremor present [Dysdiadochokinesia Bilaterally] : not present [Coordination - Dysmetria Impaired Finger-to-Nose Bilateral] : not present [Coordination - Dysmetria Impaired Heel-to-Shin Bilateral] : not present [Plantar Reflex Right Only] : normal on the right [Plantar Reflex Left Only] : normal on the left [___] : absent on the right [___] : absent on the left [FreeTextEntry8] : Antalgic, narrow-based gait. Cautious tandem gait without loss of balance. Unable to perform tiptoe or heel gait due due to left knee pain.

## 2024-02-02 NOTE — HISTORY OF PRESENT ILLNESS
[FreeTextEntry1] : FROM 12/24/20: Interview conducted in U[place], with the assistance of the patient's . This is a 45-year-old right-handed woman who was admitted to [facility] during October 1-4, 2020 for sinus tachycardia and bitemporal headache. She has had decreased intensity of headache to 4-5/10 since being discharged from the hospital with amitriptyline 25mg PO QHS. She has also been taking propranolol 10mg PO BID as recommended by the Cardiology consultant for management of sinus tachycardia. 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 PCP: Dr. Erica Torres.  FROM 3/25/21: Interview conducted in [place], with the assistance of the patient's . The patient, now 46, states that she does not regularly have headaches anymore, but can still have a headache with 5/10 intensity in times of stress. Propranolol 10mg PO BID has been switched to atenolol 50mg PO Daily by nephrologist, Dr. Regina Frankel, although the patient says that this doctor is her cardiologist. Soon after switching to atenolol, the patient experienced two episodes on 3/22 - 3/23 of fatigue and headache with vertical double vision for a few minutes each. During one of these episodes, she and her  measured her BP to be 117/57 (HR 60). She stopped taking amitriptyline about 3 weeks ago, but has not noticed any new headaches, anxiety, depression, or insomnia. She is continuing to attend physical therapy sessions twice per week to relieve her lower back and leg pain. She currently does not have pain in her right leg, although she has some in her left hip and left leg at present.  FROM 6/25/21: Interview conducted in [place], with the assistance of the patient's . The patient continues to experience daily headaches at both temples, ranging from 5/10 to 10/10 in intensity. Headaches are triggered by stress and claustrophobia. The patient notes that her propranolol has been switched to atenolol by her PCP, and that she has been started on levothyroxine for low thyroid hormone levels, as well as completed a course of cephalexin to treat a presumed respiratory infections. She has also had a planned procedure for evaluation of the veins in her legs, to treat pain associated with them.  FROM 9/28/21: Interview conducted in [place], with the assistance of the patient's . The patient states that over the past 2-3 weeks, she has been experiencing recurrence of daily headaches at the right posterior head and at both temples, waxing and waning throughout the day, described as hot and sometimes pressure-like, sometimes radiating to the back of her neck. She has been experiencing worsening left knee pain, and now walks with a limp. She is concerned that the medicines she has been prescribed for pain as needed (acetaminophen, ibuprofen, cyclobenzaprine) may be contributing to her headaches. She has only been taking Atenolol 50mg on some days.  FROM 7/19/23: Interview conducted in [place], with the assistance of the patient's . The patient, now 48, continues to experience almost daily headaches, especially at the left forehead and behind the left eye, described as stabbing "like a knife" and sometimes like a lightning strike, lasting a few minutes at a time, but sometimes occurring multiple times a day. the headaches are associated with blurry vision with the left eye, light sensitivity, sound sensitivity, and room-spinning sensation, but not associated with nausea, vomiting, or falls. She continues to take the daily vitamin supplements that I recommended for headache prophylaxis, without adverse effects but also without significant benefit. She was seen in an Emergency Department in [place] on 4/29/23 after she slipped and fell on a wet surface, but she was able to be discharged after she got some pain relief. She also had a left knee meniscal tear and was prescribed Methocarbamol 750mg PO Daily as needed by Orthopedic surgeon, Dr. Michele De La O. She is now no longer requiring this medication, and she is being considered for a surgical repair of the left knee meniscal tear.  FROM 11/2/23: Interview conducted in [place], with the assistance of the patient's . Since I last saw the patient on 7/19/23, she continues to have daily episodes of stabbing pain and throbbing in both eyes and bilateral eyelid twitching, more often on the left. Headaches are aggravated by stress. She also experiences persistent pain in the throat, that feels like "inflammation." She is taking the daily vitamin supplements I recommended, in addition to the new medication Topiramate 50mg, but only once daily and not twice daily. She sometimes feels off balance, but has not had any falls, nor any drowsiness or changes in appetite. She underwent two lumbar epidural steroid injections to manage lower back pain, and has noticed possible side effects, including palpitations and hot sensation in the head, especially at night.  FROM 2/2/24: Interview conducted in [place], with the assistance of the patient's daughter. The patient has been taking Topiramate 50mg x 2 (100mg) PO BID, and has had decreased frequency and intensity of headaches, now occurring once every 1-2 weeks. She sometimes experiences tingling sensations at both sides of her face and torso, but this is not clearly related to her headaches nor to her use of Topiramate. She is due to have surgery on her left knee on 2/6/24. She has not had any recent medication changes or major illnesses.

## 2024-02-03 NOTE — REASON FOR VISIT
[Other: ____] : [unfilled] [Family Member] : family member [Source: ______] : History obtained from [unfilled] [Symptom and Test Evaluation] : symptom and test evaluation

## 2024-02-04 PROBLEM — Z01.810 PREOPERATIVE CARDIOVASCULAR EXAMINATION: Status: ACTIVE | Noted: 2024-02-04

## 2024-02-04 PROBLEM — S83.282A TEAR OF LATERAL MENISCUS OF LEFT KNEE, CURRENT, UNSPECIFIED TEAR TYPE, INITIAL ENCOUNTER: Status: ACTIVE | Noted: 2023-08-11

## 2024-02-04 PROBLEM — R07.89 ATYPICAL CHEST PAIN: Status: ACTIVE | Noted: 2023-07-31

## 2024-02-04 NOTE — ADDENDUM
[FreeTextEntry1] : I, Nitza Ortizvirginie, acted solely as a scribe for Dr. Delfina Diaz D.O., on this date 02/02/2024.   All medical record entries made by the Scribe were at my, Dr. Delfina Diaz D.O., direction and personally dictated by me on 02/02/2024. I have reviewed the chart and agree that the record accurately reflects my personal performance of the history, physical exam, assessment and plan. I have also personally directed, reviewed, and agreed with the chart.

## 2024-02-04 NOTE — DISCUSSION/SUMMARY
[FreeTextEntry1] : Ms. SERVANDO FAITH is an 48 year old female, history of GERD. Patient appears stable from a pulmonary perspective.  Preoperative for left knee meniscus surgery.  Excessive daytime sleepiness/snoring, rule out obstructive sleep apnea.

## 2024-02-04 NOTE — HISTORY OF PRESENT ILLNESS
[FreeTextEntry1] : SERVANDO FAITH is a 48 year old female referred for pre-op cardiac eval prior to arthroscopic meniscus surgery of left knee. She reports knife-like discomfort left anterior chest started about 2 weeks ago.  Feels under stress.  Has had this pain before that has come and gone.   Thinks emotional stress might be causing the pain.  Currently no pain.  Last episode 5 days ago.  Occurred at rest.  Had previously occurred also when constipated and forcing BM.  No chest pain with ambulation which is limited due to knee pain.  Duration of chest pain was momentary, on and off all day.   Reports when she had chest pain it was radiating to bilateral lower extremities and back.  First time she had chest pain was 2 weeks ago. She initially attributed it to constipation and pushing hard for BM.  Pain was in chest, back and legs.  She attributes back pain, which she had prior, to herniated disc.  From 2 weeks ago until 5 days ago she continued to have this pain on and off all day.  When she had pain and felt it in the chest it was localized to left anterior chest and also some radiation to left arm.  Was lasting for about 15 min at a time. She took aspirin for the pain which seemed to help.  She is not taking statin as prescribed. She takes 2 low dose aspirin regularly because a cardiologist years ago told her she had problems with the veins in her legs.  Soc - no smoking. Fam hist - non-contributory.

## 2024-02-04 NOTE — ASSESSMENT
[FreeTextEntry1] : Patient informed of potential long-term consequences of obstructive sleep apnea.  These consequences include but are not limited to chronic daytime sleepiness, difficulties with maintaining alertness while driving, increased risk for stroke, dementia, heart attack and cardiac arrhythmia. Obtained and reviewed pulmonary function test, NIOX results with patient today.  No acute or active pulmonary/sleep medicine contraindication for the proposed knee surgery.  Please make anesthesiology aware of the fact that Irina is a patient with probable obstructive sleep apnea, and take all necessary precautions in both perioperative and postoperative periods. Will perform lab sleep study for sleep apnea evaluation at a later date. Return for sleep follow up 2 weeks after receiving lab sleep study.

## 2024-02-04 NOTE — HISTORY OF PRESENT ILLNESS
[TextBox_4] : SERVANDO FAITH is a 48 year old female, with history of GERD, who presents to the office for pre-operative pulmonary/sleep medicine clearance for left knee meniscus surgery on 02/06/2024. Patient reports that she is feeling well overall with no respiratory complaints.  She has mild excessive daytime sleepiness and snoring, never went for sleep study as advised in the past.

## 2024-02-04 NOTE — PROCEDURE
[FreeTextEntry1] : Pulmonary Function Test obtained in office today which revealed: Spirometry: Within normal limits with no improvement post bronchodilator, Lung Volume: Within normal limits, Diffusion: Within normal limits.  ___ NIOX: 25

## 2024-02-04 NOTE — DISCUSSION/SUMMARY
[FreeTextEntry1] : Patient is at low estimated cardiac risk for surgery. No additional cardiac work up necessary pre-op. Advised follow up in 3 months, or sooner if chest pain recurs. [EKG obtained to assist in diagnosis and management of assessed problem(s)] : EKG obtained to assist in diagnosis and management of assessed problem(s)

## 2024-02-05 DIAGNOSIS — G47.19 OTHER HYPERSOMNIA: ICD-10-CM

## 2024-02-05 LAB — POCT - HEMOGLOBIN (HGB), QUANTITATIVE, TRANSCUTANEOUS: 13.1

## 2024-02-12 ENCOUNTER — TRANSCRIPTION ENCOUNTER (OUTPATIENT)
Age: 49
End: 2024-02-12

## 2024-02-13 ENCOUNTER — TRANSCRIPTION ENCOUNTER (OUTPATIENT)
Age: 49
End: 2024-02-13

## 2024-02-13 ENCOUNTER — APPOINTMENT (OUTPATIENT)
Dept: ORTHOPEDIC SURGERY | Facility: HOSPITAL | Age: 49
End: 2024-02-13
Payer: MEDICAID

## 2024-02-13 ENCOUNTER — OUTPATIENT (OUTPATIENT)
Dept: OUTPATIENT SERVICES | Facility: HOSPITAL | Age: 49
LOS: 1 days | End: 2024-02-13
Payer: MEDICAID

## 2024-02-13 VITALS
HEART RATE: 66 BPM | OXYGEN SATURATION: 97 % | RESPIRATION RATE: 20 BRPM | SYSTOLIC BLOOD PRESSURE: 130 MMHG | DIASTOLIC BLOOD PRESSURE: 72 MMHG

## 2024-02-13 VITALS
OXYGEN SATURATION: 98 % | TEMPERATURE: 98 F | HEIGHT: 66 IN | SYSTOLIC BLOOD PRESSURE: 113 MMHG | DIASTOLIC BLOOD PRESSURE: 68 MMHG | RESPIRATION RATE: 17 BRPM | WEIGHT: 279.77 LBS | HEART RATE: 60 BPM

## 2024-02-13 DIAGNOSIS — Z90.49 ACQUIRED ABSENCE OF OTHER SPECIFIED PARTS OF DIGESTIVE TRACT: Chronic | ICD-10-CM

## 2024-02-13 DIAGNOSIS — Z87.59 PERSONAL HISTORY OF OTHER COMPLICATIONS OF PREGNANCY, CHILDBIRTH AND THE PUERPERIUM: Chronic | ICD-10-CM

## 2024-02-13 DIAGNOSIS — S83.282D OTHER TEAR OF LATERAL MENISCUS, CURRENT INJURY, LEFT KNEE, SUBSEQUENT ENCOUNTER: ICD-10-CM

## 2024-02-13 PROCEDURE — 29880 ARTHRS KNE SRG MNISECTMY M&L: CPT | Mod: LT

## 2024-02-13 PROCEDURE — 29880 ARTHRS KNE SRG MNISECTMY M&L: CPT | Mod: AS,LT

## 2024-02-13 PROCEDURE — 97161 PT EVAL LOW COMPLEX 20 MIN: CPT

## 2024-02-13 PROCEDURE — C9399: CPT

## 2024-02-13 DEVICE — S&N FASTFIX 360 CURVED: Type: IMPLANTABLE DEVICE | Site: LEFT | Status: FUNCTIONAL

## 2024-02-13 DEVICE — IMP SYS REPAIR MENISCAL ROOT W/PEEK SWIVELOCK: Type: IMPLANTABLE DEVICE | Site: LEFT | Status: FUNCTIONAL

## 2024-02-13 RX ORDER — ALBUTEROL 90 UG/1
2 AEROSOL, METERED ORAL
Refills: 0 | DISCHARGE

## 2024-02-13 RX ORDER — OXYCODONE HYDROCHLORIDE 5 MG/1
5 TABLET ORAL ONCE
Refills: 0 | Status: DISCONTINUED | OUTPATIENT
Start: 2024-02-13 | End: 2024-02-13

## 2024-02-13 RX ORDER — TOPIRAMATE 25 MG
1 TABLET ORAL
Refills: 0 | DISCHARGE

## 2024-02-13 RX ORDER — ACETAMINOPHEN 500 MG
1000 TABLET ORAL ONCE
Refills: 0 | Status: COMPLETED | OUTPATIENT
Start: 2024-02-13 | End: 2024-02-13

## 2024-02-13 RX ORDER — IBUPROFEN 200 MG
1 TABLET ORAL
Qty: 28 | Refills: 0
Start: 2024-02-13 | End: 2024-02-19

## 2024-02-13 RX ORDER — OXYCODONE HYDROCHLORIDE 5 MG/1
1 TABLET ORAL
Qty: 16 | Refills: 0
Start: 2024-02-13 | End: 2024-02-16

## 2024-02-13 RX ORDER — HYDROMORPHONE HYDROCHLORIDE 2 MG/ML
0.2 INJECTION INTRAMUSCULAR; INTRAVENOUS; SUBCUTANEOUS
Refills: 0 | Status: DISCONTINUED | OUTPATIENT
Start: 2024-02-13 | End: 2024-02-13

## 2024-02-13 RX ORDER — SODIUM CHLORIDE 9 MG/ML
1000 INJECTION, SOLUTION INTRAVENOUS
Refills: 0 | Status: DISCONTINUED | OUTPATIENT
Start: 2024-02-13 | End: 2024-02-27

## 2024-02-13 RX ORDER — CEFAZOLIN SODIUM 1 G
3000 VIAL (EA) INJECTION ONCE
Refills: 0 | Status: COMPLETED | OUTPATIENT
Start: 2024-02-13 | End: 2024-02-13

## 2024-02-13 RX ORDER — ASPIRIN/CALCIUM CARB/MAGNESIUM 324 MG
1 TABLET ORAL
Qty: 56 | Refills: 0
Start: 2024-02-13 | End: 2024-03-11

## 2024-02-13 RX ORDER — ACETAMINOPHEN 500 MG
2 TABLET ORAL
Qty: 42 | Refills: 0
Start: 2024-02-13 | End: 2024-02-19

## 2024-02-13 RX ORDER — ONDANSETRON 8 MG/1
4 TABLET, FILM COATED ORAL ONCE
Refills: 0 | Status: DISCONTINUED | OUTPATIENT
Start: 2024-02-13 | End: 2024-02-27

## 2024-02-13 RX ORDER — HYDROMORPHONE HYDROCHLORIDE 2 MG/ML
0.5 INJECTION INTRAMUSCULAR; INTRAVENOUS; SUBCUTANEOUS
Refills: 0 | Status: DISCONTINUED | OUTPATIENT
Start: 2024-02-13 | End: 2024-02-13

## 2024-02-13 RX ORDER — ACETAMINOPHEN 500 MG
2 TABLET ORAL
Refills: 0 | DISCHARGE

## 2024-02-13 RX ORDER — ONDANSETRON 8 MG/1
1 TABLET, FILM COATED ORAL
Qty: 2 | Refills: 0
Start: 2024-02-13

## 2024-02-13 RX ORDER — APREPITANT 80 MG/1
40 CAPSULE ORAL ONCE
Refills: 0 | Status: COMPLETED | OUTPATIENT
Start: 2024-02-13 | End: 2024-02-13

## 2024-02-13 RX ORDER — CHLORHEXIDINE GLUCONATE 213 G/1000ML
1 SOLUTION TOPICAL ONCE
Refills: 0 | Status: COMPLETED | OUTPATIENT
Start: 2024-02-13 | End: 2024-02-13

## 2024-02-13 RX ADMIN — OXYCODONE HYDROCHLORIDE 5 MILLIGRAM(S): 5 TABLET ORAL at 13:01

## 2024-02-13 RX ADMIN — HYDROMORPHONE HYDROCHLORIDE 0.5 MILLIGRAM(S): 2 INJECTION INTRAMUSCULAR; INTRAVENOUS; SUBCUTANEOUS at 12:29

## 2024-02-13 RX ADMIN — APREPITANT 40 MILLIGRAM(S): 80 CAPSULE ORAL at 10:09

## 2024-02-13 RX ADMIN — OXYCODONE HYDROCHLORIDE 5 MILLIGRAM(S): 5 TABLET ORAL at 13:45

## 2024-02-13 RX ADMIN — SODIUM CHLORIDE 100 MILLILITER(S): 9 INJECTION, SOLUTION INTRAVENOUS at 12:46

## 2024-02-13 RX ADMIN — HYDROMORPHONE HYDROCHLORIDE 0.5 MILLIGRAM(S): 2 INJECTION INTRAMUSCULAR; INTRAVENOUS; SUBCUTANEOUS at 12:57

## 2024-02-13 RX ADMIN — CHLORHEXIDINE GLUCONATE 1 APPLICATION(S): 213 SOLUTION TOPICAL at 10:09

## 2024-02-13 NOTE — BRIEF OPERATIVE NOTE - COMMENTS
Patient tolerated the procedure well and was transported to the PACU in stable condition. PA present for 100% of case.   Patient may be discharged home per PACU protocol.

## 2024-02-13 NOTE — ASU DISCHARGE PLAN (ADULT/PEDIATRIC) - CARE PROVIDER_API CALL
Pb Alvarez  Orthopaedic Surgery  [address] [place] [address]  Phone: [phone]  Fax: [phone]  Established Patient  Follow Up Time: 2 weeks

## 2024-02-13 NOTE — BRIEF OPERATIVE NOTE - NSICDXBRIEFPOSTOP_GEN_ALL_CORE_FT
POST-OP DIAGNOSIS:  Tear of lateral meniscus of left knee 13-Feb-2024 12:13:35  Brian Ayers  Tear of medial meniscus of left knee 13-Feb-2024 12:13:26  Brian Ayers

## 2024-02-13 NOTE — BRIEF OPERATIVE NOTE - NSICDXBRIEFPROCEDURE_GEN_ALL_CORE_FT
PROCEDURES:  Arthroscopic meniscectomy, medial and lateral 13-Feb-2024 12:10:41 left knee Brian Ayers

## 2024-02-13 NOTE — ASU DISCHARGE PLAN (ADULT/PEDIATRIC) - SHOWER ONLY DURATION DAY(S)
May remove dressing on POD#5 and shower if no drainage from incision. Prior to that, Keep dressing clean, dry, and intact. Cover dressing with cast bag or double wrapped plastic for protection when showering.

## 2024-02-13 NOTE — ASU PREOP CHECKLIST - PATIENT PROBLEMS/NEEDS
Progress note - Palliative and Supportive Care   Jamie Dominguez 69 y o  male [id number]    Patient Active Problem List   Diagnosis    Renal cyst, right    Acute idiopathic gout of left foot    Back problem    Depression with anxiety    Essential hypertension    Glaucoma    Hypertriglyceridemia    Lumbago with sciatica, left side    Lumbar stenosis    JUNAID (obstructive sleep apnea)    Spinal stenosis of lumbar region with neurogenic claudication    Mixed hyperlipidemia    Bilateral hearing loss    Hyperglycemia    Cigarette nicotine dependence in remission    Adenocarcinoma, lung, left ([facility] [address] )    Encounter for central line care    Drug-induced neutropenia (HCC)    Left non-suppurative otitis media    Bilateral hearing loss due to cerumen impaction    Cancer of upper lobe of left lung (HCC)    Chronic back pain    Former cigarette smoker    History of gout    Hypertension    Proctocolitis    Pericardial effusion    Constipation    Labyrinthitis of both ears    CKD (chronic kidney disease) stage 2, GFR 60-89 ml/min    Platelets decreased (HCC)    Psoriasis    Left parietal hemorrhagic tumor    Thrombocytopenia (HCC)    Goals of care, counseling/discussion    Hypothyroidism    Irregular heart rate    Atrial fibrillation with rapid ventricular response (HCC)    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)    Dizziness    Chronic obstructive pulmonary disease, unspecified COPD type (HCC)    Hyponatremia    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)    Multifocal pneumonia    Acute respiratory failure with hypoxia (HCC)    Acute encephalopathy     Active issues specifically addressed today include:     Metastatic Lung cancer  Brain mets s/p resection x2  Seizures  Sepsis 2/2 PNA and iliacus abscess  Palliative patient  Unclear GOC  Grieving spouse with poor support system    Plan:  1   Symptom management -    Will defer to ICU at this time, symptoms seem well-controlled    2  Goals -    Planned for family meeting today with ICU team, IR procedure was delayed - unclear if meeting will happen today, spouse Afua Vasquez is overwhelmed and has no support, was unable to find someone to accompany her to the family meeting and having difficulty with patient's family and lack of engagement from them     Code Status: full - Level 1   Decisional apparatus:  Patient is not competent on my exam today  If competence is lost, patient's substitute decision maker would default to his wife Hayley Leonard and by act 169     Advance Directive / Living Will / POLST:  none    Interval history:   Pt seen and examined at bedside  Reports he feels tired and falls asleep multiple times during the interview  Denies pain, SOB, N and V  Moving his bowels, confirmed  MEDICATIONS / ALLERGIES:    Allergies   Allergen Reactions    Bee Venom     Benazepril Other (See Comments)     Angioedema     Latex Other (See Comments)     Burning of eyes at the dentist from the gloves    Meloxicam GI Intolerance    Penicillin G Rash       OBJECTIVE:    Physical Exam  Physical Exam  Constitutional:       General: He is not in acute distress  Appearance: He is obese  He is ill-appearing  He is not diaphoretic  Eyes:      Extraocular Movements: Extraocular movements intact  Cardiovascular:      Rate and Rhythm: Normal rate  Pulmonary:      Effort: Pulmonary effort is normal    Abdominal:      Tenderness: There is no abdominal tenderness  There is no guarding  Genitourinary:     Comments: guerra  Musculoskeletal:      Cervical back: No rigidity  Skin:     General: Skin is warm and dry  Coloration: Skin is pale  Neurological:      Mental Status: He is disoriented  Comments: AOx1 5   Psychiatric:      Comments: Poor historian         Lab Results:   I have personally reviewed pertinent labs  , CBC:   Lab Results   Component Value Date    WBC 2 10 (L) 08/11/2022    HGB 7 7 (L) 08/11/2022    HCT 23 5 (L) 08/11/2022     (H) 08/11/2022    PLT 88 (L) 08/11/2022    MCH 33 2 08/11/2022    MCHC 32 8 08/11/2022    RDW 18 3 (H) 08/11/2022    MPV 10 9 08/11/2022   , CMP:   Lab Results   Component Value Date    SODIUM 142 08/11/2022    K 3 4 (L) 08/11/2022     (H) 08/11/2022    CO2 24 08/11/2022    BUN 12 08/11/2022    CREATININE 0 60 08/11/2022    CALCIUM 8 1 (L) 08/11/2022    EGFR 102 08/11/2022     Imaging Studies: pertinent imaging reviewed  EKG, Pathology, and Other Studies: pertinent studies reviewed    Counseling / Coordination of Care    Total floor / unit time spent today 90 minutes (30 min family meeting, 60 minutes visit prior and prep)  Greater than 50% of total time was spent with the patient and / or family counseling and / or coordination of care   A description of the counseling / coordination of care: ongoing [address] discussion, emotional support provided Patient expressed no known problems or needs

## 2024-02-13 NOTE — ASU DISCHARGE PLAN (ADULT/PEDIATRIC) - ASU DC SPECIAL INSTRUCTIONSFT
Follow Surgeon's Instructions.  Weight Bearing Status: Left Lower Extremity is Weight-Bearing as Tolerated. No Brace Needed.   Reduce activities as necessary. Use of assistive devices as necessary.    May ICE operative extremity to help with pain and swelling.  30 min on and 60 min off, several times a day.  Always use a barrier between skin and ice, such as a pillowcase or sheet to protect skin from thermal injury.    Elevate operative Extremity to help reduce pain and swelling.    You were prescribed a narcotic pain medication. Do not drive while taking or combine with other narcotics  Take with food and drink plenty of water/eat foods high in fiber to help with constipation. May take an over the counter laxative if necessary.    Keep Dressing Clean/Dry/Intact.  Prior to day 5, Keep dressing clean, dry, and intact. Cover dressing with cast bag or double wrapped plastic for protection when showering.     May Remove Dressing in 5 days.   May Shower if no drainage from incision sites.   Do not scrub incision site. Soap and water may run over it. No direct water pressure  Pat incision site dry.    Follow Up in Office in 14 days.   Call office to confirm appointment.

## 2024-02-23 ENCOUNTER — APPOINTMENT (OUTPATIENT)
Dept: ORTHOPEDIC SURGERY | Facility: CLINIC | Age: 49
End: 2024-02-23
Payer: MEDICAID

## 2024-02-23 PROCEDURE — 99024 POSTOP FOLLOW-UP VISIT: CPT

## 2024-02-23 RX ORDER — IBUPROFEN 600 MG/1
600 TABLET, FILM COATED ORAL 4 TIMES DAILY
Qty: 60 | Refills: 0 | Status: ACTIVE | COMMUNITY
Start: 2024-02-23 | End: 1900-01-01

## 2024-02-23 RX ORDER — ACETAMINOPHEN EXTRA STRENGTH 500 MG/1
500 TABLET ORAL 3 TIMES DAILY
Qty: 90 | Refills: 0 | Status: ACTIVE | COMMUNITY
Start: 2024-02-23 | End: 1900-01-01

## 2024-02-23 NOTE — HISTORY OF PRESENT ILLNESS
[5] : 5 [Dull/Aching] : dull/aching [Tingling] : tingling [Shooting] : shooting [Intermittent] : intermittent [Meds] : meds [Standing] : standing [Walking] : walking [Stairs] : stairs [] : yes [de-identified] : 8/11/23: Patient is a 47yo F presenting for evaluation of b/l knee pain, L>R. Saw Dr. De La O, has been doing PT and complains pain has worsened since beginning Pt. Not working. She has been using cane to ambulate since 4/29 after she fell. Reports clicking and popping.  PMH: pre-diabetic   11/10/23: She returns with her spouse with continued pain L>R. She reports she developed itching, redness and swelling at injection site on the L. Overall her pain has increased.  She is using a cane. Patient was previously attending PT and stopped because she was experiencing severe pain in her lower back and knees. Patient states pain in left knee is worse when lying down  12/22/23: F/U left knee; Patient states PT is not helping. PT 2x a week. After PT, pain increases. Pain has stayed the same since last visit. Accompanied by her spouse.    2/23/24 Pt is here for her first Post op, S/P LT KNEE SCOPE PMM PLM on 2/13/24. pt feeling okay since surgery. Does report pain persistent pain in right knee [FreeTextEntry1] : left knee  [FreeTextEntry6] : numbness  [FreeTextEntry5] : 11/12/2023: Pt is here today to follow up on bilateral knee pain. Pt reports that she is still in pain. Pt states that she is unable to do PT because of the pain she is in. Pt also reports that the injections she has received did not help and she had a negative reaction to it.  [de-identified] : 07/13/23 [FreeTextEntry7] : bilateral knee to hip  [de-identified] : Jaylyn  [de-identified] : xrays

## 2024-02-23 NOTE — PHYSICAL EXAM
[de-identified] : LEFT KNEE:  No erythema, no discharge, no increased warmth to touch. Sutures intact. ROM consistent with immobility due to brace, strength testing deferred due to post-op status. Distally neurovascularly intact with common peroneal, saphenous, sural, tibial, superficial peroneal nerves intact. 2 DP pulse with capillary refill >2 seconds.   RIGHT Knee:                    ROM:  0-130 degrees   Lachman: Negative Pivot Shift: Negative Anterior Drawer: Negative Posterior Drawer / Sag: Negative Varus Stress 0 degrees: Stable Varus Stress 30 degrees: Stable Valgus Stress 0 degrees: Stable Valgus Stress 30 degrees: Stable Medial Fransico: Positive Lateral Fransico: Negative Patella Glide: 2+ Patella Apprehension: Negative Patella Grind: Negative   Palpation: Medial Joint Line: TTP Lateral Joint Line: Nontender Medial Collateral Ligament: Nontender Lateral Collateral Ligament/PLC: Nontender Distal Femur: Nontender Proximal Tibia: Nontender Tibial Tubercle: Nontender Distal Pole Patella: Nontender Quadriceps Tendon: Nontender &  Intact Patella Tendon: Nontender &  Intact Medial Distal Hamstring/PES: Nontender Lateral Distal Hamstring: Nontender & Stable Iliotibial Band: Nontender Medial Patellofemoral Ligament: Nontender Adductor: Nontender Proximal GSC-Plantaris: Nontender Calf: Supple & Nontender   Inspection: Deformity: No Erythema: No Ecchymosis: No Abrasions: No Effusion: Moderate Prepatellar Bursitis: No   Neurologic Exam: Sensation L4-S1: Grossly Intact   Motor Exam: Quadriceps: 5 out of 5 Hamstrings: 5 out of 5 EHL: 5 out of 5 FHL: 5 out of 5 TA: 5 out of 5 GS: 5 out of 5   Circulatory/Pulses: Dorsalis Pedis: 2+ Posterior Tibialis: 2+   Additional Pertinent Findings: None   Contralateral Knee:               ROM: 0-130 degrees   Other Pertinent Findings: None    X-RAYS of the RIGHT knee (4 views, including AP, Lateral, Merchant, and Tunnel): no fracture or dislocation

## 2024-02-26 ENCOUNTER — APPOINTMENT (OUTPATIENT)
Dept: MRI IMAGING | Facility: CLINIC | Age: 49
End: 2024-02-26
Payer: MEDICAID

## 2024-02-26 PROCEDURE — 73721 MRI JNT OF LWR EXTRE W/O DYE: CPT | Mod: RT

## 2024-03-08 ENCOUNTER — APPOINTMENT (OUTPATIENT)
Dept: CARDIOLOGY | Facility: CLINIC | Age: 49
End: 2024-03-08
Payer: MEDICAID

## 2024-03-08 VITALS
HEART RATE: 74 BPM | DIASTOLIC BLOOD PRESSURE: 78 MMHG | BODY MASS INDEX: 44.25 KG/M2 | OXYGEN SATURATION: 98 % | WEIGHT: 292 LBS | HEIGHT: 68 IN | SYSTOLIC BLOOD PRESSURE: 110 MMHG

## 2024-03-08 DIAGNOSIS — E78.00 PURE HYPERCHOLESTEROLEMIA, UNSPECIFIED: ICD-10-CM

## 2024-03-08 PROCEDURE — 99213 OFFICE O/P EST LOW 20 MIN: CPT

## 2024-03-08 NOTE — DISCUSSION/SUMMARY
[FreeTextEntry1] : Discussed various strategies for evaluating for CAD. Offered stress test vs CAC.  She is unable to walk on treadmill. Referred for calcium scoring.

## 2024-03-08 NOTE — REASON FOR VISIT
[Symptom and Test Evaluation] : symptom and test evaluation [Hyperlipidemia] : hyperlipidemia [Spouse] : spouse

## 2024-03-08 NOTE — HISTORY OF PRESENT ILLNESS
[FreeTextEntry1] : Here for follow up. Status post uncomplicated knee surgery. Reports no recent chest pain. Concerned about cardiac risk.

## 2024-03-08 NOTE — PHYSICAL EXAM
[Well Developed] : well developed [Well Nourished] : well nourished [No Acute Distress] : no acute distress [Obese] : obese [Normal Conjunctiva] : normal conjunctiva [No Carotid Bruit] : no carotid bruit [Normal Venous Pressure] : normal venous pressure [Normal S1, S2] : normal S1, S2 [No Murmur] : no murmur [No Gallop] : no gallop [No Rub] : no rub [Clear Lung Fields] : clear lung fields [Good Air Entry] : good air entry [No Respiratory Distress] : no respiratory distress  [Soft] : abdomen soft [Normal Bowel Sounds] : normal bowel sounds [No Masses/organomegaly] : no masses/organomegaly [Non Tender] : non-tender [No Edema] : no edema [Normal Gait] : normal gait [No Clubbing] : no clubbing [No Cyanosis] : no cyanosis [No Rash] : no rash [No Varicosities] : no varicosities [Moves all extremities] : moves all extremities [No Skin Lesions] : no skin lesions [Normal Speech] : normal speech [No Focal Deficits] : no focal deficits [Alert and Oriented] : alert and oriented [Normal memory] : normal memory

## 2024-03-22 ENCOUNTER — APPOINTMENT (OUTPATIENT)
Dept: ORTHOPEDIC SURGERY | Facility: CLINIC | Age: 49
End: 2024-03-22
Payer: MEDICAID

## 2024-03-22 DIAGNOSIS — S83.281A OTHER TEAR OF LATERAL MENISCUS, CURRENT INJURY, RIGHT KNEE, INITIAL ENCOUNTER: ICD-10-CM

## 2024-03-22 DIAGNOSIS — Z98.890 OTHER SPECIFIED POSTPROCEDURAL STATES: ICD-10-CM

## 2024-03-22 PROCEDURE — 99214 OFFICE O/P EST MOD 30 MIN: CPT | Mod: 25

## 2024-03-22 PROCEDURE — J3490M: CUSTOM

## 2024-03-22 PROCEDURE — 20610 DRAIN/INJ JOINT/BURSA W/O US: CPT | Mod: 79,RT

## 2024-03-22 RX ORDER — DICLOFENAC SODIUM 75 MG/1
75 TABLET, DELAYED RELEASE ORAL TWICE DAILY
Qty: 30 | Refills: 0 | Status: ACTIVE | COMMUNITY
Start: 2024-03-22 | End: 1900-01-01

## 2024-03-26 NOTE — PHYSICAL EXAM
[de-identified] : LEFT KNEE:  No erythema, no discharge, no increased warmth to touch. Healed incisions. ROM 0-130, strength testing 5/5. Distally neurovascularly intact with common peroneal, saphenous, sural, tibial, superficial peroneal nerves intact. 2 DP pulse with capillary refill >2 seconds.  RIGHT Knee:                    ROM:  0-130 degrees   Lachman: Negative Pivot Shift: Negative Anterior Drawer: Negative Posterior Drawer / Sag: Negative Varus Stress 0 degrees: Stable Varus Stress 30 degrees: Stable Valgus Stress 0 degrees: Stable Valgus Stress 30 degrees: Stable Medial Fransico: Positive Lateral Fransico: Negative Patella Glide: 2+ Patella Apprehension: Negative Patella Grind: Negative   Palpation: Medial Joint Line: TTP Lateral Joint Line: Nontender Medial Collateral Ligament: Nontender Lateral Collateral Ligament/PLC: Nontender Distal Femur: Nontender Proximal Tibia: Nontender Tibial Tubercle: Nontender Distal Pole Patella: Nontender Quadriceps Tendon: Nontender &  Intact Patella Tendon: Nontender &  Intact Medial Distal Hamstring/PES: Nontender Lateral Distal Hamstring: Nontender & Stable Iliotibial Band: Nontender Medial Patellofemoral Ligament: Nontender Adductor: Nontender Proximal GSC-Plantaris: Nontender Calf: Supple & Nontender   Inspection: Deformity: No Erythema: No Ecchymosis: No Abrasions: No Effusion: Moderate Prepatellar Bursitis: No   Neurologic Exam: Sensation L4-S1: Grossly Intact   Motor Exam: Quadriceps: 5 out of 5 Hamstrings: 5 out of 5 EHL: 5 out of 5 FHL: 5 out of 5 TA: 5 out of 5 GS: 5 out of 5   Circulatory/Pulses: Dorsalis Pedis: 2+ Posterior Tibialis: 2+

## 2024-03-26 NOTE — DATA REVIEWED
[MRI] : MRI [Right] : of the right [Knee] : knee [Report was reviewed and noted in the chart] : The report was reviewed and noted in the chart [I independently reviewed and interpreted images and report] : I independently reviewed and interpreted images and report [I reviewed the films/CD] : I reviewed the films/CD [FreeTextEntry1] : [facility] 2/26/24 RIGHT KNEE 1. Progression of patellofemoral compartment arthrosis with subchondral edema in the medial femoral trochlea with similar appearing slight medial meniscal tearing and medial compartment arthrosis 2. Mild chronic ACL sprain, mild chronic MCL sprain and mild effusion and synovitis with mild prepatellar soft tissue swelling without acute osseous injury or lateral meniscal tear

## 2024-03-26 NOTE — HISTORY OF PRESENT ILLNESS
[de-identified] : 8/11/23: Patient is a 47yo F presenting for evaluation of b/l knee pain, L>R. Saw Dr. De La O, has been doing PT and complains pain has worsened since beginning Pt. Not working. She has been using cane to ambulate since 4/29 after she fell. Reports clicking and popping.  PMH: pre-diabetic   11/10/23: She returns with her spouse with continued pain L>R. She reports she developed itching, redness and swelling at injection site on the L. Overall her pain has increased.  She is using a cane. Patient was previously attending PT and stopped because she was experiencing severe pain in her lower back and knees. Patient states pain in left knee is worse when lying down  12/22/23: F/U left knee; Patient states PT is not helping. PT 2x a week. After PT, pain increases. Pain has stayed the same since last visit. Accompanied by her spouse.    2/23/24 Pt is here for her first Post op, S/P LT KNEE SCOPE PMM PLM on 2/13/24. pt feeling okay since surgery. Does report pain persistent pain in right knee  3/22/24: Patient here for POV2 s/p left knee scope, pmm, and review right knee MRI results

## 2024-03-26 NOTE — DISCUSSION/SUMMARY
[de-identified] : In regards to LEFT knee....  Assessment & Plan: The patient is approximately 5 weeks postoperative. Incision(s) appear to be healing well. The patient is instructed in wound management. The patient's post-op plan, protocol and activity modifications have been thoroughly discussed and the patient expressed understanding. The patient will control pain as discussed & continue ice and elevation as needed. The patient otherwise may advance activity as discussed.   Prescription Medications Ordered: [None]   Physical Therapy: [Continue per protocol, new prescription given today, continue home exercise program]   Braces/DME Ordered: [No brace needed any longer]   Activity/Work/Sports Status: [Continue out of work/gym/sports]   Follow-Up: [6 weeks]   In regards to RIGHT knee.... - Reviewed and discussed MRI results with patient. Discussed considering CSI vs HA inj. CSI in right knee today - judi well - Discussed consulting with knee replacement specialist  In regards to RIGHT ankle..... - Advised patient to consult with PCP about swelling - If ankle swelling is an orthopedic issue, will refer to foot & ankle specialist

## 2024-03-26 NOTE — ASSESSMENT
[FreeTextEntry1] : The natural progression of osteoarthritis was explained to the patient.  We discussed the possible treatment options from conservative to operative.  These included NSAIDS, Glucosamine and Chondrotin sulfate, and Physical Therapy as well different types of injections.  We also discussed that at some point they may progress to needed a TKA.  Information and pamphlets were given.  We discussed their diagnosis and treatment options at length including surgical and non-surgical options. We will first attempt conservative treatment with activity modification, PT, icing, weight loss, and anti-inflammatory medications. We discussed the possible of injections (steroid and viscosupplementation) in the future. The patient was provided with a PT prescription to work on ROM, hip ER/abductors strengthening, quad/hamstring stretches and strengthening, and other exercises on the Knee Arthritis Protocol.  Dx / Natural History: The patient was advised of the diagnosis.  The natural history of the pathology was explained in full to the patient in layman's terms.  Several different treatment options were discussed and explained in full to the patient including the risks and benefits of both surgical and non-surgical treatments.  All questions and concerns were answered.   Pain Guide Activities: The patient was advised to let pain guide the gradual advancement of activities.  Physical Therapy: The patient was provided with a prescription for Physical Therapy.  Icing: The patient was advised to apply ice (wrapped in a towel or protective covering) to the area daily (20 minutes at a time, 2-4X/day).

## 2024-03-26 NOTE — PROCEDURE
[FreeTextEntry3] : Patient Identification  Name/: Verbal with patient and/or family    Procedure Verification:  Procedure confirmed with patient or family/designee  Consent for procedure: Verbal Consent Given  Relevant documentation completed, reviewed, and signed  Clinical indications for procedure confirmed    Time-out with all members of procedure team immediately prior to procedure:  Correct patient identified. Agreement on procedure. Correct side and site.    US GUIDANCE KNEE INJECTION (STEROID) - RIGHT  After verbal consent and identification of the correct patient and correct site, the superolateral right knee was prepped using alcohol swabs and betadine. This was allowed time to air dry. A mixture of 1cc Celestone 6mg/ml, 2cc Lidocaine 1%, and 2cc Bupivacaine 0.5% was injected with US guidance into the suprapatellar pouch using a sterile 22G needle after ethyl chloride spray for skin anesthesia. The patient tolerated the procedure well. After-care instructions were provided and included instructions to ice the area and to call if redness, pain, or fever develop.Visualization of the needle and placement of the injection was performed without any complications. Ultrasound was used for visualization, precise injection in area of tear, and / or prior failure or difficult injection

## 2024-03-27 ENCOUNTER — APPOINTMENT (OUTPATIENT)
Dept: ORTHOPEDIC SURGERY | Facility: CLINIC | Age: 49
End: 2024-03-27
Payer: MEDICAID

## 2024-03-27 VITALS — WEIGHT: 292 LBS | BODY MASS INDEX: 44.25 KG/M2 | HEIGHT: 68 IN

## 2024-03-27 VITALS — BODY MASS INDEX: 44.25 KG/M2 | HEIGHT: 68 IN | WEIGHT: 292 LBS

## 2024-03-27 DIAGNOSIS — M76.821 POSTERIOR TIBIAL TENDINITIS, RIGHT LEG: ICD-10-CM

## 2024-03-27 DIAGNOSIS — S93.491A SPRAIN OF OTHER LIGAMENT OF RIGHT ANKLE, INITIAL ENCOUNTER: ICD-10-CM

## 2024-03-27 DIAGNOSIS — M25.879 OTHER SPECIFIED JOINT DISORDERS, UNSPECIFIED ANKLE AND FOOT: ICD-10-CM

## 2024-03-27 PROCEDURE — 99214 OFFICE O/P EST MOD 30 MIN: CPT

## 2024-03-27 PROCEDURE — 73610 X-RAY EXAM OF ANKLE: CPT | Mod: 50

## 2024-03-27 RX ORDER — MELOXICAM 7.5 MG/1
7.5 TABLET ORAL
Qty: 30 | Refills: 0 | Status: ACTIVE | COMMUNITY
Start: 2024-03-27 | End: 1900-01-01

## 2024-03-27 NOTE — IMAGING
[de-identified] : Patient ambulates with a Antalgic gait   Right Foot and Ankle: Inspection: Erythema: None Swelling: mod swelling laterally, anteriorly, medially Ecchymosis: None Abrasions: None Rashes: None Surgical Scars: None Effusion: None Atrophy: None Deformity: None Pes [place] Valgus: Negative Pes Cavus: Negative Hallux Valgus: Negative Clawtoe/Hammertoe: Negative  Palpation: Crepitus: None Proximal Fibula: Nontender Distal Fibula: Nontender Medial Malleolus: tender Lateral Malleolus: tender AITFL: tender PITFL: Nontender ATFL: tender CFL: tender Deltoid: tender Calcaneus: Nontender Talar Head/Neck: Nontender Lateral Process of Talus: Nontender Anterior Facet of Calcaneus: Nontender Peroneal Tendons: tender Posterior Tibialis: Nontender Achilles Tendon: Nontender & Intact Achilles Insertion: Nontender Retrocalcaneal Bursa: Nontender Anterior Capsule: tender Subtalar Joint: Nontender Talonavicular Joint: Nontender Calcaneocuboid Joint: Nontender Heel pad: Nontender Medial Tubercle of Calcaneus: Nontender Plantar Fascia: Nontender Midfoot: Nontender Forefoot: Nontender Sesamoids: Nontender  ROM: Ankle Dorsiflexion: 0 degrees Ankle Plantar Flexion: 35 degrees Eversion: 15 degrees Inversion: 25 degrees, painful  Motor: Dorsiflexion: 5 out of 5 Plantar Flexion: 5 out of 5 Inversion: 5  out of 5 Eversion: 5 out of 5, painful  Provocative Testing: Anterior Drawer: Positive Syndesmosis Squeeze Test: Negative Circumduction test: Negative Resisted ER: Painful  Axial Grind 1st MTP: Painless Neurologic Exam: L4-S1 Sensation: Grossly Intact Tinels: Negative  Vascular Exam/Pulses: Dorsalis Pedis: 2+ Posterior Tibialis: 2+ Capillary Refill: <2 Seconds Other Exams: None Pertinent Contralateral Findings: None

## 2024-03-27 NOTE — DATA REVIEWED
[FreeTextEntry1] : 3 v r ankle neg for fx or dislocation mild distal tibia osteophyte/anterior tibtalar impingement

## 2024-03-27 NOTE — DISCUSSION/SUMMARY
[de-identified] : Hx of fall/ankle sprain TTP along ATFL CFL Anterior capsule Dx w/ pt symptoms of ankle sprain with reactive synovitis Rec: Tall [place] + Mobic RTC 3 weeks  next visit: If improved ASO and PT If not improved continue Boot for another 2 weeks

## 2024-03-27 NOTE — HISTORY OF PRESENT ILLNESS
[Dull/Aching] : dull/aching [Intermittent] : intermittent [Nothing helps with pain getting better] : Nothing helps with pain getting better [Standing] : standing [Walking] : walking [Exercising] : exercising [Stairs] : stairs [Full time] : Work status: full time [de-identified] : Date of Injury/Onset: Patient states she had a fall 4/2023 Pain: At Rest: Level at pain is 5 can go up to a 9 with motion  With Activity: Patient states she has pain while walking, standing going up and down stairs. Limited rom  Mechanism of injury: none This is NOT a Work Related Injury being treated under Worker's Compensation. n/a This is NOT an athletic injury occurring associated with an interscholastic or organized sports team. n/a Quality of symptoms: swelling all the time  Improves with: Ibuprofen, Diclofenac  Worse with: walking standing stairs Prior treatment: none  Prior Imaging: none  Reports Available For Review Today: none  Out of work/sport: n/a School/Sport/Position/Occupation: not working Personal goal: Additional Information: 03/27/2024 :SERVANDO FAITH , a 49 year old female, presents today for bilateral ankle pain onset one year has had no treatment at this time, but did have surgery to right knee. She has been taking diclofanac and Ibuprofin which helps for the ankles. She indicates the pain is worse on right isde. Pt states she had a mechanical last year she went to the emergency room but no one evaluated her ankle. Pt endorses pain along anterolateral joint line ATFL CFL. No formal tx therapy injections or surgery. Here for further eval and management. [] : no

## 2024-03-27 NOTE — REASON FOR VISIT
[FreeTextEntry2] : 03/27/2024 :SERVANDO MARCELL , a 49 year old female, presents today for bilateral ankles

## 2024-04-03 NOTE — ED PROVIDER NOTE - TOBACCO USE
TRANSPLANT NEPHROLOGY :   OUTPATIENT CLINIC NOTE      SERVICE DATE : 04/03/2024     HPI:    Mr. Hanson is a 71 y.o. male with CKD 5 A3 attributed to DM, currently pre-dialysis (follows with Dr. 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     Pt was last seen 1/2023. Has been listed for kidney transplant since 8/2021 as status 7 due to pending eval.     Most recent TTE 3/2024 with LVEF 60-65%, elevated LVEDP, severely elevated RVSP, severe TR (new). lASt stress tets 1/2023 with normal rest imaging, no stress images obtained due to h/o 2nd degree heart block.     Currently with c/o dyspnea with mod exertion. Uses cane to ambulate, wheel chair for longer distances. No recent falls.     S/p colonoscopy and EGD recently. No concern for malignancy.     Has adequate social support. No potential donors.    Denied chest pain, SOB, LOPEZ, Palpitation. Normal urination and bowel movement. Normal gait and no weakness of arms/legs. No cough, runny nose, sore throat, cold symptoms, or rash. No hearing loss. Normal vision.No problems with his sleep, mood and function. No recent infection, hospitalization, surgery or ER visits.    ROS:  Review of  14 systems was performed system by system. See HPI. Otherwise, the symptoms were negative.    PAST MEDICAL HISTORY:  Past Medical History:   Diagnosis Date    DM (diabetes mellitus) (CMS/HCC)     HTN (hypertension)     Other specified abnormal immunological findings in serum 10/11/2021    Hepatitis B core antibody positive    Personal history of malignant neoplasm of prostate     History of malignant neoplasm of prostate        PAST SURGICAL HISTORY:  Past Surgical History:   Procedure Laterality Date    CHOLECYSTECTOMY  10/23/2023    Lap Cholecystectomy    OTHER SURGICAL HISTORY  09/29/2021    Cyst  "excision    OTHER SURGICAL HISTORY  09/29/2021    Arteriovenous fistula creation procedure    OTHER SURGICAL HISTORY  09/29/2021    Prostate surgery    OTHER SURGICAL HISTORY  09/29/2021    Colonoscopy        SOCIAL HISTORY:  Social History     Socioeconomic History    Marital status:      Spouse name: Not on file    Number of children: Not on file    Years of education: Not on file    Highest education level: Not on file   Occupational History    Not on file   Tobacco Use    Smoking status: Never    Smokeless tobacco: Never   Vaping Use    Vaping Use: Never used   Substance and Sexual Activity    Alcohol use: Never    Drug use: Never    Sexual activity: Defer   Other Topics Concern    Not on file   Social History Narrative    Not on file     Social Determinants of Health     Financial Resource Strain: Patient Declined (10/23/2023)    Overall Financial Resource Strain (CARDIA)     Difficulty of Paying Living Expenses: Patient declined   Food Insecurity: Not on file   Transportation Needs: Patient Declined (10/23/2023)    PRAPARE - Transportation     Lack of Transportation (Medical): Patient declined     Lack of Transportation (Non-Medical): Patient declined   Physical Activity: Not on file   Stress: Not on file   Social Connections: Not on file   Intimate Partner Violence: Not on file   Housing Stability: Unknown (10/23/2023)    Housing Stability Vital Sign     Unable to Pay for Housing in the Last Year: Patient refused     Number of Places Lived in the Last Year: 1     Unstable Housing in the Last Year: Patient refused       FAMILY HISTORY:  Family History   Problem Relation Name Age of Onset    Diabetes Sister      Diabetes Brother         MEDICATION LIST:  Current Outpatient Medications   Medication Instructions    acetaminophen (Tylenol) 325 mg tablet 2 tablets, oral, Every 6 hours PRN    amLODIPine (Norvasc) 5 mg tablet 1 tablet, oral, Daily    BD Ultra-Fine Joan Pen Needle 32 gauge x 5/32\" needle     " "blood sugar diagnostic (Ritchie Blood Glucose Test Strip) strip 1 strip    calcitriol (ROCALTROL) 0.25 mcg, oral, Daily RT    calcium carbonate (TUMS) 500 mg, oral    carboxymethylcellulose (Refresh Plus) 0.5 % ophthalmic solution Instill 1 drop into both eyes 2-4x/day as needed for dryness.    carvedilol (COREG) 3.125 mg, oral, 2 times daily    desonide (DesOwen) 0.05 % cream Desonide 0.05 % External Cream   Refills: 0       Active    doxazosin (Cardura) 8 mg tablet 1 tablet, oral, Nightly    ferrous gluconate 236 mg (27 mg iron) tablet 1 tablet, oral, Daily    ferrous sulfate 7.5 mg iron/0.5 mL syringe oral    FreeStyle Mick 2 Sensor kit     FreeStyle Mick sensor system (FreeStyle Mick 2 Sensor) kit 1 DEVICE EVERY 14 DAYS.    gabapentin (Neurontin) 100 mg capsule 1 capsule, oral, 3 times daily    guanFACINE (Tenex) 1 mg tablet     guanFACINE (TENEX) 1 mg, oral    hydrALAZINE (APRESOLINE) 100 mg, oral, 3 times daily    insulin detemir (LEVEMIR FLEXTOUCH) 30 Units, subcutaneous, 2 times daily, 18 units this am    insulin lispro (HumaLOG KwikPen Insulin) 100 unit/mL injection subcutaneous    INSULIN LISPRO SUBQ BS:150-200: 4 units 201-250: 6 units 251-300: 8 units 301-350: 12 units 351-400: 14 units Blood sugar greater than 400: 16u    lidocaine (Lidoderm) 5 % patch 1 patch, transdermal    magnesium oxide (Mag-Ox) 400 mg tablet 1 tablet, oral, Daily    omeprazole (PRILOSEC) 20 mg, oral, Daily RT    OneTouch Ultra Test strip OneTouch Ultra In Vitro Strip   Quantity: 300  Refills: 0        Start : 6-Mar-2020   Active    oxyCODONE (ROXICODONE) 5 mg, oral, Every 6 hours PRN    pen needle, diabetic 32 gauge x 5/32\" needle 1 EACH 6 TIMES DAILY.    polyethylene glycol 236-22.74-6.74 -5.86 gram solution TAKE AS DIRECTED.    rosuvastatin (Crestor) 20 mg tablet 1 tablet, oral, Daily    sodium polystyrene (SPS, with sorbitol,) 15 gram/60 mL suspension oral    sodium zirconium cyclosilicate (LOKELMA) 10 g, oral, Daily RT    " "sodium zirconium cyclosilicate (LOKELMA) 10 g, oral, Daily RT    torsemide (Demadex) 10 mg tablet 1 tablet, oral, Daily    torsemide (DEMADEX) 100 mg, oral, Daily RT    zinc sulfate (ZINCATE) 220 mg, oral, Daily RT       ALLERGY  Allergies   Allergen Reactions    Terazosin Unknown     Did not feel well on this medication    Codeine Itching     itching    Tramadol Itching       PHYSICAL EXAM:    Visit Vitals  /53   Pulse 59   Temp 36.4 °C (97.6 °F)   Ht 1.854 m (6' 1\")   Wt 87.1 kg (192 lb)   SpO2 95%   BMI 25.33 kg/m²   Smoking Status Never   BSA 2.12 m²          General Appearance - NAD, A&Ox3   HEENT - Supple. Not pale. No jaundice. No JVD or LAD  CVS - RRR. Normal S1/S2. 3/6 HSM+, no rub or gallop  Lungs- clear to auscultation bilaterally  Abdomen - soft , NT, ND, no guarding. No hepatosplenomegaly.  Musculoskeletal /Extremities - 1+ edema b/l LE. Full ROM. No joint tenderness.   Neuro/Psych - appropriate mood and affect. Motor power V/V all extremities. CN I -XII were grossly intact.  Skin - No visible rash      LABS:    Lab Results   Component Value Date    CREATININE 10.76 (H) 10/24/2023    BUN 88 (H) 10/24/2023     10/24/2023    K 4.3 10/24/2023     10/24/2023    CO2 23 10/24/2023     Lab Results   Component Value Date    CALCIUM 8.7 10/24/2023    PHOS 5.0 (H) 10/24/2023     Lab Results   Component Value Date    WBC 5.5 10/24/2023    HGB 7.3 (L) 10/24/2023    HCT 23.9 (L) 10/24/2023    MCV 98 10/24/2023    PLT 70 (L) 10/24/2023     No results found for: \"IRON\", \"TIBC\", \"FERRITIN\"  No results found for: \"TACROLIMUS\", \"CMVPCRIU\", \"BKVIRPCRQN\", \"EBVDNAPCR\"  No results found for: \"CMVDNAPCR\", \"BKVDNAPCR\", \"EBVDNAPCR\"      ASSESSMENT AND PLAN:    Pt appears to be a marginal candidate at this time given ongoing cardiac issues.     Recent TTE with severe TR, significantly elevated RVSP (86.9 mmHg). Needs optimization of volume status and further cardiac eval as indicated.     Advised pt to cont " follow up with primary nephrologist to optimize diuretics and/or consider starting RRT. Will need follow up with cardiology.     Pt to remains status 7 till above issues are addressed. RTC in 6 months.      Rest of the eval per protocol. Update cancer screening per age/sex.      The case will be presented at the selection committee at the [facility] [place].  The final decision from the committee will be sent out to notify the patient/primary care physician/ nephrologist. The above recommendations were discussed with the patient at length.      In addition, the following were also discussed:    - Risks and benefits of transplantation, both short-term and long-term    - Risk of primary graft non-function, DGF, SGF, rejection, primary disease recurrence, return to dialysis    - Risks of immunosuppression including infections, CA, CV risk    - Need for compliance with medications and medical care in general       The patient expressed understanding of the above and wishes to proceed.  I answered all of his questions. I urged the patient to look for living donors.     - I have spent over 40 minutes with the patient, reviewing medical record, lab result , CXR result and other specialty's notes. More than 50% of the time was spent in counseling, explaining about the transplantation and answering the questions. I also reviewed the medical record, blood test results, imaging and previous studies which were obtained from the nephrologists.    Never smoker

## 2024-04-23 ENCOUNTER — APPOINTMENT (OUTPATIENT)
Dept: ORTHOPEDIC SURGERY | Facility: CLINIC | Age: 49
End: 2024-04-23

## 2024-04-25 NOTE — ASU PATIENT PROFILE, ADULT - DATE/TIME OF ACCEPTANCE
"  Caller: Clarissa Matos \"Cathy\"    Relationship: Self    Best call back number: [phone]    What is the best time to reach you: MID MORNING    Who are you requesting to speak with (clinical staff, provider,  specific staff member): PROVIDER    Do you know the name of the person who called: DOUG BOWERS    What was the call regarding: PT CALLING, STATES THAT SHE WOULD LIKE TO DISCUSS HER PLAN OF CARE WITH DOUG BOWERS.   "
I spoke with Ms. Matos and she would like to proceed with the epidural injection. She will just deal with the side effects.
I spoke with Ms. Matos today and she wanted to know if she could switch her lumbar MBB's to lumbar epidurals? She also wanted to know if a different anti-inflammatory could be used instead of a steroid?  She states that she has side effects with steroids of face redness and feeling like she is on fire for a few days. She states that she has had an epidural in the past and she got good pain relief for about 8 months. She states that she is willing to deal with the side effects from the steroid for a few days if a different medication can't be used. She has low back pain that radiates to her buttocks, bilateral hips and down her left leg to her knee. Please advise  
Ok.    Meghan - I have placed a new order for bilateral L5/S1 LTFESI. Will you please change her upcoming procedure from lumbar MBB to lumbar TFESI?  Thank you. 
Please let her know that I discussed this with Dr. Chicas.  We can change the procedure to an Epidural, but the medication used for the procedure will always be a steroid.  Dr. Chicas could possibly use a lower dose steroid, but we cannot guarantee the effectiveness of the procedure.  It sounds like she would respond best to an epidural injection given her current symptoms. Let me know how she would like to proceed.      Another option would be neurosurgical evaluation versus spinal cord stimulation.  
This is a historical note converted from Kelly. Formatting and pictures may have been removed.  Please reference Kelly for original formatting and attached multimedia. Chief Complaint  5 week s/p Lt Reverse TSA, Sx-4/3/20-Gl-7/29/20, ?and 6 week s/p Rt Reverse TSA, Sx-3/31/20-gl-6/29/20. ? Sates the Left shoulder has more pain than the Right shoulder. ?Home Health 2 times a week. ?Pain level ?Rt -1 and Left-5. ?No BP because of the pain  History of Present Illness  3/31/2020: Right reverse total shoulder arthroplasty  4/3/2020: Left reverse total shoulder arthroplasty  ?   She returns today.? She is working with home health physical therapy. ?Her pains improved [1]  Physical Exam  Vitals & Measurements  HT:?157.48?cm? WT:?82.55?kg? BMI:?33.29?  Incision healed. ?Distally is neurovascular intact  Assessment/Plan  1.?Status post reverse arthroplasty of shoulder?Z96.619  Improving status post above. ?Continue therapy. ?Discontinue the slings. ?I will see her back in 6 weeks radiographs of bilateral shoulders  Ordered:  Clinic Follow up, *Est. 06/24/20 3:00:00 CDT, Order for future visit, Status post reverse arthroplasty of shoulder, Orthopaedics  Post-Op follow-up visit [id number] , Status post reverse arthroplasty of shoulder, Orthopaedics Clinic, 05/13/20 15:46:00 CDT  ?  Orders:  acetaminophen-HYDROcodone, 1 tab(s), Oral, q6hr, PRN PRN as needed for pain, # 28 tab(s), 0 Refill(s), Pharmacy: [facility]/pharmacy #[id number], 157.48, cm, Height/Length Dosing, 04/15/20 9:37:00 CDT, 82.55, kg, Weight Dosing, 04/15/20 9:37:00 CDT  Referrals  Clinic Follow up, *Est. 06/24/20 3:00:00 CDT, Order for future visit, Status post reverse arthroplasty of shoulder, [facility]   Problem List/Past Medical History  Ongoing  Acid reflux  Hyperlipidemia  Hypertension  Obesity  Osteoarthritis  Osteoporosis  primary bilary cirrhosis  Primary osteoarthritis of left knee  Rheumatoid arthritis  Status post reverse arthroplasty of 
shoulder  Wellness examination  Historical  No qualifying data  Procedure/Surgical History  Replacement of Left Shoulder Joint with Reverse Ball and Socket Synthetic Substitute, Open Approach (2020)  Total Reverse Shoulder (Left) (2020)  Replacement of Right Shoulder Joint with Reverse Ball and Socket Synthetic Substitute, Open Approach (2020)  Total Reverse Shoulder (Right) (2020)  Repair Scalp Subcutaneous Tissue and Fascia, Percutaneous Approach (2020)  Fusion of 2 or more Cervical Vertebral Joints with Autologous Tissue Substitute, Posterior Approach, Posterior Column, Open Approach (2018)  Fusion of 2 to 7 Thoracic Vertebral Joints with Autologous Tissue Substitute, Posterior Approach, Posterior Column, Open Approach (2018)  Fusion of Cervicothoracic Vertebral Joint with Autologous Tissue Substitute, Posterior Approach, Posterior Column, Open Approach (2018)  Posterior Cervical Fusion with O-arm (.) (2018)  Removal of Internal Fixation Device from Cervical Vertebra, Open Approach (2018)  Removal of Internal Fixation Device from Thoracic Vertebra, Open Approach (2018)  Procedure on spine (2018)  Replacement of Left Knee Joint with Synthetic Substitute, Cemented, Open Approach (2016)  Total Knee Arthroplasty (Left) (2016)  Bone density scan (2016)  Destruction of Left Lens, Percutaneous Approach (10/29/2015)  Discission of secondary membranous cataract (opacified posterior lens capsule and/or anterior hyaloid); laser surgery (eg, YAG laser) (1 or more stages) (10/29/2015)  Total knee replacement (2013)  Colonoscopy (2010)  Appendectomy;  arthoscopy - left knee    Cholecystectomy  cyst removed right hand  lumbar fusion  removal of catract bilatterally  right knee replacement  Tonsillectomy   Medications  acetaminophen-hydrocodone 325 mg-5 mg oral tablet, 1 tab(s), Oral, q6hr, PRN  Acidophilus Probiotic Blend, 
10 billion cell, Oral, Daily  amLODIPine 10 mg oral tablet, 10 mg= 1 tab(s), Oral, Daily, 1 refills  ascorbic acid 500 mg oral tablet, 500 mg= 1 tab(s), Oral, At Bedtime  aspirin 81 mg oral tablet, 81 mg= 1 tab(s), Oral, Daily  Co-Q10, 1 tab, Oral, Daily  ibandronate 150 mg oral tablet, See Instructions, 3 refills  methocarbamol 500 mg oral tablet, 500 mg= 1 tab(s), Oral, TID  Neurontin 300 mg oral capsule, 300 mg= 1 cap(s), Oral, At Bedtime  Oystercal-D, 1 tab(s), Oral, BID  polyethylene glycol 3350 oral powder for reconstitution, Oral, Daily  quinapril 20 mg oral tablet, 20 mg= 1 tab(s), Oral, BID, 1 refills  Senokot S 50 mg-8.6 mg oral tablet, 2 tab(s), Oral, BID  tiZANidine 4 mg oral tablet, 4 mg= 1 tab(s), Oral, q8hr  Ultram 50 mg oral tablet, 50 mg= 1 tab(s), Oral, q4hr, PRN  Louann 250 mg oral tablet, 1000 mg= 4 tab(s), Oral, BID  Welchol 625 mg oral tablet, 1875 mg= 3 tab(s), Oral, BID, 3 refills  Allergies  acetaminophen?(has primary cirrhosis)  Social History  Abuse/Neglect  No, 05/13/2020  Alcohol  Current, Liquor, Daily, 05/09/2016  Liquor, 07/30/2013  Substance Use - Denies Substance Abuse, 07/30/2013  Tobacco  Former smoker, quit more than 30 days ago, No, 05/13/2020  Family History  Diabetes mellitus type 2: Sister.  Hypertension: Negative: Mother and Father.  Primary malignant neoplasm of colon: Grandfather.  Immunizations  Vaccine Date Status   influenza virus vaccine, inactivated 11/12/2019 Given   influenza virus vaccine, inactivated 10/30/2018 Given   tetanus/diphtheria/pertussis, acel(Tdap) 10/30/2018 Given   influenza virus vaccine, inactivated 2017 Recorded   zoster vaccine live 12/29/2015 Recorded   pneumococcal 23-polyvalent vaccine 09/28/2015 Recorded   pneumococcal 13-valent conjugate vaccine 2014 Recorded   Health Maintenance  Health Maintenance  ???Pending?(in the next year)  ??? ??OverDue  ??? ? ? ?Pneumococcal Vaccine due??and every?  ??? ? ? ?Advance Directive due??01/01/20??and every 
1??year(s)  ??? ? ? ?Geriatric Depression Screening due??01/01/20??and every 1??year(s)  ??? ? ? ?Hypertension Management-Education due??05/09/20??and every 1??year(s)  ??? ??Due In Future?  ??? ? ? ?Cognitive Screening not due until??01/01/21??and every 1??year(s)  ??? ? ? ?Fall Risk Assessment not due until??01/01/21??and every 1??year(s)  ??? ? ? ?Functional Assessment not due until??01/01/21??and every 1??year(s)  ??? ? ? ?Obesity Screening not due until??01/01/21??and every 1??year(s)  ??? ? ? ?Medicare Annual Wellness Exam not due until??03/19/21??and every 1??year(s)  ??? ? ? ?Hypertension Management-BMP not due until??04/04/21??and every 1??year(s)  ??? ? ? ?Aspirin Therapy for CVD Prevention not due until??04/06/21??and every 1??year(s)  ??? ? ? ?Hypertension Management-Blood Pressure not due until??04/15/21??and every 1??year(s)  ??? ? ? ?ADL Screening not due until??04/15/21??and every 1??year(s)  ???Satisfied?(in the past 1 year)  ??? ??Satisfied?  ??? ? ? ?ADL Screening on??04/15/20.??Satisfied by Sophie Crawford L. L.  ??? ? ? ?Aspirin Therapy for CVD Prevention on??04/06/20.??Satisfied by Luly Wahl RN  ??? ? ? ?Blood Pressure Screening on??04/15/20.??Satisfied by Sophie Crawford L. L.  ??? ? ? ?Body Mass Index Check on??05/13/20.??Satisfied by Lali Hunter  ??? ? ? ?Diabetes Maintenance-Fasting Lipid Profile on??03/19/20.??Satisfied by Noemi Gonzáles  ??? ? ? ?Diabetes Screening on??04/04/20.??Satisfied by Valerie Rawls  ??? ? ? ?Fall Risk Assessment on??04/03/20.??Satisfied by Madison Mckeon RN  ??? ? ? ?Functional Assessment on??04/06/20.??Satisfied by Luly Wahl RN  ??? ? ? ?Hypertension Management-Blood Pressure on??04/15/20.??Satisfied by Sophie Crawford  ??? ? ? ?Influenza Vaccine on??11/12/19.??Satisfied by Amanda Lopez MA  ??? ? ? ?Lipid Screening on??03/19/20.??Satisfied by Noemi Gonzáles  ??? ? ? ?Medicare Annual Wellness Exam on??03/19/20.??Satisfied by 
Finn SIMMS, Jos BEAN  ??? ? ? ?Obesity Screening on??05/13/20.??Satisfied by Lali Hunter  ?  Diagnostic Results  Shoulder radiographs show appropriate implant position     [1]?Office Visit Note; Balwinder LAWSON MD, Chadwick BARRAGAN 04/15/2020 09:16 CDT  
14-Jul-2020 09:15

## 2024-06-04 ENCOUNTER — APPOINTMENT (OUTPATIENT)
Dept: NEUROLOGY | Facility: CLINIC | Age: 49
End: 2024-06-04

## 2024-06-10 ENCOUNTER — APPOINTMENT (OUTPATIENT)
Dept: INTERNAL MEDICINE | Facility: CLINIC | Age: 49
End: 2024-06-10
Payer: MEDICAID

## 2024-06-10 VITALS
WEIGHT: 293 LBS | RESPIRATION RATE: 16 BRPM | DIASTOLIC BLOOD PRESSURE: 71 MMHG | OXYGEN SATURATION: 98 % | HEART RATE: 77 BPM | SYSTOLIC BLOOD PRESSURE: 120 MMHG | BODY MASS INDEX: 44.41 KG/M2 | TEMPERATURE: 97.6 F | HEIGHT: 68 IN

## 2024-06-10 DIAGNOSIS — H53.9 UNSPECIFIED VISUAL DISTURBANCE: ICD-10-CM

## 2024-06-10 DIAGNOSIS — R68.89 OTHER GENERAL SYMPTOMS AND SIGNS: ICD-10-CM

## 2024-06-10 PROCEDURE — 99214 OFFICE O/P EST MOD 30 MIN: CPT | Mod: 25

## 2024-06-10 PROCEDURE — G2211 COMPLEX E/M VISIT ADD ON: CPT | Mod: NC,1L

## 2024-06-10 NOTE — HEALTH RISK ASSESSMENT
[No] : No [No falls in past year] : Patient reported no falls in the past year [0] : 2) Feeling down, depressed, or hopeless: Not at all (0) [PHQ-2 Negative - No further assessment needed] : PHQ-2 Negative - No further assessment needed [[id number]] : 0 [Never] : Never

## 2024-06-10 NOTE — ASSESSMENT
[FreeTextEntry1] : #Vision changes/Changes in vision discussed w/ pt if vision is getting worse advised to go to ER however, since no acute change over past month - f/u opthamology if headache, loss of vision, or ANY EYE PAIN or REDNESS - advised again go to ER pt and daughter agrees and understands plan via teach back method. all questions answered.

## 2024-06-10 NOTE — PHYSICAL EXAM
[Normal] : no acute distress, well nourished, well developed and well-appearing [Normal Sclera/Conjunctiva] : normal sclera/conjunctiva [PERRL] : pupils equal round and reactive to light [EOMI] : extraocular movements intact [No Lymphadenopathy] : no lymphadenopathy [No Respiratory Distress] : no respiratory distress  [No Accessory Muscle Use] : no accessory muscle use [Normal Rate] : normal rate  [Regular Rhythm] : with a regular rhythm [Normal S1, S2] : normal S1 and S2 [No Spinal Tenderness] : no spinal tenderness [Grossly Normal Strength/Tone] : grossly normal strength/tone [No Focal Deficits] : no focal deficits [Normal Affect] : the affect was normal [Alert and Oriented x3] : oriented to person, place, and time [Normal Insight/Judgement] : insight and judgment were intact [de-identified] : use cane for ambulation

## 2024-06-10 NOTE — HISTORY OF PRESENT ILLNESS
[FreeTextEntry8] : 48 F here accompanied by daughter Jessica The patient primary language is Kiswahili,  was offered to the patient, but she preferred her family member to interpret for her. pt c/o of changes in left eye for x 1 month pt reports has changes in vision and light sensiitvity no acute changes, but over past 1 month reports has increase " sparkling light" denies pain or curtain coming down or headache. pt denies eye pain,  eye discharge, trauma to eye, excessive tearing. pt denies any erythema, eye discharge or itching In addition, pt denies any foreign objects going into eyes. Pt has not seen ophthalmology.

## 2024-06-10 NOTE — PHYSICAL EXAM
[Normal] : no acute distress, well nourished, well developed and well-appearing [Normal Sclera/Conjunctiva] : normal sclera/conjunctiva [PERRL] : pupils equal round and reactive to light [EOMI] : extraocular movements intact [No Lymphadenopathy] : no lymphadenopathy [No Respiratory Distress] : no respiratory distress  [No Accessory Muscle Use] : no accessory muscle use [Normal Rate] : normal rate  [Regular Rhythm] : with a regular rhythm [Normal S1, S2] : normal S1 and S2 [No Spinal Tenderness] : no spinal tenderness [Grossly Normal Strength/Tone] : grossly normal strength/tone [No Focal Deficits] : no focal deficits [Normal Affect] : the affect was normal [Alert and Oriented x3] : oriented to person, place, and time [Normal Insight/Judgement] : insight and judgment were intact [de-identified] : use cane for ambulation

## 2024-06-10 NOTE — HEALTH RISK ASSESSMENT
[No] : No [No falls in past year] : Patient reported no falls in the past year [0] : 2) Feeling down, depressed, or hopeless: Not at all (0) [PHQ-2 Negative - No further assessment needed] : PHQ-2 Negative - No further assessment needed [[id number]] : 0 [Never] : Never

## 2024-06-28 ENCOUNTER — APPOINTMENT (OUTPATIENT)
Dept: ORTHOPEDIC SURGERY | Facility: CLINIC | Age: 49
End: 2024-06-28

## 2024-06-28 VITALS — WEIGHT: 293 LBS | HEIGHT: 68 IN | BODY MASS INDEX: 44.41 KG/M2

## 2024-06-28 DIAGNOSIS — S80.02XD CONTUSION OF LEFT KNEE, SUBSEQUENT ENCOUNTER: ICD-10-CM

## 2024-06-28 DIAGNOSIS — M17.12 UNILATERAL PRIMARY OSTEOARTHRITIS, LEFT KNEE: ICD-10-CM

## 2024-06-28 DIAGNOSIS — M17.11 UNILATERAL PRIMARY OSTEOARTHRITIS, RIGHT KNEE: ICD-10-CM

## 2024-06-28 DIAGNOSIS — S83.241A OTHER TEAR OF MEDIAL MENISCUS, CURRENT INJURY, RIGHT KNEE, INITIAL ENCOUNTER: ICD-10-CM

## 2024-06-28 DIAGNOSIS — S83.242D OTHER TEAR OF MEDIAL MENISCUS, CURRENT INJURY, LEFT KNEE, SUBSEQUENT ENCOUNTER: ICD-10-CM

## 2024-06-28 DIAGNOSIS — S83.282D OTHER TEAR OF LATERAL MENISCUS, CURRENT INJURY, LEFT KNEE, SUBSEQUENT ENCOUNTER: ICD-10-CM

## 2024-06-28 DIAGNOSIS — S80.01XD CONTUSION OF RIGHT KNEE, SUBSEQUENT ENCOUNTER: ICD-10-CM

## 2024-06-28 PROCEDURE — 20611 DRAIN/INJ JOINT/BURSA W/US: CPT | Mod: 50

## 2024-06-28 PROCEDURE — 99214 OFFICE O/P EST MOD 30 MIN: CPT | Mod: 25

## 2024-06-28 PROCEDURE — J3490M: CUSTOM

## 2024-07-19 ENCOUNTER — APPOINTMENT (OUTPATIENT)
Dept: NEUROLOGY | Facility: CLINIC | Age: 49
End: 2024-07-19

## 2024-07-19 VITALS
BODY MASS INDEX: 42.89 KG/M2 | SYSTOLIC BLOOD PRESSURE: 125 MMHG | HEART RATE: 99 BPM | WEIGHT: 283 LBS | DIASTOLIC BLOOD PRESSURE: 79 MMHG | HEIGHT: 68 IN

## 2024-07-19 PROCEDURE — 99215 OFFICE O/P EST HI 40 MIN: CPT

## 2024-08-11 ENCOUNTER — NON-APPOINTMENT (OUTPATIENT)
Age: 49
End: 2024-08-11

## 2024-08-12 ENCOUNTER — APPOINTMENT (OUTPATIENT)
Dept: INTERNAL MEDICINE | Facility: CLINIC | Age: 49
End: 2024-08-12

## 2024-08-12 VITALS
OXYGEN SATURATION: 96 % | HEART RATE: 76 BPM | TEMPERATURE: 98.3 F | RESPIRATION RATE: 16 BRPM | BODY MASS INDEX: 44.47 KG/M2 | DIASTOLIC BLOOD PRESSURE: 80 MMHG | WEIGHT: 280 LBS | HEIGHT: 66.54 IN | SYSTOLIC BLOOD PRESSURE: 119 MMHG

## 2024-08-12 DIAGNOSIS — Z12.31 ENCOUNTER FOR SCREENING MAMMOGRAM FOR MALIGNANT NEOPLASM OF BREAST: ICD-10-CM

## 2024-08-12 DIAGNOSIS — E66.01 MORBID (SEVERE) OBESITY DUE TO EXCESS CALORIES: ICD-10-CM

## 2024-08-12 DIAGNOSIS — Z29.9 ENCOUNTER FOR PROPHYLACTIC MEASURES, UNSPECIFIED: ICD-10-CM

## 2024-08-12 DIAGNOSIS — E11.39 TYPE 2 DIABETES MELLITUS WITH OTHER DIABETIC OPHTHALMIC COMPLICATION: ICD-10-CM

## 2024-08-12 DIAGNOSIS — Z13.31 ENCOUNTER FOR SCREENING FOR DEPRESSION: ICD-10-CM

## 2024-08-12 DIAGNOSIS — Z00.00 ENCOUNTER FOR GENERAL ADULT MEDICAL EXAMINATION W/OUT ABNORMAL FINDINGS: ICD-10-CM

## 2024-08-12 DIAGNOSIS — Z13.6 ENCOUNTER FOR SCREENING FOR CARDIOVASCULAR DISORDERS: ICD-10-CM

## 2024-08-12 DIAGNOSIS — Z12.11 ENCOUNTER FOR SCREENING FOR MALIGNANT NEOPLASM OF COLON: ICD-10-CM

## 2024-08-12 LAB
25(OH)D3 SERPL-MCNC: 22.6 NG/ML
ALBUMIN SERPL ELPH-MCNC: 4.1 G/DL
ALP BLD-CCNC: 105 U/L
ALT SERPL-CCNC: 55 U/L
ANION GAP SERPL CALC-SCNC: 16 MMOL/L
APPEARANCE: CLEAR
AST SERPL-CCNC: 49 U/L
BACTERIA: NEGATIVE /HPF
BILIRUB SERPL-MCNC: 0.4 MG/DL
BILIRUBIN URINE: NEGATIVE
BLOOD URINE: NEGATIVE
BUN SERPL-MCNC: 7 MG/DL
CALCIUM SERPL-MCNC: 9.4 MG/DL
CAST: 0 /LPF
CHLORIDE SERPL-SCNC: 94 MMOL/L
CHOLEST SERPL-MCNC: 185 MG/DL
CO2 SERPL-SCNC: 22 MMOL/L
COLOR: YELLOW
CREAT SERPL-MCNC: 0.64 MG/DL
CREAT SPEC-SCNC: 80 MG/DL
EGFR: 107 ML/MIN/1.73M2
EPITHELIAL CELLS: 3 /HPF
ESTIMATED AVERAGE GLUCOSE: 295 MG/DL
FOLATE SERPL-MCNC: 12.3 NG/ML
GLUCOSE QUALITATIVE U: >=1000 MG/DL
GLUCOSE SERPL-MCNC: 393 MG/DL
HBA1C MFR BLD HPLC: 11.9 %
HCT VFR BLD CALC: 44.2 %
HDLC SERPL-MCNC: 35 MG/DL
HGB BLD-MCNC: 14.3 G/DL
IRON SATN MFR SERPL: 21 %
IRON SERPL-MCNC: 77 UG/DL
KETONES URINE: 40 MG/DL
LDLC SERPL CALC-MCNC: 122 MG/DL
LEUKOCYTE ESTERASE URINE: NEGATIVE
MCHC RBC-ENTMCNC: 30 PG
MCHC RBC-ENTMCNC: 32.4 GM/DL
MCV RBC AUTO: 92.9 FL
MICROALBUMIN 24H UR DL<=1MG/L-MCNC: 2.2 MG/DL
MICROALBUMIN/CREAT 24H UR-RTO: 28 MG/G
MICROSCOPIC-UA: NORMAL
NITRITE URINE: NEGATIVE
NONHDLC SERPL-MCNC: 150 MG/DL
PH URINE: 5.5
PLATELET # BLD AUTO: 168 K/UL
POTASSIUM SERPL-SCNC: 4.6 MMOL/L
PROT SERPL-MCNC: 7.2 G/DL
PROTEIN URINE: NEGATIVE MG/DL
RBC # BLD: 4.76 M/UL
RBC # FLD: 13.4 %
RED BLOOD CELLS URINE: 1 /HPF
SODIUM SERPL-SCNC: 133 MMOL/L
SPECIFIC GRAVITY URINE: >1.03
TIBC SERPL-MCNC: 364 UG/DL
TRIGL SERPL-MCNC: 157 MG/DL
TSH SERPL-ACNC: 4.12 UIU/ML
UIBC SERPL-MCNC: 288 UG/DL
UROBILINOGEN URINE: 0.2 MG/DL
VIT B12 SERPL-MCNC: 1333 PG/ML
WBC # FLD AUTO: 6.34 K/UL
WHITE BLOOD CELLS URINE: 2 /HPF

## 2024-08-12 PROCEDURE — 99396 PREV VISIT EST AGE 40-64: CPT | Mod: 25

## 2024-08-12 PROCEDURE — G0444 DEPRESSION SCREEN ANNUAL: CPT | Mod: 59

## 2024-08-12 PROCEDURE — 36415 COLL VENOUS BLD VENIPUNCTURE: CPT

## 2024-08-12 PROCEDURE — G0447 BEHAVIOR COUNSEL OBESITY 15M: CPT

## 2024-08-12 NOTE — PHYSICAL EXAM
[Normal] : no acute distress, well nourished, well developed and well-appearing [Normal Sclera/Conjunctiva] : normal sclera/conjunctiva [PERRL] : pupils equal round and reactive to light [EOMI] : extraocular movements intact [Normal Oropharynx] : the oropharynx was normal [No Lymphadenopathy] : no lymphadenopathy [No Respiratory Distress] : no respiratory distress  [No Accessory Muscle Use] : no accessory muscle use [Normal Rate] : normal rate  [Regular Rhythm] : with a regular rhythm [Normal S1, S2] : normal S1 and S2 [No Edema] : there was no peripheral edema [Soft] : abdomen soft [Non Tender] : non-tender [Non-distended] : non-distended [Normal Bowel Sounds] : normal bowel sounds [Normal Posterior Cervical Nodes] : no posterior cervical lymphadenopathy [Normal Anterior Cervical Nodes] : no anterior cervical lymphadenopathy [No Spinal Tenderness] : no spinal tenderness [Grossly Normal Strength/Tone] : grossly normal strength/tone [No Focal Deficits] : no focal deficits [Normal Affect] : the affect was normal [Alert and Oriented x3] : oriented to person, place, and time [Normal Insight/Judgement] : insight and judgment were intact [Declined Breast Exam] : declined breast exam  [Declined Rectal Exam] : declined rectal exam [de-identified] : denies any vision loss, has to see ophthalmologist  [de-identified] : deferred [de-identified] : use cane for ambulation

## 2024-08-12 NOTE — ASSESSMENT
[FreeTextEntry1] : #CPE f/u blood and urine ekg -  review and interpreted previous EKG by cardiology this year following cardio  immunizations - deferred - will get shingles at pharmacy I spent 5 minutes with the patient conducting a screen using approved screening tool (PHQ2) and discussing results of said screen with patient during this encounter. The patient was counseled to get regular exercise and sleep, wear sunblock and wear a safety belt in the car. Check CBC, CMP Hgba1c , TSH and UA Pap Smear Mammo Colonoscopy  Ophtho Derm Dental #[id number] repeat a1c, albumin not on meds, repeat and check a1c See ophtho Fasting blood glucose < 130 Postprandial blood glucose < 160 discussed if elevated  start on metformin and GLP1 - but need to see opthamology prior to GLP1 #Obesity BMI discussed Diet Modification and Increase Physical Activity Setting realistic weight loss goals, such as a one- to two-pound weight loss per week. Eating fewer calories by cutting down on portion sizes. Aiming for at least five small handfuls of fruits and vegetables per day. 15 min spent on obesity discussion pt agrees and understands plan via teach back method. all questions answered.

## 2024-08-12 NOTE — HISTORY OF PRESENT ILLNESS
[de-identified] : 49 year F here for Complete Physical Exam.  accompanied by daughter Jessica The patient primary language is English,   was offered to the patient, but she preferred her family member   to interpret for her Pt is daily exercising?  NO Vaccinations: covid declined flu - deferred tdap - reports got in 10 years shingles - will get pharmacy  -Past Medical History: [id number] Morbid Obesity -Allergies: NKDA -Medications: Tylenol for pain L knee pain -Surgical Hx: L knee surgery 2024 w/ Dr. Alvarez Appendectomy Ovarian Surgery s/p possible ectopic pregnancy -Family Hx: Mother: , liver disease Father:  [id number] Children: 1 son 2 daughters Seperated  Screening: colonoscopy: due for colonoscopy no family hx of colon cancer  obgyn: need new obgyn LMP:  - postmenoppasual  last pap:  last mammo: due sept  Pregnancy:   -Social ETOH - denies Smoker - denies Illicit Drug use - denies  -Occupation: Unemployed  Pt has no acute complaints

## 2024-08-12 NOTE — HEALTH RISK ASSESSMENT
[No] : No [No falls in past year] : Patient reported no falls in the past year [0] : 2) Feeling down, depressed, or hopeless: Not at all (0) [PHQ-2 Negative - No further assessment needed] : PHQ-2 Negative - No further assessment needed [I have developed a follow-up plan documented below in the note.] : I have developed a follow-up plan documented below in the note. [Time Spent: ___ Minutes] : I spent [unfilled] minutes performing a depression screening for this patient. [Never] : Never [NO] : No [HIV Test offered] : HIV Test offered [Hepatitis C test offered] : Hepatitis C test offered [None] : None [With Family] : lives with family [Feels Safe at Home] : Feels safe at home [Fully functional (bathing, dressing, toileting, transferring, walking, feeding)] : Fully functional (bathing, dressing, toileting, transferring, walking, feeding) [Fully functional (using the telephone, shopping, preparing meals, housekeeping, doing laundry, using] : Fully functional and needs no help or supervision to perform IADLs (using the telephone, shopping, preparing meals, housekeeping, doing laundry, using transportation, managing medications and managing finances) [Reports normal functional visual acuity (ie: able to read med bottle)] : Reports normal functional visual acuity [Smoke Detector] : smoke detector [Carbon Monoxide Detector] : carbon monoxide detector [Safety elements used in home] : safety elements used in home [Seat Belt] :  uses seat belt [Sunscreen] : uses sunscreen [VRL9Pmbit] : 0 [Change in mental status noted] : No change in mental status noted [Behavior] : denies difficulty with behavior [Learning/Retaining New Information] : denies difficulty learning/retaining new information [Handling Complex Tasks] : denies difficulty handling complex tasks [Reasoning] : denies difficulty with reasoning [Spatial Ability and Orientation] : denies difficulty with spatial ability and orientation [High Risk Behavior] : no high risk behavior [Reports changes in hearing] : Reports no changes in hearing [Reports changes in vision] : Reports no changes in vision [Reports changes in dental health] : Reports no changes in dental health [Travel to Developing Areas] : does not  travel to developing areas [TB Exposure] : is not being exposed to tuberculosis [Caregiver Concerns] : does not have caregiver concerns

## 2024-08-13 LAB
HBV SURFACE AB SERPL IA-ACNC: <3 MIU/ML
HCV AB SER QL: NONREACTIVE
HCV S/CO RATIO: 0.08 S/CO

## 2024-08-19 ENCOUNTER — NON-APPOINTMENT (OUTPATIENT)
Age: 49
End: 2024-08-19

## 2024-08-28 RX ORDER — ROSUVASTATIN CALCIUM 5 MG/1
5 TABLET, FILM COATED ORAL
Qty: 90 | Refills: 3 | Status: ACTIVE | COMMUNITY
Start: 2024-08-28 | End: 1900-01-01

## 2024-08-28 RX ORDER — INSULIN ASPART 100 [IU]/ML
100 INJECTION, SOLUTION INTRAVENOUS; SUBCUTANEOUS
Qty: 1 | Refills: 3 | Status: ACTIVE | COMMUNITY
Start: 2024-08-28 | End: 1900-01-01

## 2024-08-28 RX ORDER — PEN NEEDLE, DIABETIC 31 GX5/16"
NEEDLE, DISPOSABLE MISCELLANEOUS
Qty: 1 | Refills: 1 | Status: ACTIVE | COMMUNITY
Start: 2024-08-28 | End: 1900-01-01

## 2024-08-28 RX ORDER — BLOOD-GLUCOSE METER
W/DEVICE KIT MISCELLANEOUS
Qty: 1 | Refills: 0 | Status: ACTIVE | COMMUNITY
Start: 2024-08-28 | End: 1900-01-01

## 2024-08-28 RX ORDER — 70%ISOPROPYL ALCOHOL 0.7 ML/ML
70 SWAB TOPICAL
Qty: 1 | Refills: 3 | Status: ACTIVE | COMMUNITY
Start: 2024-08-28 | End: 1900-01-01

## 2024-08-28 RX ORDER — BLOOD SUGAR DIAGNOSTIC
STRIP MISCELLANEOUS 4 TIMES DAILY
Qty: 4 | Refills: 1 | Status: ACTIVE | COMMUNITY
Start: 2024-08-28 | End: 1900-01-01

## 2024-08-28 RX ORDER — INSULIN GLARGINE 100 [IU]/ML
100 INJECTION, SOLUTION SUBCUTANEOUS
Qty: 1 | Refills: 3 | Status: ACTIVE | COMMUNITY
Start: 2024-08-28 | End: 1900-01-01

## 2024-08-28 RX ORDER — PEN NEEDLE, DIABETIC 29 G X1/2"
32G X 4 MM NEEDLE, DISPOSABLE MISCELLANEOUS
Qty: 360 | Refills: 0 | Status: ACTIVE | COMMUNITY
Start: 2024-08-28 | End: 1900-01-01

## 2024-08-28 RX ORDER — PEN NEEDLE, DIABETIC 29 G X1/2"
31G X 8 MM NEEDLE, DISPOSABLE MISCELLANEOUS
Qty: 1 | Refills: 3 | Status: ACTIVE | COMMUNITY
Start: 2024-08-28 | End: 1900-01-01

## 2024-09-03 ENCOUNTER — APPOINTMENT (OUTPATIENT)
Dept: INTERNAL MEDICINE | Facility: CLINIC | Age: 49
End: 2024-09-03
Payer: MEDICAID

## 2024-09-03 VITALS
HEART RATE: 83 BPM | HEIGHT: 66.54 IN | DIASTOLIC BLOOD PRESSURE: 76 MMHG | WEIGHT: 278 LBS | TEMPERATURE: 97.8 F | SYSTOLIC BLOOD PRESSURE: 120 MMHG | RESPIRATION RATE: 16 BRPM | OXYGEN SATURATION: 97 % | BODY MASS INDEX: 44.15 KG/M2

## 2024-09-03 DIAGNOSIS — E11.65 TYPE 2 DIABETES MELLITUS WITH HYPERGLYCEMIA: ICD-10-CM

## 2024-09-03 PROCEDURE — 36415 COLL VENOUS BLD VENIPUNCTURE: CPT

## 2024-09-03 PROCEDURE — 99214 OFFICE O/P EST MOD 30 MIN: CPT | Mod: 25

## 2024-09-03 PROCEDURE — G2211 COMPLEX E/M VISIT ADD ON: CPT | Mod: NC

## 2024-09-03 NOTE — HISTORY OF PRESENT ILLNESS
[de-identified] : 49 F accompanied by daughter bronson The patient primary language is Georgian,  was offered to the patient, but she preferred her family member to interpret for her Diabetes Type 2 8/24 a1c 11.9 from 6.7 was rx Lantus 30 units qhs,  Novolog 10 units with meals, Crestor 5mg qhs,  - currently did not  from pharmacy or not taking Not Checking Blood Sugar at home No complaints of foot pain or paresthesia's following ophthalmology and podiatry  following low carb diet

## 2024-09-03 NOTE — PHYSICAL EXAM
[Normal Sclera/Conjunctiva] : normal sclera/conjunctiva [PERRL] : pupils equal round and reactive to light [EOMI] : extraocular movements intact [Normal Oropharynx] : the oropharynx was normal [No Lymphadenopathy] : no lymphadenopathy [No Respiratory Distress] : no respiratory distress  [No Accessory Muscle Use] : no accessory muscle use [Normal Rate] : normal rate  [Regular Rhythm] : with a regular rhythm [Normal S1, S2] : normal S1 and S2 [No Edema] : there was no peripheral edema [Soft] : abdomen soft [Non Tender] : non-tender [Non-distended] : non-distended [Normal Posterior Cervical Nodes] : no posterior cervical lymphadenopathy [Normal Anterior Cervical Nodes] : no anterior cervical lymphadenopathy [No Spinal Tenderness] : no spinal tenderness [Grossly Normal Strength/Tone] : grossly normal strength/tone [No Focal Deficits] : no focal deficits [Normal Affect] : the affect was normal [Normal Insight/Judgement] : insight and judgment were intact [Normal] : no acute distress, well nourished, well developed and well-appearing [Declined Breast Exam] : declined breast exam  [Alert and Oriented x3] : oriented to person, place, and time [de-identified] : deferred [de-identified] : use cane for ambulation

## 2024-09-03 NOTE — HEALTH RISK ASSESSMENT
[No] : No [No falls in past year] : Patient reported no falls in the past year [0] : 2) Feeling down, depressed, or hopeless: Not at all (0) [PHQ-2 Negative - No further assessment needed] : PHQ-2 Negative - No further assessment needed [Never] : Never [[id number]] : 0

## 2024-09-03 NOTE — ASSESSMENT
[FreeTextEntry1] : #[id number] Diabetes Type 2 start on insulin - has to get from pharmacy long term and short term crestor 5mg qhs f/u endo f/u 4 weeks f/u cmp Patient counseled extensively about the complications of diabetes including but not limited to nephropathy, neuropathy, and retinopathy.  We discussed the importance of annual foot and optho exams.  pending opthamology results following low carb diet Continue monitoring FSG and or CGM. Encouraged to count carbohydrates, exercise daily and examine feet daily. If FSG/CGM are consistently greater than 300 or less than 70, patient should call MD. The patient was instructed to eat regularly without skipping meals.   Fasting blood glucose < 130 Postprandial blood glucose < 160 pt and daughter agrees and understands plan via teach back method. all questions answered.

## 2024-09-05 ENCOUNTER — NON-APPOINTMENT (OUTPATIENT)
Age: 49
End: 2024-09-05

## 2024-09-05 LAB
ALBUMIN SERPL ELPH-MCNC: 4.4 G/DL
ALP BLD-CCNC: 123 U/L
ALT SERPL-CCNC: 47 U/L
ANION GAP SERPL CALC-SCNC: 15 MMOL/L
AST SERPL-CCNC: 53 U/L
BILIRUB SERPL-MCNC: 0.3 MG/DL
BUN SERPL-MCNC: 8 MG/DL
CALCIUM SERPL-MCNC: 9.6 MG/DL
CHLORIDE SERPL-SCNC: 98 MMOL/L
CO2 SERPL-SCNC: 21 MMOL/L
CREAT SERPL-MCNC: 0.57 MG/DL
EGFR: 110 ML/MIN/1.73M2
GLUCOSE SERPL-MCNC: 394 MG/DL
POTASSIUM SERPL-SCNC: 4.7 MMOL/L
PROT SERPL-MCNC: 7.2 G/DL
SODIUM SERPL-SCNC: 134 MMOL/L

## 2024-09-13 ENCOUNTER — APPOINTMENT (OUTPATIENT)
Dept: ORTHOPEDIC SURGERY | Facility: CLINIC | Age: 49
End: 2024-09-13

## 2024-09-13 DIAGNOSIS — Z86.39 PERSONAL HISTORY OF OTHER ENDOCRINE, NUTRITIONAL AND METABOLIC DISEASE: ICD-10-CM

## 2024-09-13 PROCEDURE — 99214 OFFICE O/P EST MOD 30 MIN: CPT

## 2024-09-13 NOTE — PHYSICAL EXAM
[de-identified] : LEFT KNEE:  No erythema, no discharge, no increased warmth to touch. Healed incisions. ROM 0-130, strength testing 5/5. Distally neurovascularly intact with common peroneal, saphenous, sural, tibial, superficial peroneal nerves intact. 2 DP pulse with capillary refill >2 seconds.  RIGHT Knee:                    ROM:  0-130 degrees   Lachman: Negative Pivot Shift: Negative Anterior Drawer: Negative Posterior Drawer / Sag: Negative Varus Stress 0 degrees: Stable Varus Stress 30 degrees: Stable Valgus Stress 0 degrees: Stable Valgus Stress 30 degrees: Stable Medial Fransico: Positive Lateral Fransico: Negative Patella Glide: 2+ Patella Apprehension: Negative Patella Grind: Negative   Palpation: Medial Joint Line: TTP Lateral Joint Line: Nontender Medial Collateral Ligament: Nontender Lateral Collateral Ligament/PLC: Nontender Distal Femur: Nontender Proximal Tibia: Nontender Tibial Tubercle: Nontender Distal Pole Patella: Nontender Quadriceps Tendon: Nontender &  Intact Patella Tendon: Nontender &  Intact Medial Distal Hamstring/PES: Nontender Lateral Distal Hamstring: Nontender & Stable Iliotibial Band: Nontender Medial Patellofemoral Ligament: Nontender Adductor: Nontender Proximal GSC-Plantaris: Nontender Calf: Supple & Nontender   Inspection: Deformity: No Erythema: No Ecchymosis: No Abrasions: No Effusion: Moderate Prepatellar Bursitis: No   Neurologic Exam: Sensation L4-S1: Grossly Intact   Motor Exam: Quadriceps: 5 out of 5 Hamstrings: 5 out of 5 EHL: 5 out of 5 FHL: 5 out of 5 TA: 5 out of 5 GS: 5 out of 5   Circulatory/Pulses: Dorsalis Pedis: 2+ Posterior Tibialis: 2+

## 2024-09-13 NOTE — HISTORY OF PRESENT ILLNESS
Yes [Not working due to injury] : Work status: not working due to injury [de-identified] : 8/11/23: Patient is a 49yo F presenting for evaluation of b/l knee pain, L>R. Saw Dr. De La O, has been doing PT and complains pain has worsened since beginning Pt. Not working. She has been using cane to ambulate since 4/29 after she fell. Reports clicking and popping.  PMH: pre-diabetic   11/10/23: She returns with her spouse with continued pain L>R. She reports she developed itching, redness and swelling at injection site on the L. Overall her pain has increased.  She is using a cane. Patient was previously attending PT and stopped because she was experiencing severe pain in her lower back and knees. Patient states pain in left knee is worse when lying down  12/22/23: F/U left knee; Patient states PT is not helping. PT 2x a week. After PT, pain increases. Pain has stayed the same since last visit. Accompanied by her spouse.    2/23/24 Pt is here for her first Post op, S/P LT KNEE SCOPE PMM PLM on 2/13/24. pt feeling okay since surgery. Does report pain persistent pain in right knee  3/22/24: Patient here for POV2 s/p left knee scope, pmm, and review right knee MRI results  6/28/24: Patient states she has been in pain since Saturday. Patient could not do therapy on Monday due to pain. Patient had to take oxy on Monday, Tuesday, Wednesday for the pain with no pain relief. Uses cane to ambulate. Notes clicking of both knees. Patient states tingling going down both legs posteriorly.   09/13/2024: pt is here today for f/u on right knee, pt states she still has pain within the area, could have more but depending on movements/ activities. Last csi was 6/28/24 with some relief [de-identified] : Follow up on the right knee today. Still having pain in the knee. Still using cane for ambulation. Not working  Yes

## 2024-09-13 NOTE — DISCUSSION/SUMMARY
[de-identified] : 50yo female with b/l knee OA  1) discussed availability of CSI vs gels injections - last csi 6/28/24. Advised to return in October 2) Follow up in 1 month

## 2024-10-09 ENCOUNTER — APPOINTMENT (OUTPATIENT)
Dept: INTERNAL MEDICINE | Facility: CLINIC | Age: 49
End: 2024-10-09
Payer: MEDICAID

## 2024-10-09 VITALS
OXYGEN SATURATION: 95 % | DIASTOLIC BLOOD PRESSURE: 78 MMHG | HEART RATE: 79 BPM | HEIGHT: 66.54 IN | TEMPERATURE: 98.5 F | SYSTOLIC BLOOD PRESSURE: 118 MMHG | RESPIRATION RATE: 16 BRPM | BODY MASS INDEX: 43.36 KG/M2 | WEIGHT: 273 LBS

## 2024-10-09 DIAGNOSIS — M51.26 OTHER INTERVERTEBRAL DISC DISPLACEMENT, LUMBAR REGION: ICD-10-CM

## 2024-10-09 DIAGNOSIS — M51.369: ICD-10-CM

## 2024-10-09 LAB
ALBUMIN SERPL ELPH-MCNC: 4 G/DL
ALP BLD-CCNC: 88 U/L
ALT SERPL-CCNC: 47 U/L
ANION GAP SERPL CALC-SCNC: 14 MMOL/L
AST SERPL-CCNC: 48 U/L
BILIRUB SERPL-MCNC: 0.3 MG/DL
BUN SERPL-MCNC: 15 MG/DL
CALCIUM SERPL-MCNC: 9.4 MG/DL
CHLORIDE SERPL-SCNC: 103 MMOL/L
CO2 SERPL-SCNC: 23 MMOL/L
CREAT SERPL-MCNC: 0.58 MG/DL
EGFR: 110 ML/MIN/1.73M2
GLUCOSE SERPL-MCNC: 259 MG/DL
POTASSIUM SERPL-SCNC: 4.5 MMOL/L
PROT SERPL-MCNC: 6.9 G/DL
SODIUM SERPL-SCNC: 140 MMOL/L

## 2024-10-09 PROCEDURE — 99214 OFFICE O/P EST MOD 30 MIN: CPT

## 2024-10-09 PROCEDURE — 36415 COLL VENOUS BLD VENIPUNCTURE: CPT

## 2024-10-09 RX ORDER — METFORMIN HYDROCHLORIDE 500 MG/1
500 TABLET, COATED ORAL
Qty: 360 | Refills: 2 | Status: ACTIVE | COMMUNITY
Start: 2024-10-09 | End: 1900-01-01

## 2024-10-14 ENCOUNTER — APPOINTMENT (OUTPATIENT)
Dept: ENDOCRINOLOGY | Facility: CLINIC | Age: 49
End: 2024-10-14
Payer: MEDICAID

## 2024-10-14 VITALS
HEART RATE: 90 BPM | DIASTOLIC BLOOD PRESSURE: 75 MMHG | HEIGHT: 66.54 IN | BODY MASS INDEX: 43.67 KG/M2 | OXYGEN SATURATION: 96 % | WEIGHT: 275 LBS | SYSTOLIC BLOOD PRESSURE: 120 MMHG

## 2024-10-14 DIAGNOSIS — E11.65 TYPE 2 DIABETES MELLITUS WITH HYPERGLYCEMIA: ICD-10-CM

## 2024-10-14 DIAGNOSIS — E78.00 PURE HYPERCHOLESTEROLEMIA, UNSPECIFIED: ICD-10-CM

## 2024-10-14 LAB — GLUCOSE BLDC GLUCOMTR-MCNC: 189

## 2024-10-14 PROCEDURE — 99204 OFFICE O/P NEW MOD 45 MIN: CPT

## 2024-10-14 PROCEDURE — 82962 GLUCOSE BLOOD TEST: CPT

## 2024-10-14 PROCEDURE — G2211 COMPLEX E/M VISIT ADD ON: CPT | Mod: NC

## 2024-10-17 ENCOUNTER — APPOINTMENT (OUTPATIENT)
Dept: PAIN MANAGEMENT | Facility: CLINIC | Age: 49
End: 2024-10-17

## 2024-10-17 VITALS
BODY MASS INDEX: 43.67 KG/M2 | WEIGHT: 275 LBS | HEIGHT: 66.54 IN | SYSTOLIC BLOOD PRESSURE: 125 MMHG | HEART RATE: 71 BPM | DIASTOLIC BLOOD PRESSURE: 71 MMHG

## 2024-10-17 PROCEDURE — 99204 OFFICE O/P NEW MOD 45 MIN: CPT

## 2024-10-25 ENCOUNTER — APPOINTMENT (OUTPATIENT)
Dept: ORTHOPEDIC SURGERY | Facility: CLINIC | Age: 49
End: 2024-10-25

## 2024-11-04 ENCOUNTER — APPOINTMENT (OUTPATIENT)
Dept: ENDOCRINOLOGY | Facility: CLINIC | Age: 49
End: 2024-11-04
Payer: MEDICAID

## 2024-11-04 VITALS
HEIGHT: 66.54 IN | SYSTOLIC BLOOD PRESSURE: 100 MMHG | OXYGEN SATURATION: 99 % | WEIGHT: 273 LBS | HEART RATE: 67 BPM | BODY MASS INDEX: 43.36 KG/M2 | DIASTOLIC BLOOD PRESSURE: 70 MMHG

## 2024-11-04 DIAGNOSIS — E11.65 TYPE 2 DIABETES MELLITUS WITH HYPERGLYCEMIA: ICD-10-CM

## 2024-11-04 DIAGNOSIS — E66.9 OBESITY, UNSPECIFIED: ICD-10-CM

## 2024-11-04 DIAGNOSIS — E78.00 PURE HYPERCHOLESTEROLEMIA, UNSPECIFIED: ICD-10-CM

## 2024-11-04 PROCEDURE — 99214 OFFICE O/P EST MOD 30 MIN: CPT

## 2024-11-04 PROCEDURE — G0108 DIAB MANAGE TRN  PER INDIV: CPT

## 2024-11-06 ENCOUNTER — APPOINTMENT (OUTPATIENT)
Dept: PULMONOLOGY | Facility: CLINIC | Age: 49
End: 2024-11-06
Payer: MEDICAID

## 2024-11-06 ENCOUNTER — APPOINTMENT (OUTPATIENT)
Dept: PAIN MANAGEMENT | Facility: CLINIC | Age: 49
End: 2024-11-06

## 2024-11-06 VITALS — OXYGEN SATURATION: 94 % | SYSTOLIC BLOOD PRESSURE: 111 MMHG | DIASTOLIC BLOOD PRESSURE: 75 MMHG | HEART RATE: 71 BPM

## 2024-11-06 DIAGNOSIS — G47.19 OTHER HYPERSOMNIA: ICD-10-CM

## 2024-11-06 DIAGNOSIS — R06.83 SNORING: ICD-10-CM

## 2024-11-06 DIAGNOSIS — R07.89 OTHER CHEST PAIN: ICD-10-CM

## 2024-11-06 PROCEDURE — 71046 X-RAY EXAM CHEST 2 VIEWS: CPT

## 2024-11-06 PROCEDURE — 99214 OFFICE O/P EST MOD 30 MIN: CPT | Mod: 25

## 2024-12-16 ENCOUNTER — APPOINTMENT (OUTPATIENT)
Dept: ENDOCRINOLOGY | Facility: CLINIC | Age: 49
End: 2024-12-16

## 2024-12-24 PROBLEM — F10.90 ALCOHOL USE: Status: RESOLVED | Noted: 2020-06-16 | Resolved: 2023-07-19
